# Patient Record
Sex: FEMALE | Race: BLACK OR AFRICAN AMERICAN | NOT HISPANIC OR LATINO | Employment: FULL TIME | RURAL
[De-identification: names, ages, dates, MRNs, and addresses within clinical notes are randomized per-mention and may not be internally consistent; named-entity substitution may affect disease eponyms.]

---

## 2017-12-06 ENCOUNTER — HISTORICAL (OUTPATIENT)
Dept: ADMINISTRATIVE | Facility: HOSPITAL | Age: 22
End: 2017-12-06

## 2017-12-07 LAB
LAB AP CLINICAL INFORMATION: NORMAL
LAB AP DIAGNOSIS - HISTORICAL: NORMAL
LAB AP GROSS PATHOLOGY - HISTORICAL: NORMAL
LAB AP SPECIMEN SUBMITTED - HISTORICAL: NORMAL

## 2018-09-24 ENCOUNTER — HISTORICAL (OUTPATIENT)
Dept: ADMINISTRATIVE | Facility: HOSPITAL | Age: 23
End: 2018-09-24

## 2019-11-13 ENCOUNTER — HISTORICAL (OUTPATIENT)
Dept: ADMINISTRATIVE | Facility: HOSPITAL | Age: 24
End: 2019-11-13

## 2019-11-15 LAB
LAB AP CLINICAL INFORMATION: NORMAL
LAB AP GENERAL CAT - HISTORICAL: NORMAL
LAB AP INTERPRETATION/RESULT - HISTORICAL: NEGATIVE
LAB AP SPECIMEN ADEQUACY - HISTORICAL: NORMAL
LAB AP SPECIMEN SUBMITTED - HISTORICAL: NORMAL

## 2020-04-01 ENCOUNTER — HISTORICAL (OUTPATIENT)
Dept: ADMINISTRATIVE | Facility: HOSPITAL | Age: 25
End: 2020-04-01

## 2020-04-01 LAB
BASOPHILS # BLD AUTO: 0.04 X10E3/UL (ref 0–0.2)
BASOPHILS NFR BLD AUTO: 0.6 % (ref 0–1)
CANDIDA SPECIES: NEGATIVE
EOSINOPHIL # BLD AUTO: 0.07 X10E3/UL (ref 0–0.5)
EOSINOPHIL NFR BLD AUTO: 1 % (ref 1–4)
ERYTHROCYTE [DISTWIDTH] IN BLOOD BY AUTOMATED COUNT: 12 % (ref 11.5–14.5)
GARDNERELLA: NEGATIVE
HCT VFR BLD AUTO: 38.2 % (ref 38–47)
HGB BLD-MCNC: 12.4 G/DL (ref 12–16)
IMM GRANULOCYTES # BLD AUTO: 0 X10E3/UL (ref 0–0.04)
IMM GRANULOCYTES NFR BLD: 0 % (ref 0–0.4)
LYMPHOCYTES # BLD AUTO: 1.8 X10E3/UL (ref 1–4.8)
LYMPHOCYTES NFR BLD AUTO: 26.6 % (ref 27–41)
MCH RBC QN AUTO: 27.8 PG (ref 27–31)
MCHC RBC AUTO-ENTMCNC: 32.5 G/DL (ref 32–36)
MCV RBC AUTO: 85.7 FL (ref 80–96)
MONOCYTES # BLD AUTO: 0.65 X10E3/UL (ref 0–0.8)
MONOCYTES NFR BLD AUTO: 9.6 % (ref 2–6)
MPC BLD CALC-MCNC: 10.4 FL (ref 9.4–12.4)
NEUTROPHILS # BLD AUTO: 4.2 X10E3/UL (ref 1.8–7.7)
NEUTROPHILS NFR BLD AUTO: 62.2 % (ref 53–65)
NRBC # BLD AUTO: 0 X10E3/UL (ref 0–0)
NRBC, AUTO (.00): 0 /100 (ref 0–0)
PLATELET # BLD AUTO: 405 X10E3/UL (ref 150–400)
RBC # BLD AUTO: 4.46 X10E6/UL (ref 4.2–5.4)
TRICHOMONAS: NEGATIVE
WBC # BLD AUTO: 6.76 X10E3/UL (ref 4.5–11)

## 2020-04-02 LAB
BACTERIA #/AREA URNS HPF: ABNORMAL /HPF
BILIRUB UR QL STRIP: NEGATIVE MG/DL
CLARITY UR: CLEAR
COLOR UR: YELLOW
GLUCOSE UR STRIP-MCNC: NEGATIVE MG/DL
KETONES UR STRIP-SCNC: NEGATIVE MG/DL
LEUKOCYTE ESTERASE UR QL STRIP: NEGATIVE LEU/UL
NITRITE UR QL STRIP: NEGATIVE
PH UR STRIP: 7.5 PH UNITS (ref 5–8)
PROT UR QL STRIP: NEGATIVE MG/DL
RBC # UR STRIP: NEGATIVE ERY/UL
RBC #/AREA URNS HPF: ABNORMAL /HPF (ref 0–3)
SP GR UR STRIP: 1.01 (ref 1–1.03)
SQUAMOUS #/AREA URNS LPF: ABNORMAL /LPF
UROBILINOGEN UR STRIP-ACNC: 0.2 EU/DL
WBC #/AREA URNS HPF: ABNORMAL /HPF (ref 0–5)

## 2020-04-03 LAB
REPORT: NO GROWTH
REPORT: NORMAL

## 2020-04-29 ENCOUNTER — HISTORICAL (OUTPATIENT)
Dept: ADMINISTRATIVE | Facility: HOSPITAL | Age: 25
End: 2020-04-29

## 2020-05-21 ENCOUNTER — HISTORICAL (OUTPATIENT)
Dept: ADMINISTRATIVE | Facility: HOSPITAL | Age: 25
End: 2020-05-21

## 2020-05-23 LAB
REPORT: NO GROWTH
REPORT: NORMAL

## 2020-06-02 ENCOUNTER — HISTORICAL (OUTPATIENT)
Dept: ADMINISTRATIVE | Facility: HOSPITAL | Age: 25
End: 2020-06-02

## 2020-06-15 ENCOUNTER — HISTORICAL (OUTPATIENT)
Dept: ADMINISTRATIVE | Facility: HOSPITAL | Age: 25
End: 2020-06-15

## 2020-07-22 ENCOUNTER — HISTORICAL (OUTPATIENT)
Dept: ADMINISTRATIVE | Facility: HOSPITAL | Age: 25
End: 2020-07-22

## 2020-07-22 LAB
BACTERIA #/AREA URNS HPF: ABNORMAL /HPF
BASOPHILS # BLD AUTO: 0.04 X10E3/UL (ref 0–0.2)
BASOPHILS NFR BLD AUTO: 0.4 % (ref 0–1)
BILIRUB UR QL STRIP: NEGATIVE MG/DL
CLARITY UR: CLEAR
COLOR UR: YELLOW
EOSINOPHIL # BLD AUTO: 0.07 X10E3/UL (ref 0–0.5)
EOSINOPHIL NFR BLD AUTO: 0.8 % (ref 1–4)
ERYTHROCYTE [DISTWIDTH] IN BLOOD BY AUTOMATED COUNT: 12.1 % (ref 11.5–14.5)
GLUCOSE UR STRIP-MCNC: NEGATIVE MG/DL
HCT VFR BLD AUTO: 38.9 % (ref 38–47)
HGB BLD-MCNC: 12.6 G/DL (ref 12–16)
IMM GRANULOCYTES # BLD AUTO: 0.02 X10E3/UL (ref 0–0.04)
IMM GRANULOCYTES NFR BLD: 0.2 % (ref 0–0.4)
KETONES UR STRIP-SCNC: NEGATIVE MG/DL
LEUKOCYTE ESTERASE UR QL STRIP: NEGATIVE LEU/UL
LYMPHOCYTES # BLD AUTO: 2.7 X10E3/UL (ref 1–4.8)
LYMPHOCYTES NFR BLD AUTO: 30.1 % (ref 27–41)
MCH RBC QN AUTO: 27.8 PG (ref 27–31)
MCHC RBC AUTO-ENTMCNC: 32.4 G/DL (ref 32–36)
MCV RBC AUTO: 85.7 FL (ref 80–96)
MONOCYTES # BLD AUTO: 0.57 X10E3/UL (ref 0–0.8)
MONOCYTES NFR BLD AUTO: 6.3 % (ref 2–6)
MPC BLD CALC-MCNC: 9.9 FL (ref 9.4–12.4)
NEUTROPHILS # BLD AUTO: 5.58 X10E3/UL (ref 1.8–7.7)
NEUTROPHILS NFR BLD AUTO: 62.2 % (ref 53–65)
NITRITE UR QL STRIP: NEGATIVE
NRBC # BLD AUTO: 0 X10E3/UL (ref 0–0)
NRBC, AUTO (.00): 0 /100 (ref 0–0)
PH UR STRIP: 7 PH UNITS (ref 5–8)
PLATELET # BLD AUTO: 460 X10E3/UL (ref 150–400)
PROT UR QL STRIP: NEGATIVE MG/DL
RBC # BLD AUTO: 4.54 X10E6/UL (ref 4.2–5.4)
RBC # UR STRIP: NEGATIVE ERY/UL
RBC #/AREA URNS HPF: ABNORMAL /HPF (ref 0–3)
SP GR UR STRIP: <=1.005 (ref 1–1.03)
SQUAMOUS #/AREA URNS LPF: ABNORMAL /LPF
UROBILINOGEN UR STRIP-ACNC: 0.2 EU/DL
WBC # BLD AUTO: 8.98 X10E3/UL (ref 4.5–11)
WBC #/AREA URNS HPF: ABNORMAL /HPF (ref 0–5)

## 2020-07-24 ENCOUNTER — HISTORICAL (OUTPATIENT)
Dept: ADMINISTRATIVE | Facility: HOSPITAL | Age: 25
End: 2020-07-24

## 2020-07-24 LAB
ALBUMIN SERPL BCP-MCNC: 3.5 G/DL (ref 3.5–5)
ALBUMIN/GLOB SERPL: 0.9 {RATIO}
ALP SERPL-CCNC: 81 U/L (ref 52–144)
ALT SERPL W P-5'-P-CCNC: 23 U/L (ref 13–56)
AMPHET UR QL SCN: NEGATIVE NG/ML
AMYLASE SERPL-CCNC: 51 U/L (ref 25–115)
ANION GAP SERPL CALCULATED.3IONS-SCNC: 8 MMOL/L
AST SERPL W P-5'-P-CCNC: 18 U/L (ref 15–37)
BARBITURATES UR QL SCN: POSITIVE NG/ML
BASOPHILS # BLD AUTO: 0.03 X10E3/UL (ref 0–0.2)
BASOPHILS NFR BLD AUTO: 0.5 % (ref 0–1)
BENZODIAZ METAB UR QL SCN: NEGATIVE NG/ML
BILIRUB SERPL-MCNC: 0.3 MG/DL (ref 0–1.2)
BILIRUB UR QL STRIP: NEGATIVE MG/DL
BUN SERPL-MCNC: 8 MG/DL (ref 7–18)
BUN/CREAT SERPL: 13.1
CALCIUM SERPL-MCNC: 8.5 MG/DL (ref 8.5–10.1)
CANNABINOIDS UR QL SCN: NEGATIVE NG/ML
CHLORIDE SERPL-SCNC: 106 MMOL/L (ref 98–107)
CLARITY UR: CLEAR
CO2 SERPL-SCNC: 27 MMOL/L (ref 21–32)
COCAINE UR QL SCN: NEGATIVE NG/ML
COLOR UR: ABNORMAL
CREAT SERPL-MCNC: 0.61 MG/DL (ref 0.55–1.02)
EOSINOPHIL # BLD AUTO: 0.07 X10E3/UL (ref 0–0.5)
EOSINOPHIL NFR BLD AUTO: 1.1 % (ref 1–4)
ERYTHROCYTE [DISTWIDTH] IN BLOOD BY AUTOMATED COUNT: 11.9 % (ref 11.5–14.5)
GLOBULIN SER-MCNC: 3.7 G/DL (ref 2–4)
GLUCOSE SERPL-MCNC: 97 MG/DL (ref 74–106)
GLUCOSE UR STRIP-MCNC: NEGATIVE MG/DL
HCT VFR BLD AUTO: 37.3 % (ref 38–47)
HGB BLD-MCNC: 12.2 G/DL (ref 12–16)
IMM GRANULOCYTES # BLD AUTO: 0.01 X10E3/UL (ref 0–0.04)
IMM GRANULOCYTES NFR BLD: 0.2 % (ref 0–0.4)
KETONES UR STRIP-SCNC: NEGATIVE MG/DL
LEUKOCYTE ESTERASE UR QL STRIP: NEGATIVE LEU/UL
LIPASE SERPL-CCNC: 59 U/L (ref 73–393)
LYMPHOCYTES # BLD AUTO: 1.85 X10E3/UL (ref 1–4.8)
LYMPHOCYTES NFR BLD AUTO: 29.5 % (ref 27–41)
MCH RBC QN AUTO: 28.7 PG (ref 27–31)
MCHC RBC AUTO-ENTMCNC: 32.7 G/DL (ref 32–36)
MCV RBC AUTO: 87.8 FL (ref 80–96)
MONOCYTES # BLD AUTO: 0.46 X10E3/UL (ref 0–0.8)
MONOCYTES NFR BLD AUTO: 7.3 % (ref 2–6)
MPC BLD CALC-MCNC: 9.3 FL (ref 9.4–12.4)
NEUTROPHILS # BLD AUTO: 3.86 X10E3/UL (ref 1.8–7.7)
NEUTROPHILS NFR BLD AUTO: 61.4 % (ref 53–65)
NITRITE UR QL STRIP: NEGATIVE
NRBC # BLD AUTO: 0 X10E3/UL (ref 0–0)
NRBC, AUTO (.00): 0 /100 (ref 0–0)
OPIATES UR QL SCN: NEGATIVE NG/ML
PCP UR QL SCN: NEGATIVE NG/ML
PH UR STRIP: 7.5 PH UNITS (ref 5–8)
PLATELET # BLD AUTO: 391 X10E3/UL (ref 150–400)
POTASSIUM SERPL-SCNC: 4.1 MMOL/L (ref 3.5–5.1)
PROT SERPL-MCNC: 7.2 G/DL (ref 6.4–8.2)
PROT UR QL STRIP: NEGATIVE MG/DL
RBC # BLD AUTO: 4.25 X10E6/UL (ref 4.2–5.4)
RBC # UR STRIP: NEGATIVE ERY/UL
SODIUM SERPL-SCNC: 137 MMOL/L (ref 136–145)
SP GR UR STRIP: 1.01 (ref 1–1.03)
UROBILINOGEN UR STRIP-ACNC: 0.2 EU/DL
WBC # BLD AUTO: 6.28 X10E3/UL (ref 4.5–11)

## 2020-08-10 ENCOUNTER — HISTORICAL (OUTPATIENT)
Dept: ADMINISTRATIVE | Facility: HOSPITAL | Age: 25
End: 2020-08-10

## 2020-08-19 ENCOUNTER — HISTORICAL (OUTPATIENT)
Dept: ADMINISTRATIVE | Facility: HOSPITAL | Age: 25
End: 2020-08-19

## 2020-08-19 LAB
BACTERIA #/AREA URNS HPF: ABNORMAL /HPF
BILIRUB UR QL STRIP: NEGATIVE MG/DL
CLARITY UR: CLEAR
COLOR UR: YELLOW
GLUCOSE UR STRIP-MCNC: NEGATIVE MG/DL
KETONES UR STRIP-SCNC: NEGATIVE MG/DL
LEUKOCYTE ESTERASE UR QL STRIP: NEGATIVE LEU/UL
NITRITE UR QL STRIP: NEGATIVE
PH UR STRIP: 8 PH UNITS (ref 5–8)
PROT UR QL STRIP: NEGATIVE MG/DL
RBC # UR STRIP: NEGATIVE ERY/UL
RBC #/AREA URNS HPF: ABNORMAL /HPF (ref 0–3)
SP GR UR STRIP: 1.01 (ref 1–1.03)
SQUAMOUS #/AREA URNS LPF: ABNORMAL /LPF
UROBILINOGEN UR STRIP-ACNC: 0.2 EU/DL
WBC #/AREA URNS HPF: ABNORMAL /HPF (ref 0–5)

## 2020-08-21 LAB
REPORT: NORMAL

## 2020-09-12 ENCOUNTER — HISTORICAL (OUTPATIENT)
Dept: ADMINISTRATIVE | Facility: HOSPITAL | Age: 25
End: 2020-09-12

## 2020-09-12 LAB
BILIRUB UR QL STRIP: NEGATIVE MG/DL
CLARITY UR: CLEAR
COLOR UR: ABNORMAL
GLUCOSE UR STRIP-MCNC: NEGATIVE MG/DL
KETONES UR STRIP-SCNC: NEGATIVE MG/DL
LEUKOCYTE ESTERASE UR QL STRIP: NEGATIVE LEU/UL
NITRITE UR QL STRIP: NEGATIVE
PH UR STRIP: 6 PH UNITS (ref 5–8)
PROT UR QL STRIP: NEGATIVE MG/DL
RBC # UR STRIP: ABNORMAL ERY/UL
SP GR UR STRIP: 1.01 (ref 1–1.03)
UROBILINOGEN UR STRIP-ACNC: 0.2 EU/DL

## 2020-09-30 ENCOUNTER — HISTORICAL (OUTPATIENT)
Dept: ADMINISTRATIVE | Facility: HOSPITAL | Age: 25
End: 2020-09-30

## 2020-09-30 LAB
ESTRADIOL SERPL-MCNC: 26.2 PG/ML
T4 FREE SERPL-MCNC: 1.04 NG/DL (ref 0.76–1.46)
TSH SERPL DL<=0.005 MIU/L-ACNC: 0.89 UIU/ML (ref 0.36–3.74)

## 2020-10-05 ENCOUNTER — HISTORICAL (OUTPATIENT)
Dept: ADMINISTRATIVE | Facility: HOSPITAL | Age: 25
End: 2020-10-05

## 2020-10-05 LAB
BASOPHILS # BLD AUTO: 0.04 X10E3/UL (ref 0–0.2)
BASOPHILS NFR BLD AUTO: 0.3 % (ref 0–1)
EOSINOPHIL # BLD AUTO: 0.02 X10E3/UL (ref 0–0.5)
EOSINOPHIL NFR BLD AUTO: 0.2 % (ref 1–4)
ERYTHROCYTE [DISTWIDTH] IN BLOOD BY AUTOMATED COUNT: 11.8 % (ref 11.5–14.5)
HCT VFR BLD AUTO: 40.7 % (ref 38–47)
HGB BLD-MCNC: 13.2 G/DL (ref 12–16)
IMM GRANULOCYTES # BLD AUTO: 0.05 X10E3/UL (ref 0–0.04)
IMM GRANULOCYTES NFR BLD: 0.4 % (ref 0–0.4)
LYMPHOCYTES # BLD AUTO: 2.99 X10E3/UL (ref 1–4.8)
LYMPHOCYTES NFR BLD AUTO: 23.2 % (ref 27–41)
MCH RBC QN AUTO: 28 PG (ref 27–31)
MCHC RBC AUTO-ENTMCNC: 32.4 G/DL (ref 32–36)
MCV RBC AUTO: 86.2 FL (ref 80–96)
MONOCYTES # BLD AUTO: 0.72 X10E3/UL (ref 0–0.8)
MONOCYTES NFR BLD AUTO: 5.6 % (ref 2–6)
MPC BLD CALC-MCNC: 9.9 FL (ref 9.4–12.4)
NEUTROPHILS # BLD AUTO: 9.09 X10E3/UL (ref 1.8–7.7)
NEUTROPHILS NFR BLD AUTO: 70.3 % (ref 53–65)
NRBC # BLD AUTO: 0 X10E3/UL (ref 0–0)
NRBC, AUTO (.00): 0 /100 (ref 0–0)
PLATELET # BLD AUTO: 471 X10E3/UL (ref 150–400)
RBC # BLD AUTO: 4.72 X10E6/UL (ref 4.2–5.4)
SARS-COV+SARS-COV-2 AG RESP QL IA.RAPID: NEGATIVE
WBC # BLD AUTO: 12.91 X10E3/UL (ref 4.5–11)

## 2020-11-17 ENCOUNTER — HISTORICAL (OUTPATIENT)
Dept: ADMINISTRATIVE | Facility: HOSPITAL | Age: 25
End: 2020-11-17

## 2020-11-17 LAB
25(OH)D3 SERPL-MCNC: 12.3 NG/ML
BASOPHILS # BLD AUTO: 0.04 X10E3/UL (ref 0–0.2)
BASOPHILS NFR BLD AUTO: 0.5 % (ref 0–1)
CHOLEST SERPL-MCNC: 142 MG/DL
EOSINOPHIL # BLD AUTO: 0.04 X10E3/UL (ref 0–0.5)
EOSINOPHIL NFR BLD AUTO: 0.5 % (ref 1–4)
ERYTHROCYTE [DISTWIDTH] IN BLOOD BY AUTOMATED COUNT: 12.4 % (ref 11.5–14.5)
HCT VFR BLD AUTO: 34.8 % (ref 38–47)
HGB BLD-MCNC: 11.1 G/DL (ref 12–16)
IMM GRANULOCYTES # BLD AUTO: 0.01 X10E3/UL (ref 0–0.04)
IMM GRANULOCYTES NFR BLD: 0.1 % (ref 0–0.4)
LYMPHOCYTES # BLD AUTO: 1.94 X10E3/UL (ref 1–4.8)
LYMPHOCYTES NFR BLD AUTO: 24.5 % (ref 27–41)
MCH RBC QN AUTO: 27.8 PG (ref 27–31)
MCHC RBC AUTO-ENTMCNC: 31.9 G/DL (ref 32–36)
MCV RBC AUTO: 87 FL (ref 80–96)
MONOCYTES # BLD AUTO: 0.56 X10E3/UL (ref 0–0.8)
MONOCYTES NFR BLD AUTO: 7.1 % (ref 2–6)
MPC BLD CALC-MCNC: 9.8 FL (ref 9.4–12.4)
NEUTROPHILS # BLD AUTO: 5.33 X10E3/UL (ref 1.8–7.7)
NEUTROPHILS NFR BLD AUTO: 67.3 % (ref 53–65)
NRBC # BLD AUTO: 0 X10E3/UL (ref 0–0)
NRBC, AUTO (.00): 0 /100 (ref 0–0)
PLATELET # BLD AUTO: 470 X10E3/UL (ref 150–400)
RBC # BLD AUTO: 4 X10E6/UL (ref 4.2–5.4)
WBC # BLD AUTO: 7.92 X10E3/UL (ref 4.5–11)

## 2021-01-05 ENCOUNTER — HISTORICAL (OUTPATIENT)
Dept: ADMINISTRATIVE | Facility: HOSPITAL | Age: 26
End: 2021-01-05

## 2021-03-09 ENCOUNTER — HISTORICAL (OUTPATIENT)
Dept: ADMINISTRATIVE | Facility: HOSPITAL | Age: 26
End: 2021-03-09

## 2021-03-09 LAB
BACTERIA #/AREA URNS HPF: ABNORMAL /HPF
BASOPHILS # BLD AUTO: 0.06 X10E3/UL (ref 0–0.2)
BASOPHILS NFR BLD AUTO: 0.6 % (ref 0–1)
BILIRUB UR QL STRIP: NEGATIVE MG/DL
CLARITY UR: CLEAR
COLOR UR: YELLOW
EOSINOPHIL # BLD AUTO: 0.09 X10E3/UL (ref 0–0.5)
EOSINOPHIL NFR BLD AUTO: 0.9 % (ref 1–4)
ERYTHROCYTE [DISTWIDTH] IN BLOOD BY AUTOMATED COUNT: 12.3 % (ref 11.5–14.5)
ERYTHROCYTE [SEDIMENTATION RATE] IN BLOOD BY WESTERGREN METHOD: 24 MM/HR (ref 0–20)
GLUCOSE UR STRIP-MCNC: NEGATIVE MG/DL
HCT VFR BLD AUTO: 38.1 % (ref 38–47)
HGB BLD-MCNC: 12.2 G/DL (ref 12–16)
IMM GRANULOCYTES # BLD AUTO: 0.02 X10E3/UL (ref 0–0.04)
IMM GRANULOCYTES NFR BLD: 0.2 % (ref 0–0.4)
KETONES UR STRIP-SCNC: NEGATIVE MG/DL
LEUKOCYTE ESTERASE UR QL STRIP: NEGATIVE LEU/UL
LYMPHOCYTES # BLD AUTO: 2.39 X10E3/UL (ref 1–4.8)
LYMPHOCYTES NFR BLD AUTO: 24.7 % (ref 27–41)
MCH RBC QN AUTO: 26.8 PG (ref 27–31)
MCHC RBC AUTO-ENTMCNC: 32 G/DL (ref 32–36)
MCV RBC AUTO: 83.6 FL (ref 80–96)
MONOCYTES # BLD AUTO: 0.64 X10E3/UL (ref 0–0.8)
MONOCYTES NFR BLD AUTO: 6.6 % (ref 2–6)
MPC BLD CALC-MCNC: 9.9 FL (ref 9.4–12.4)
NEUTROPHILS # BLD AUTO: 6.49 X10E3/UL (ref 1.8–7.7)
NEUTROPHILS NFR BLD AUTO: 67 % (ref 53–65)
NITRITE UR QL STRIP: NEGATIVE
NRBC # BLD AUTO: 0 X10E3/UL (ref 0–0)
NRBC, AUTO (.00): 0 /100 (ref 0–0)
PH UR STRIP: 7 PH UNITS (ref 5–8)
PLATELET # BLD AUTO: 463 X10E3/UL (ref 150–400)
PROT UR QL STRIP: NEGATIVE MG/DL
RBC # BLD AUTO: 4.56 X10E6/UL (ref 4.2–5.4)
RBC # UR STRIP: NEGATIVE ERY/UL
RBC #/AREA URNS HPF: ABNORMAL /HPF (ref 0–3)
SP GR UR STRIP: 1.01 (ref 1–1.03)
SQUAMOUS #/AREA URNS LPF: ABNORMAL /LPF
UROBILINOGEN UR STRIP-ACNC: 0.2 EU/DL
WBC # BLD AUTO: 9.69 X10E3/UL (ref 4.5–11)
WBC #/AREA URNS HPF: ABNORMAL /HPF (ref 0–5)

## 2021-03-10 LAB
REPORT: NORMAL

## 2021-04-21 RX ORDER — PHENAZOPYRIDINE HYDROCHLORIDE 200 MG/1
200 TABLET, FILM COATED ORAL
COMMUNITY
Start: 2018-09-20 | End: 2021-12-15

## 2021-04-21 RX ORDER — LANSOPRAZOLE 30 MG/1
30 CAPSULE, DELAYED RELEASE ORAL DAILY
COMMUNITY
Start: 2020-11-17 | End: 2021-12-15

## 2021-04-21 RX ORDER — TRAZODONE HYDROCHLORIDE 50 MG/1
50 TABLET ORAL NIGHTLY
COMMUNITY
End: 2021-12-15

## 2021-04-21 RX ORDER — FLUCONAZOLE 100 MG/1
100 TABLET ORAL DAILY
COMMUNITY
Start: 2020-11-10 | End: 2021-10-25

## 2021-04-21 RX ORDER — BUTALBITAL, ACETAMINOPHEN AND CAFFEINE 50; 325; 40 MG/1; MG/1; MG/1
CAPSULE ORAL EVERY 4 HOURS PRN
COMMUNITY
Start: 2021-03-02 | End: 2021-12-15

## 2021-04-21 RX ORDER — NITROFURANTOIN 25; 75 MG/1; MG/1
100 CAPSULE ORAL
COMMUNITY
Start: 2020-05-21 | End: 2021-10-25

## 2021-04-21 RX ORDER — FLAVOXATE HYDROCHLORIDE 100 MG/1
100 TABLET ORAL EVERY 6 HOURS
COMMUNITY
Start: 2020-05-21 | End: 2021-12-15

## 2021-04-21 RX ORDER — ONDANSETRON 4 MG/1
8 TABLET, FILM COATED ORAL EVERY 8 HOURS PRN
COMMUNITY
Start: 2020-03-12 | End: 2021-12-15

## 2021-04-21 RX ORDER — SYRING-NEEDL,DISP,INSUL,0.3 ML 29 G X1/2"
180 SYRINGE, EMPTY DISPOSABLE MISCELLANEOUS ONCE
COMMUNITY
Start: 2018-09-20 | End: 2021-10-25

## 2021-04-21 RX ORDER — CELECOXIB 200 MG/1
200 CAPSULE ORAL 2 TIMES DAILY
COMMUNITY
Start: 2020-08-19 | End: 2021-12-15

## 2021-04-21 RX ORDER — ERGOCALCIFEROL 1.25 MG/1
50000 CAPSULE ORAL
COMMUNITY
Start: 2021-03-09 | End: 2022-09-28 | Stop reason: SDUPTHER

## 2021-04-21 RX ORDER — ESTRADIOL 2 MG/1
2 TABLET ORAL DAILY
COMMUNITY
Start: 2021-03-09 | End: 2022-09-28 | Stop reason: SDUPTHER

## 2021-04-21 RX ORDER — DICLOFENAC POTASSIUM 50 MG/1
50 TABLET, FILM COATED ORAL 3 TIMES DAILY
COMMUNITY
Start: 2020-07-22 | End: 2021-12-15

## 2021-04-21 RX ORDER — METRONIDAZOLE 500 MG/1
500 TABLET ORAL 2 TIMES DAILY
COMMUNITY
Start: 2019-11-14 | End: 2021-10-25

## 2021-04-21 RX ORDER — TOLTERODINE 4 MG/1
4 CAPSULE, EXTENDED RELEASE ORAL DAILY
COMMUNITY
Start: 2021-03-09 | End: 2021-12-15

## 2021-04-21 RX ORDER — ESTRADIOL 0.1 MG/D
1 FILM, EXTENDED RELEASE TRANSDERMAL
COMMUNITY
Start: 2020-07-22 | End: 2021-12-15

## 2021-10-25 ENCOUNTER — OFFICE VISIT (OUTPATIENT)
Dept: FAMILY MEDICINE | Facility: CLINIC | Age: 26
End: 2021-10-25
Payer: COMMERCIAL

## 2021-10-25 VITALS — TEMPERATURE: 98 F | WEIGHT: 223.81 LBS | BODY MASS INDEX: 33.92 KG/M2 | HEIGHT: 68 IN

## 2021-10-25 DIAGNOSIS — Z00.01 ENCOUNTER FOR GENERAL ADULT MEDICAL EXAMINATION WITH ABNORMAL FINDINGS: Primary | ICD-10-CM

## 2021-10-25 DIAGNOSIS — J30.9 ALLERGIC RHINITIS, UNSPECIFIED SEASONALITY, UNSPECIFIED TRIGGER: ICD-10-CM

## 2021-10-25 PROCEDURE — 1159F MED LIST DOCD IN RCRD: CPT | Mod: CPTII,,, | Performed by: NURSE PRACTITIONER

## 2021-10-25 PROCEDURE — 3008F BODY MASS INDEX DOCD: CPT | Mod: CPTII,,, | Performed by: NURSE PRACTITIONER

## 2021-10-25 PROCEDURE — 3008F PR BODY MASS INDEX (BMI) DOCUMENTED: ICD-10-PCS | Mod: CPTII,,, | Performed by: NURSE PRACTITIONER

## 2021-10-25 PROCEDURE — 99395 PR PREVENTIVE VISIT,EST,18-39: ICD-10-PCS | Mod: ,,, | Performed by: NURSE PRACTITIONER

## 2021-10-25 PROCEDURE — 99395 PREV VISIT EST AGE 18-39: CPT | Mod: ,,, | Performed by: NURSE PRACTITIONER

## 2021-10-25 PROCEDURE — 1159F PR MEDICATION LIST DOCUMENTED IN MEDICAL RECORD: ICD-10-PCS | Mod: CPTII,,, | Performed by: NURSE PRACTITIONER

## 2021-10-25 PROCEDURE — 1160F RVW MEDS BY RX/DR IN RCRD: CPT | Mod: CPTII,,, | Performed by: NURSE PRACTITIONER

## 2021-10-25 PROCEDURE — 1160F PR REVIEW ALL MEDS BY PRESCRIBER/CLIN PHARMACIST DOCUMENTED: ICD-10-PCS | Mod: CPTII,,, | Performed by: NURSE PRACTITIONER

## 2021-11-03 ENCOUNTER — OFFICE VISIT (OUTPATIENT)
Dept: FAMILY MEDICINE | Facility: CLINIC | Age: 26
End: 2021-11-03
Payer: COMMERCIAL

## 2021-11-03 VITALS
SYSTOLIC BLOOD PRESSURE: 110 MMHG | RESPIRATION RATE: 18 BRPM | OXYGEN SATURATION: 99 % | DIASTOLIC BLOOD PRESSURE: 78 MMHG | TEMPERATURE: 97 F | HEIGHT: 68 IN | BODY MASS INDEX: 34.25 KG/M2 | HEART RATE: 85 BPM | WEIGHT: 226 LBS

## 2021-11-03 DIAGNOSIS — J06.9 UPPER RESPIRATORY TRACT INFECTION, UNSPECIFIED TYPE: Primary | ICD-10-CM
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
## 2021-11-03 PROCEDURE — 3008F PR BODY MASS INDEX (BMI) DOCUMENTED: ICD-10-PCS | Mod: CPTII,,, | Performed by: NURSE PRACTITIONER

## 2021-11-03 PROCEDURE — 86757 RICKETTSIA ANTIBODY: CPT | Mod: 90,,, | Performed by: CLINICAL MEDICAL LABORATORY

## 2021-11-03 PROCEDURE — 3074F PR MOST RECENT SYSTOLIC BLOOD PRESSURE < 130 MM HG: ICD-10-PCS | Mod: CPTII,,, | Performed by: NURSE PRACTITIONER

## 2021-11-03 PROCEDURE — 96372 THER/PROPH/DIAG INJ SC/IM: CPT | Mod: ,,, | Performed by: NURSE PRACTITIONER

## 2021-11-03 PROCEDURE — 86003 ALLG SPEC IGE CRUDE XTRC EA: CPT | Mod: 90,,, | Performed by: CLINICAL MEDICAL LABORATORY

## 2021-11-03 PROCEDURE — 1160F PR REVIEW ALL MEDS BY PRESCRIBER/CLIN PHARMACIST DOCUMENTED: ICD-10-PCS | Mod: CPTII,,, | Performed by: NURSE PRACTITIONER

## 2021-11-03 PROCEDURE — 1160F RVW MEDS BY RX/DR IN RCRD: CPT | Mod: CPTII,,, | Performed by: NURSE PRACTITIONER

## 2021-11-03 PROCEDURE — 3074F SYST BP LT 130 MM HG: CPT | Mod: CPTII,,, | Performed by: NURSE PRACTITIONER

## 2021-11-03 PROCEDURE — 86003 PR  ALLERGEN SPEC IGE QUANT,EACH: ICD-10-PCS | Mod: 90,,, | Performed by: CLINICAL MEDICAL LABORATORY

## 2021-11-03 PROCEDURE — 96372 PR INJECTION,THERAP/PROPH/DIAG2ST, IM OR SUBCUT: ICD-10-PCS | Mod: ,,, | Performed by: NURSE PRACTITIONER

## 2021-11-03 PROCEDURE — 3078F DIAST BP <80 MM HG: CPT | Mod: CPTII,,, | Performed by: NURSE PRACTITIONER

## 2021-11-03 PROCEDURE — 86757 MAYO GENERIC ORDERABLE: ICD-10-PCS | Mod: 90,,, | Performed by: CLINICAL MEDICAL LABORATORY

## 2021-11-03 PROCEDURE — 99213 OFFICE O/P EST LOW 20 MIN: CPT | Mod: 25,,, | Performed by: NURSE PRACTITIONER

## 2021-11-03 PROCEDURE — 1159F MED LIST DOCD IN RCRD: CPT | Mod: CPTII,,, | Performed by: NURSE PRACTITIONER

## 2021-11-03 PROCEDURE — 3078F PR MOST RECENT DIASTOLIC BLOOD PRESSURE < 80 MM HG: ICD-10-PCS | Mod: CPTII,,, | Performed by: NURSE PRACTITIONER

## 2021-11-03 PROCEDURE — 3008F BODY MASS INDEX DOCD: CPT | Mod: CPTII,,, | Performed by: NURSE PRACTITIONER

## 2021-11-03 PROCEDURE — 1159F PR MEDICATION LIST DOCUMENTED IN MEDICAL RECORD: ICD-10-PCS | Mod: CPTII,,, | Performed by: NURSE PRACTITIONER

## 2021-11-03 PROCEDURE — 99213 PR OFFICE/OUTPT VISIT, EST, LEVL III, 20-29 MIN: ICD-10-PCS | Mod: 25,,, | Performed by: NURSE PRACTITIONER

## 2021-11-03 RX ORDER — DEXAMETHASONE SODIUM PHOSPHATE 4 MG/ML
4 INJECTION, SOLUTION INTRA-ARTICULAR; INTRALESIONAL; INTRAMUSCULAR; INTRAVENOUS; SOFT TISSUE
Status: COMPLETED | OUTPATIENT
Start: 2021-11-03 | End: 2021-11-03

## 2021-11-03 RX ORDER — DEXAMETHASONE SODIUM PHOSPHATE 4 MG/ML
4 INJECTION, SOLUTION INTRA-ARTICULAR; INTRALESIONAL; INTRAMUSCULAR; INTRAVENOUS; SOFT TISSUE
Status: DISCONTINUED | OUTPATIENT
Start: 2021-11-03 | End: 2021-11-03

## 2021-11-03 RX ORDER — AZITHROMYCIN 250 MG/1
TABLET, FILM COATED ORAL
Qty: 6 TABLET | Refills: 0 | Status: SHIPPED | OUTPATIENT
Start: 2021-11-03 | End: 2021-12-15

## 2021-11-03 RX ORDER — PREDNISONE 10 MG/1
TABLET ORAL
Qty: 18 TABLET | Refills: 0 | Status: SHIPPED | OUTPATIENT
Start: 2021-11-03 | End: 2021-11-12

## 2021-11-03 RX ORDER — CEFTRIAXONE 1 G/1
1 INJECTION, POWDER, FOR SOLUTION INTRAMUSCULAR; INTRAVENOUS
Status: COMPLETED | OUTPATIENT
Start: 2021-11-03 | End: 2021-11-03

## 2021-11-03 RX ADMIN — CEFTRIAXONE 1 G: 1 INJECTION, POWDER, FOR SOLUTION INTRAMUSCULAR; INTRAVENOUS at 09:11

## 2021-11-03 RX ADMIN — DEXAMETHASONE SODIUM PHOSPHATE 4 MG: 4 INJECTION, SOLUTION INTRA-ARTICULAR; INTRALESIONAL; INTRAMUSCULAR; INTRAVENOUS; SOFT TISSUE at 09:11

## 2021-11-05 LAB
MAYO GENERIC ORDERABLE RESULT: NORMAL
RICK SF IGG TITR SER IF: NORMAL {TITER}
RICK SF IGM TITR SER IF: NORMAL {TITER}

## 2021-11-08 DIAGNOSIS — R11.10 ABDOMINAL PAIN WITH VOMITING: Primary | ICD-10-CM

## 2021-11-08 DIAGNOSIS — R10.9 ABDOMINAL PAIN WITH VOMITING: Primary | ICD-10-CM

## 2021-12-09 RX ORDER — ALBUTEROL SULFATE 90 UG/1
AEROSOL, METERED RESPIRATORY (INHALATION)
Qty: 18 G | Refills: 0 | Status: SHIPPED | OUTPATIENT
Start: 2021-12-09 | End: 2022-06-13

## 2021-12-15 RX ORDER — ALBUTEROL SULFATE 0.83 MG/ML
2.5 SOLUTION RESPIRATORY (INHALATION) EVERY 6 HOURS PRN
Qty: 75 EACH | Refills: 0 | Status: SHIPPED | OUTPATIENT
Start: 2021-12-15 | End: 2022-06-13

## 2022-01-31 ENCOUNTER — OFFICE VISIT (OUTPATIENT)
Dept: GASTROENTEROLOGY | Facility: CLINIC | Age: 27
End: 2022-01-31
Payer: COMMERCIAL

## 2022-01-31 VITALS
HEIGHT: 68 IN | WEIGHT: 225 LBS | DIASTOLIC BLOOD PRESSURE: 73 MMHG | OXYGEN SATURATION: 98 % | BODY MASS INDEX: 34.1 KG/M2 | HEART RATE: 77 BPM | RESPIRATION RATE: 14 BRPM | SYSTOLIC BLOOD PRESSURE: 117 MMHG

## 2022-01-31 DIAGNOSIS — R11.14 BILIOUS VOMITING WITH NAUSEA: ICD-10-CM

## 2022-01-31 DIAGNOSIS — R10.11 RUQ PAIN: Primary | ICD-10-CM

## 2022-01-31 DIAGNOSIS — R10.13 EPIGASTRIC PAIN: ICD-10-CM

## 2022-01-31 DIAGNOSIS — R10.10 UPPER ABDOMINAL PAIN: ICD-10-CM

## 2022-01-31 DIAGNOSIS — R19.7 DIARRHEA, UNSPECIFIED TYPE: ICD-10-CM

## 2022-01-31 PROCEDURE — 3078F PR MOST RECENT DIASTOLIC BLOOD PRESSURE < 80 MM HG: ICD-10-PCS | Mod: ,,, | Performed by: NURSE PRACTITIONER

## 2022-01-31 PROCEDURE — 3074F PR MOST RECENT SYSTOLIC BLOOD PRESSURE < 130 MM HG: ICD-10-PCS | Mod: ,,, | Performed by: NURSE PRACTITIONER

## 2022-01-31 PROCEDURE — 99204 PR OFFICE/OUTPT VISIT, NEW, LEVL IV, 45-59 MIN: ICD-10-PCS | Mod: ,,, | Performed by: NURSE PRACTITIONER

## 2022-01-31 PROCEDURE — 3008F BODY MASS INDEX DOCD: CPT | Mod: ,,, | Performed by: NURSE PRACTITIONER

## 2022-01-31 PROCEDURE — 99204 OFFICE O/P NEW MOD 45 MIN: CPT | Mod: ,,, | Performed by: NURSE PRACTITIONER

## 2022-01-31 PROCEDURE — 3074F SYST BP LT 130 MM HG: CPT | Mod: ,,, | Performed by: NURSE PRACTITIONER

## 2022-01-31 PROCEDURE — 3078F DIAST BP <80 MM HG: CPT | Mod: ,,, | Performed by: NURSE PRACTITIONER

## 2022-01-31 PROCEDURE — 3008F PR BODY MASS INDEX (BMI) DOCUMENTED: ICD-10-PCS | Mod: ,,, | Performed by: NURSE PRACTITIONER

## 2022-01-31 NOTE — PROGRESS NOTES
Pt is a 27 y/o BF here for abd pain, N/V since Oct. 2021. Notices red meat, dairy triggers symptoms. Also has mild intermittent diarrhea. Also notices burning in throat and stomach when she eats.     Past Medical History:   Diagnosis Date    Cardiac murmur     Endometriosis     Migraine headache 11/13/2019    PCOS (polycystic ovarian syndrome)     Periumbilical hernia 08/20/2020    repaired by Dr. Dwyer on 10/07/2020    Retroflexion of uterus 07/31/2018    3rd degree     Review of Systems   Constitutional: Negative for chills and fever.   Respiratory: Negative for shortness of breath.    Cardiovascular: Negative for chest pain.   Gastrointestinal: Positive for abdominal pain, diarrhea, nausea and vomiting. Negative for blood in stool, constipation and melena.   Skin: Negative for rash.   Neurological: Negative for weakness.     Physical Exam  Vitals reviewed. Exam conducted with a chaperone present.   Constitutional:       General: She is not in acute distress.     Appearance: Normal appearance.   HENT:      Head: Normocephalic.   Eyes:      General: No scleral icterus.     Pupils: Pupils are equal, round, and reactive to light.   Cardiovascular:      Rate and Rhythm: Normal rate.   Pulmonary:      Effort: Pulmonary effort is normal.   Abdominal:      General: There is no distension.      Palpations: Abdomen is soft.      Tenderness: There is no abdominal tenderness. There is no guarding or rebound.   Skin:     General: Skin is warm and dry.   Neurological:      General: No focal deficit present.      Mental Status: She is alert and oriented to person, place, and time. Mental status is at baseline.   Psychiatric:         Mood and Affect: Mood normal.         Behavior: Behavior normal.         Thought Content: Thought content normal.         Judgment: Judgment normal.       Plan  -GB u/s  -HIDA scan  -Pepcid OTC PRN as directed for heartburn for now  -RTC 3 months, sooner PRN

## 2022-02-08 ENCOUNTER — TELEPHONE (OUTPATIENT)
Dept: GASTROENTEROLOGY | Facility: CLINIC | Age: 27
End: 2022-02-08
Payer: COMMERCIAL

## 2022-02-08 NOTE — TELEPHONE ENCOUNTER
Pt requested us date be moved to sooner due to having abd pain. abd us was moved up to feb 17 at 0900 but hida had to stay same date due to being booked up. Pt voiced understanding.

## 2022-02-14 DIAGNOSIS — E53.9 VITAMIN B DEFICIENCY: Primary | ICD-10-CM

## 2022-02-14 RX ORDER — CYANOCOBALAMIN, ISOPROPYL ALCOHOL 1000MCG/ML
KIT INJECTION
Qty: 1 KIT | Refills: 2 | Status: SHIPPED | OUTPATIENT
Start: 2022-02-14 | End: 2022-06-13

## 2022-02-17 ENCOUNTER — TELEPHONE (OUTPATIENT)
Dept: GASTROENTEROLOGY | Facility: CLINIC | Age: 27
End: 2022-02-17
Payer: COMMERCIAL

## 2022-02-17 ENCOUNTER — HOSPITAL ENCOUNTER (OUTPATIENT)
Dept: RADIOLOGY | Facility: HOSPITAL | Age: 27
Discharge: HOME | End: 2022-02-17
Attending: NURSE PRACTITIONER
Payer: COMMERCIAL

## 2022-02-17 DIAGNOSIS — R10.11 RUQ PAIN: ICD-10-CM

## 2022-02-17 DIAGNOSIS — R11.14 BILIOUS VOMITING WITH NAUSEA: ICD-10-CM

## 2022-02-17 DIAGNOSIS — R19.7 DIARRHEA, UNSPECIFIED TYPE: ICD-10-CM

## 2022-02-17 PROCEDURE — 76705 ECHO EXAM OF ABDOMEN: CPT | Mod: TC

## 2022-02-17 PROCEDURE — 76705 ECHO EXAM OF ABDOMEN: CPT | Mod: 26,,, | Performed by: RADIOLOGY

## 2022-02-17 PROCEDURE — 76705 US ABDOMEN LIMITED_GALLBLADDER: ICD-10-PCS | Mod: 26,,, | Performed by: RADIOLOGY

## 2022-02-22 ENCOUNTER — HOSPITAL ENCOUNTER (OUTPATIENT)
Dept: RADIOLOGY | Facility: HOSPITAL | Age: 27
Discharge: HOME | End: 2022-02-22
Attending: NURSE PRACTITIONER
Payer: COMMERCIAL

## 2022-02-22 ENCOUNTER — TELEPHONE (OUTPATIENT)
Dept: GASTROENTEROLOGY | Facility: CLINIC | Age: 27
End: 2022-02-22
Payer: COMMERCIAL

## 2022-02-22 DIAGNOSIS — K82.8 DYSKINESIA OF GALLBLADDER: Primary | ICD-10-CM

## 2022-02-22 DIAGNOSIS — R19.7 DIARRHEA, UNSPECIFIED TYPE: ICD-10-CM

## 2022-02-22 DIAGNOSIS — R11.14 BILIOUS VOMITING WITH NAUSEA: ICD-10-CM

## 2022-02-22 DIAGNOSIS — R10.11 RUQ PAIN: ICD-10-CM

## 2022-02-22 PROCEDURE — 78227 HEPATOBIL SYST IMAGE W/DRUG: CPT | Mod: 26,,,

## 2022-02-22 PROCEDURE — 78227 NM HEPATOBILIARY(HIDA) WITH PHARM AND EF: ICD-10-PCS | Mod: 26,,,

## 2022-02-22 PROCEDURE — A9537 TC99M MEBROFENIN: HCPCS

## 2022-02-22 NOTE — TELEPHONE ENCOUNTER
----- Message from JOANA Smith sent at 2/22/2022  1:01 PM CST -----  GB dyskinesia w/ epigastric pain, N/V/D - refer to surg

## 2022-02-23 ENCOUNTER — TELEPHONE (OUTPATIENT)
Dept: GASTROENTEROLOGY | Facility: CLINIC | Age: 27
End: 2022-02-23
Payer: COMMERCIAL

## 2022-02-28 ENCOUNTER — OFFICE VISIT (OUTPATIENT)
Dept: SURGERY | Facility: CLINIC | Age: 27
End: 2022-02-28
Attending: SURGERY
Payer: COMMERCIAL

## 2022-02-28 DIAGNOSIS — Z20.822 ENCOUNTER FOR LABORATORY TESTING FOR COVID-19 VIRUS: ICD-10-CM

## 2022-02-28 DIAGNOSIS — K82.8 DYSKINESIA OF GALLBLADDER: Primary | ICD-10-CM

## 2022-02-28 PROCEDURE — 1159F PR MEDICATION LIST DOCUMENTED IN MEDICAL RECORD: ICD-10-PCS | Mod: ,,, | Performed by: SURGERY

## 2022-02-28 PROCEDURE — 1159F MED LIST DOCD IN RCRD: CPT | Mod: ,,, | Performed by: SURGERY

## 2022-02-28 PROCEDURE — 99215 OFFICE O/P EST HI 40 MIN: CPT | Mod: S$PBB,,, | Performed by: SURGERY

## 2022-02-28 PROCEDURE — 99213 OFFICE O/P EST LOW 20 MIN: CPT | Mod: PBBFAC | Performed by: SURGERY

## 2022-02-28 PROCEDURE — 99215 PR OFFICE/OUTPT VISIT, EST, LEVL V, 40-54 MIN: ICD-10-PCS | Mod: S$PBB,,, | Performed by: SURGERY

## 2022-02-28 NOTE — PATIENT INSTRUCTIONS
[facility]  [address] [place] [address]      YOUR SURGERY DATE IS 3/7/22    LAB , COVID TESTING IS SCHEDULED FOR 3/2/22 at 9:00 A.M. PLEASE SIGN IN ON 1ST FLOOR OF THE  [facility] FOR LAB.       DO NOT EAT OR DRINK ANYTHING AFTER MIDNIGHT BEFORE YOU SURGERY    YOU MUST QUARANTINE FROM TIME OF COVID TESTING UNTIL SURGERY.    BRING ALL MEDICATIONS YOU ARE CURRENTLY TAKING WITH YOU.  IF YOU ARE TAKING  A BLOOD PRESSURE MEDICATION, TAKE YOUR BLOOD PRESSURE MEDICATION WITH A   SIP OF WATER WHEN YOU GET UP THE MORNING OF SURGERY.     YOU MUST PRE-ADMIT AT THE FOLLOWING WEBSITE:  WEBSITE: [email or web address]  PASSWORD: [email or web address]    A STAFF MEMBER FROM THE [facility] WILL CALL YOU THE DAY BEFORE  SURGERY TO LET YOU KNOW WHAT TIME TO ARRIVE THE MORNING OF SURGERY.  IF YOU HAVE NOT HEARD FROM THEM BY 4 P.M. THE DAY BEFORE YOUR SURGERY,   PLEASE CALL THEM -[phone].      IF YOU HAVE ANY QUESTIONS YOU MAY CONTACT OUR OFFICE -[phone].

## 2022-03-01 NOTE — PROGRESS NOTES
Subjective:       Patient ID: Yeny Acosta is a 26 y.o. female.    Chief Complaint: Abdominal Pain (Nausea and vomiting)    27 y/o female patient with prior hysterctomy and repair of incisional hernia.  She has recently experienced pain, nausea and emesis after eating.  W/u negative for gallstones, but HIDA performed and EF was 32%.  She is referred for possible surgical management.     Abdominal Pain  Pertinent negatives include no fever.       family history includes Breast cancer in her other; Diabetes in her other.  Past Medical History:   Diagnosis Date    Cardiac murmur     Endometriosis     Migraine headache 11/13/2019    PCOS (polycystic ovarian syndrome)     Periumbilical hernia 08/20/2020    repaired by Dr. Dwyer on 10/07/2020    Retroflexion of uterus 07/31/2018    3rd degree      Past Surgical History:   Procedure Laterality Date    Cyst removed from cervix      Cyst removed from ovary      DILATION AND CURETTAGE OF UTERUS      HERNIA REPAIR      HYSTERECTOMY      LOOP ELECTROSURGICAL EXCISION PROCEDURE (LEEP)      Operative Laparoscopy with lysis of adhesions      RH/BSO with limited cystoscopy  09/24/2018       reports that she has never smoked. She has never used smokeless tobacco. She reports that she does not drink alcohol and does not use drugs.     Review of Systems   Constitutional: Negative for appetite change and fever.   HENT: Negative for sore throat. Hearing loss: cough.    Eyes: Negative for visual disturbance.   Respiratory: Negative for cough and shortness of breath.    Cardiovascular: Negative for chest pain.   Gastrointestinal: Negative for abdominal pain.   Endocrine: Negative.    Genitourinary: Negative.    Musculoskeletal: Negative.    Skin: Negative for rash and wound.   Neurological: Negative for numbness.   Hematological: Negative.    Psychiatric/Behavioral: Negative.           Objective:      There were no vitals taken for this visit.   Physical  Exam  Constitutional:       Appearance: She is normal weight.   Eyes:      General: No scleral icterus.  Pulmonary:      Effort: Pulmonary effort is normal.      Breath sounds: Normal breath sounds.   Abdominal:      Palpations: Abdomen is soft. There is no mass.      Tenderness: There is no abdominal tenderness.      Hernia: No hernia is present.   Musculoskeletal:         General: No swelling.      Right lower leg: No edema.   Skin:     General: Skin is warm.   Neurological:      General: No focal deficit present.      Motor: No weakness.           Assessment/Plan:         Dyskinesia of gallbladder  -     Ambulatory referral/consult to General Surgery  -     CBC Auto Differential; Future; Expected date: 03/02/2022  -     Comprehensive Metabolic Panel; Future; Expected date: 03/02/2022  -     Ambulatory Referral to External Surgery    Encounter for laboratory testing for COVID-19 virus    Other orders  -     COVID-19 Routine Screening; Future; Expected date: 02/28/2022         Problem List Items Addressed This Visit        GI    Dyskinesia of gallbladder - Primary    Current Assessment & Plan     Risks and benefits of surgery discussed to include infection, bleeding, hernia, possible drain, duct injury, open conversion and unforeseen.  Patient expresses understanding and wishes to proceed.           Relevant Orders    CBC Auto Differential    Comprehensive Metabolic Panel    Ambulatory Referral to External Surgery      Other Visit Diagnoses     Encounter for laboratory testing for COVID-19 virus

## 2022-03-01 NOTE — H&P (VIEW-ONLY)
Subjective:       Patient ID: Yeny Acosta is a 26 y.o. female.    Chief Complaint: Abdominal Pain (Nausea and vomiting)    25 y/o female patient with prior hysterctomy and repair of incisional hernia.  She has recently experienced pain, nausea and emesis after eating.  W/u negative for gallstones, but HIDA performed and EF was 32%.  She is referred for possible surgical management.     Abdominal Pain  Pertinent negatives include no fever.       family history includes Breast cancer in her other; Diabetes in her other.  Past Medical History:   Diagnosis Date    Cardiac murmur     Endometriosis     Migraine headache 11/13/2019    PCOS (polycystic ovarian syndrome)     Periumbilical hernia 08/20/2020    repaired by Dr. Dwyer on 10/07/2020    Retroflexion of uterus 07/31/2018    3rd degree      Past Surgical History:   Procedure Laterality Date    Cyst removed from cervix      Cyst removed from ovary      DILATION AND CURETTAGE OF UTERUS      HERNIA REPAIR      HYSTERECTOMY      LOOP ELECTROSURGICAL EXCISION PROCEDURE (LEEP)      Operative Laparoscopy with lysis of adhesions      RH/BSO with limited cystoscopy  09/24/2018       reports that she has never smoked. She has never used smokeless tobacco. She reports that she does not drink alcohol and does not use drugs.     Review of Systems   Constitutional: Negative for appetite change and fever.   HENT: Negative for sore throat. Hearing loss: cough.    Eyes: Negative for visual disturbance.   Respiratory: Negative for cough and shortness of breath.    Cardiovascular: Negative for chest pain.   Gastrointestinal: Negative for abdominal pain.   Endocrine: Negative.    Genitourinary: Negative.    Musculoskeletal: Negative.    Skin: Negative for rash and wound.   Neurological: Negative for numbness.   Hematological: Negative.    Psychiatric/Behavioral: Negative.           Objective:      There were no vitals taken for this visit.   Physical  Exam  Constitutional:       Appearance: She is normal weight.   Eyes:      General: No scleral icterus.  Pulmonary:      Effort: Pulmonary effort is normal.      Breath sounds: Normal breath sounds.   Abdominal:      Palpations: Abdomen is soft. There is no mass.      Tenderness: There is no abdominal tenderness.      Hernia: No hernia is present.   Musculoskeletal:         General: No swelling.      Right lower leg: No edema.   Skin:     General: Skin is warm.   Neurological:      General: No focal deficit present.      Motor: No weakness.           Assessment/Plan:         Dyskinesia of gallbladder  -     Ambulatory referral/consult to General Surgery  -     CBC Auto Differential; Future; Expected date: 03/02/2022  -     Comprehensive Metabolic Panel; Future; Expected date: 03/02/2022  -     Ambulatory Referral to External Surgery    Encounter for laboratory testing for COVID-19 virus    Other orders  -     COVID-19 Routine Screening; Future; Expected date: 02/28/2022         Problem List Items Addressed This Visit        GI    Dyskinesia of gallbladder - Primary    Current Assessment & Plan     Risks and benefits of surgery discussed to include infection, bleeding, hernia, possible drain, duct injury, open conversion and unforeseen.  Patient expresses understanding and wishes to proceed.           Relevant Orders    CBC Auto Differential    Comprehensive Metabolic Panel    Ambulatory Referral to External Surgery      Other Visit Diagnoses     Encounter for laboratory testing for COVID-19 virus

## 2022-03-01 NOTE — ASSESSMENT & PLAN NOTE
Risks and benefits of surgery discussed to include infection, bleeding, hernia, possible drain, duct injury, open conversion and unforeseen.  Patient expresses understanding and wishes to proceed.

## 2022-03-02 DIAGNOSIS — K82.8 DYSKINESIA OF GALLBLADDER: Primary | ICD-10-CM

## 2022-03-08 RX ORDER — DULOXETIN HYDROCHLORIDE 30 MG/1
30 CAPSULE, DELAYED RELEASE ORAL
COMMUNITY
End: 2022-03-12 | Stop reason: CLARIF

## 2022-03-08 RX ORDER — ALBUTEROL SULFATE 90 UG/1
AEROSOL, METERED RESPIRATORY (INHALATION)
COMMUNITY
End: 2022-03-08 | Stop reason: SDUPTHER

## 2022-03-08 RX ORDER — PROCHLORPERAZINE MALEATE 10 MG
TABLET ORAL
COMMUNITY
End: 2022-03-12 | Stop reason: CLARIF

## 2022-03-08 RX ORDER — PROMETHAZINE HYDROCHLORIDE 25 MG/1
TABLET ORAL
COMMUNITY
End: 2022-06-13

## 2022-03-08 RX ORDER — BUTALBITAL, ACETAMINOPHEN AND CAFFEINE 50; 325; 40 MG/1; MG/1; MG/1
TABLET ORAL
COMMUNITY
Start: 2021-12-08 | End: 2022-03-12 | Stop reason: CLARIF

## 2022-03-08 RX ORDER — BUSPIRONE HYDROCHLORIDE 30 MG/1
30 TABLET ORAL
COMMUNITY
End: 2022-03-12 | Stop reason: CLARIF

## 2022-03-08 RX ORDER — SUMATRIPTAN SUCCINATE 3 MG/1
INJECTION, SOLUTION SUBCUTANEOUS
Status: ON HOLD | COMMUNITY
Start: 2022-02-07 | End: 2022-03-09 | Stop reason: HOSPADM

## 2022-03-08 RX ORDER — RIZATRIPTAN BENZOATE 10 MG/1
TABLET, ORALLY DISINTEGRATING ORAL
COMMUNITY
End: 2022-03-12 | Stop reason: CLARIF

## 2022-03-08 RX ORDER — IBUPROFEN 800 MG/1
TABLET ORAL
COMMUNITY
End: 2023-07-18

## 2022-03-08 RX ORDER — CYANOCOBALAMIN 1000 UG/ML
INJECTION, SOLUTION INTRAMUSCULAR; SUBCUTANEOUS
COMMUNITY
End: 2022-06-13 | Stop reason: SDUPTHER

## 2022-03-08 RX ORDER — GALCANEZUMAB 120 MG/ML
INJECTION, SOLUTION SUBCUTANEOUS
COMMUNITY
Start: 2022-02-04 | End: 2022-03-12 | Stop reason: CLARIF

## 2022-03-08 RX ORDER — RIMEGEPANT SULFATE 75 MG/75MG
TABLET, ORALLY DISINTEGRATING ORAL
COMMUNITY
Start: 2022-02-16 | End: 2022-03-12 | Stop reason: CLARIF

## 2022-03-08 RX ORDER — FLUTICASONE PROPIONATE AND SALMETEROL 250; 50 UG/1; UG/1
POWDER RESPIRATORY (INHALATION)
COMMUNITY
Start: 2021-12-26 | End: 2022-03-08 | Stop reason: SDUPTHER

## 2022-03-08 RX ORDER — PROMETHAZINE HYDROCHLORIDE 25 MG/1
TABLET ORAL
COMMUNITY
Start: 2022-02-28 | End: 2022-03-08 | Stop reason: SDUPTHER

## 2022-03-08 RX ORDER — TIZANIDINE 4 MG/1
TABLET ORAL
COMMUNITY
Start: 2022-02-07 | End: 2022-03-08 | Stop reason: SDUPTHER

## 2022-03-08 RX ORDER — ONDANSETRON 4 MG/1
TABLET, ORALLY DISINTEGRATING ORAL
COMMUNITY
End: 2022-09-28

## 2022-03-08 RX ORDER — NORETHINDRONE 5 MG/1
5 TABLET ORAL
COMMUNITY
End: 2022-03-12 | Stop reason: CLARIF

## 2022-03-08 RX ORDER — ONDANSETRON 4 MG/1
4 TABLET, ORALLY DISINTEGRATING ORAL 2 TIMES DAILY PRN
COMMUNITY
Start: 2022-03-04 | End: 2022-03-08 | Stop reason: SDUPTHER

## 2022-03-08 RX ORDER — IBUPROFEN 800 MG/1
TABLET ORAL
COMMUNITY
Start: 2022-02-28 | End: 2022-03-12 | Stop reason: CLARIF

## 2022-03-08 RX ORDER — MONTELUKAST SODIUM 10 MG/1
10 TABLET ORAL NIGHTLY
COMMUNITY
Start: 2022-03-02 | End: 2022-03-08 | Stop reason: SDUPTHER

## 2022-03-08 RX ORDER — MONTELUKAST SODIUM 10 MG/1
TABLET ORAL
COMMUNITY
End: 2022-12-01 | Stop reason: SDUPTHER

## 2022-03-08 RX ORDER — CYANOCOBALAMIN 1000 UG/ML
1000 INJECTION, SOLUTION INTRAMUSCULAR; SUBCUTANEOUS
COMMUNITY
Start: 2022-02-14 | End: 2022-03-08 | Stop reason: SDUPTHER

## 2022-03-08 RX ORDER — EPINEPHRINE 0.3 MG/.3ML
INJECTION SUBCUTANEOUS
COMMUNITY
End: 2022-09-29 | Stop reason: SDUPTHER

## 2022-03-08 RX ORDER — PROCHLORPERAZINE MALEATE 10 MG
TABLET ORAL
COMMUNITY
Start: 2021-10-11 | End: 2022-03-08 | Stop reason: SDUPTHER

## 2022-03-08 RX ORDER — AMOXICILLIN 500 MG/1
CAPSULE ORAL
COMMUNITY
End: 2022-03-12 | Stop reason: CLARIF

## 2022-03-08 RX ORDER — RIMEGEPANT SULFATE 75 MG/75MG
TABLET, ORALLY DISINTEGRATING ORAL
COMMUNITY
End: 2022-03-12 | Stop reason: CLARIF

## 2022-03-08 RX ORDER — PREDNISONE 20 MG/1
TABLET ORAL
COMMUNITY
End: 2022-03-12 | Stop reason: CLARIF

## 2022-03-08 RX ORDER — PROMETHAZINE HYDROCHLORIDE 12.5 MG/1
12.5 TABLET ORAL EVERY 6 HOURS PRN
COMMUNITY
End: 2022-03-12 | Stop reason: CLARIF

## 2022-03-08 RX ORDER — FLUTICASONE PROPIONATE AND SALMETEROL 250; 50 UG/1; UG/1
POWDER RESPIRATORY (INHALATION)
COMMUNITY
End: 2022-12-01 | Stop reason: SDUPTHER

## 2022-03-08 RX ORDER — SUMATRIPTAN SUCCINATE 3 MG/1
INJECTION, SOLUTION SUBCUTANEOUS
Status: ON HOLD | COMMUNITY
End: 2022-03-09 | Stop reason: HOSPADM

## 2022-03-08 RX ORDER — EPINEPHRINE 0.3 MG/.3ML
INJECTION SUBCUTANEOUS
COMMUNITY
Start: 2022-01-24 | End: 2022-03-08 | Stop reason: SDUPTHER

## 2022-03-08 RX ORDER — TIZANIDINE 4 MG/1
TABLET ORAL
Status: ON HOLD | COMMUNITY
End: 2022-03-09 | Stop reason: HOSPADM

## 2022-03-08 RX ORDER — TRAZODONE HYDROCHLORIDE 100 MG/1
100 TABLET ORAL
Status: ON HOLD | COMMUNITY
End: 2022-03-09 | Stop reason: HOSPADM

## 2022-03-08 RX ORDER — PREDNISONE 20 MG/1
TABLET ORAL
COMMUNITY
Start: 2021-10-18 | End: 2022-03-08 | Stop reason: SDUPTHER

## 2022-03-08 RX ORDER — AMOXICILLIN 500 MG/1
CAPSULE ORAL
COMMUNITY
Start: 2022-01-25 | End: 2022-03-08 | Stop reason: SDUPTHER

## 2022-03-08 RX ORDER — PREDNISONE 10 MG/1
TABLET ORAL
COMMUNITY
End: 2022-03-12 | Stop reason: CLARIF

## 2022-03-08 RX ORDER — GALCANEZUMAB 120 MG/ML
INJECTION, SOLUTION SUBCUTANEOUS
COMMUNITY
End: 2022-09-28

## 2022-03-08 RX ORDER — TRIAMCINOLONE ACETONIDE 1 MG/G
OINTMENT TOPICAL
COMMUNITY
End: 2022-03-09

## 2022-03-08 RX ORDER — CETIRIZINE HYDROCHLORIDE 10 MG/1
TABLET ORAL
COMMUNITY
End: 2022-09-28

## 2022-03-08 RX ORDER — RIZATRIPTAN BENZOATE 10 MG/1
TABLET, ORALLY DISINTEGRATING ORAL
COMMUNITY
Start: 2021-11-04 | End: 2022-03-08 | Stop reason: SDUPTHER

## 2022-03-09 ENCOUNTER — ANESTHESIA (OUTPATIENT)
Dept: SURGERY | Facility: HOSPITAL | Age: 27
End: 2022-03-09
Payer: COMMERCIAL

## 2022-03-09 ENCOUNTER — HOSPITAL ENCOUNTER (OUTPATIENT)
Facility: HOSPITAL | Age: 27
Discharge: HOME | End: 2022-03-09
Attending: SURGERY | Admitting: SURGERY
Payer: COMMERCIAL

## 2022-03-09 ENCOUNTER — ANESTHESIA EVENT (OUTPATIENT)
Dept: SURGERY | Facility: HOSPITAL | Age: 27
End: 2022-03-09
Payer: COMMERCIAL

## 2022-03-09 VITALS
HEIGHT: 68 IN | RESPIRATION RATE: 16 BRPM | OXYGEN SATURATION: 96 % | TEMPERATURE: 98 F | BODY MASS INDEX: 34.1 KG/M2 | DIASTOLIC BLOOD PRESSURE: 69 MMHG | HEART RATE: 69 BPM | WEIGHT: 225 LBS | SYSTOLIC BLOOD PRESSURE: 113 MMHG

## 2022-03-09 DIAGNOSIS — K82.8 DYSKINESIA OF GALLBLADDER: Primary | ICD-10-CM

## 2022-03-09 PROCEDURE — 36000708 HC OR TIME LEV III 1ST 15 MIN: Performed by: SURGERY

## 2022-03-09 PROCEDURE — 47562 PR LAP,CHOLECYSTECTOMY: ICD-10-PCS | Mod: ,,, | Performed by: SURGERY

## 2022-03-09 PROCEDURE — 63600175 PHARM REV CODE 636 W HCPCS: Performed by: ANESTHESIOLOGY

## 2022-03-09 PROCEDURE — 27000689 HC BLADE LARYNGOSCOPE ANY SIZE: Performed by: ANESTHESIOLOGY

## 2022-03-09 PROCEDURE — 88304 SURGICAL PATHOLOGY: ICD-10-PCS | Mod: 26,,, | Performed by: PATHOLOGY

## 2022-03-09 PROCEDURE — 88304 TISSUE EXAM BY PATHOLOGIST: CPT | Mod: SUR | Performed by: SURGERY

## 2022-03-09 PROCEDURE — 27201423 OPTIME MED/SURG SUP & DEVICES STERILE SUPPLY: Performed by: SURGERY

## 2022-03-09 PROCEDURE — 71000016 HC POSTOP RECOV ADDL HR: Performed by: SURGERY

## 2022-03-09 PROCEDURE — 27000510 HC BLANKET BAIR HUGGER ANY SIZE: Performed by: ANESTHESIOLOGY

## 2022-03-09 PROCEDURE — 25000003 PHARM REV CODE 250: Performed by: NURSE ANESTHETIST, CERTIFIED REGISTERED

## 2022-03-09 PROCEDURE — 27000716 HC OXISENSOR PROBE, ANY SIZE: Performed by: ANESTHESIOLOGY

## 2022-03-09 PROCEDURE — 47562 LAPAROSCOPIC CHOLECYSTECTOMY: CPT | Mod: ,,, | Performed by: SURGERY

## 2022-03-09 PROCEDURE — 63600175 PHARM REV CODE 636 W HCPCS: Performed by: NURSE ANESTHETIST, CERTIFIED REGISTERED

## 2022-03-09 PROCEDURE — 63600175 PHARM REV CODE 636 W HCPCS: Performed by: SURGERY

## 2022-03-09 PROCEDURE — 88304 TISSUE EXAM BY PATHOLOGIST: CPT | Mod: 26,,, | Performed by: PATHOLOGY

## 2022-03-09 PROCEDURE — 71000015 HC POSTOP RECOV 1ST HR: Performed by: SURGERY

## 2022-03-09 PROCEDURE — 37000008 HC ANESTHESIA 1ST 15 MINUTES: Performed by: SURGERY

## 2022-03-09 PROCEDURE — D9220A PRA ANESTHESIA: Mod: ANES,,, | Performed by: ANESTHESIOLOGY

## 2022-03-09 PROCEDURE — 27000655: Performed by: ANESTHESIOLOGY

## 2022-03-09 PROCEDURE — D9220A PRA ANESTHESIA: ICD-10-PCS | Mod: ANES,,, | Performed by: ANESTHESIOLOGY

## 2022-03-09 PROCEDURE — 36000709 HC OR TIME LEV III EA ADD 15 MIN: Performed by: SURGERY

## 2022-03-09 PROCEDURE — 27000260 *HC AIRWAY ORAL: Performed by: ANESTHESIOLOGY

## 2022-03-09 PROCEDURE — 37000009 HC ANESTHESIA EA ADD 15 MINS: Performed by: SURGERY

## 2022-03-09 PROCEDURE — 27000165 HC TUBE, ETT CUFFED: Performed by: ANESTHESIOLOGY

## 2022-03-09 PROCEDURE — 71000033 HC RECOVERY, INTIAL HOUR: Performed by: SURGERY

## 2022-03-09 PROCEDURE — 27000509 HC TUBE SALEM SUMP ANY SIZE: Performed by: ANESTHESIOLOGY

## 2022-03-09 PROCEDURE — D9220A PRA ANESTHESIA: ICD-10-PCS | Mod: CRNA,,, | Performed by: NURSE ANESTHETIST, CERTIFIED REGISTERED

## 2022-03-09 PROCEDURE — D9220A PRA ANESTHESIA: Mod: CRNA,,, | Performed by: NURSE ANESTHETIST, CERTIFIED REGISTERED

## 2022-03-09 RX ORDER — FENTANYL CITRATE 50 UG/ML
INJECTION, SOLUTION INTRAMUSCULAR; INTRAVENOUS
Status: DISCONTINUED | OUTPATIENT
Start: 2022-03-09 | End: 2022-03-09

## 2022-03-09 RX ORDER — ONDANSETRON 2 MG/ML
4 INJECTION INTRAMUSCULAR; INTRAVENOUS DAILY PRN
Status: DISCONTINUED | OUTPATIENT
Start: 2022-03-09 | End: 2022-03-09 | Stop reason: HOSPADM

## 2022-03-09 RX ORDER — MORPHINE SULFATE 10 MG/ML
4 INJECTION INTRAMUSCULAR; INTRAVENOUS; SUBCUTANEOUS ONCE
Status: DISCONTINUED | OUTPATIENT
Start: 2022-03-09 | End: 2022-03-09 | Stop reason: HOSPADM

## 2022-03-09 RX ORDER — DIPHENHYDRAMINE HYDROCHLORIDE 50 MG/ML
25 INJECTION INTRAMUSCULAR; INTRAVENOUS EVERY 6 HOURS PRN
Status: DISCONTINUED | OUTPATIENT
Start: 2022-03-09 | End: 2022-03-09 | Stop reason: HOSPADM

## 2022-03-09 RX ORDER — MIDAZOLAM HYDROCHLORIDE 1 MG/ML
INJECTION INTRAMUSCULAR; INTRAVENOUS
Status: DISCONTINUED | OUTPATIENT
Start: 2022-03-09 | End: 2022-03-09

## 2022-03-09 RX ORDER — DEXAMETHASONE SODIUM PHOSPHATE 4 MG/ML
INJECTION, SOLUTION INTRA-ARTICULAR; INTRALESIONAL; INTRAMUSCULAR; INTRAVENOUS; SOFT TISSUE
Status: DISCONTINUED | OUTPATIENT
Start: 2022-03-09 | End: 2022-03-09

## 2022-03-09 RX ORDER — ROCURONIUM BROMIDE 10 MG/ML
INJECTION, SOLUTION INTRAVENOUS
Status: DISCONTINUED | OUTPATIENT
Start: 2022-03-09 | End: 2022-03-09

## 2022-03-09 RX ORDER — MEPERIDINE HYDROCHLORIDE 25 MG/ML
25 INJECTION INTRAMUSCULAR; INTRAVENOUS; SUBCUTANEOUS EVERY 10 MIN PRN
Status: DISCONTINUED | OUTPATIENT
Start: 2022-03-09 | End: 2022-03-09 | Stop reason: HOSPADM

## 2022-03-09 RX ORDER — MORPHINE SULFATE 10 MG/ML
3 INJECTION INTRAMUSCULAR; INTRAVENOUS; SUBCUTANEOUS
Status: DISCONTINUED | OUTPATIENT
Start: 2022-03-09 | End: 2022-03-09 | Stop reason: HOSPADM

## 2022-03-09 RX ORDER — SODIUM CHLORIDE, SODIUM LACTATE, POTASSIUM CHLORIDE, CALCIUM CHLORIDE 600; 310; 30; 20 MG/100ML; MG/100ML; MG/100ML; MG/100ML
INJECTION, SOLUTION INTRAVENOUS CONTINUOUS
Status: DISCONTINUED | OUTPATIENT
Start: 2022-03-09 | End: 2022-03-09 | Stop reason: HOSPADM

## 2022-03-09 RX ORDER — IBUPROFEN 600 MG/1
600 TABLET ORAL EVERY 6 HOURS PRN
Status: DISCONTINUED | OUTPATIENT
Start: 2022-03-09 | End: 2022-03-09 | Stop reason: HOSPADM

## 2022-03-09 RX ORDER — ONDANSETRON 2 MG/ML
INJECTION INTRAMUSCULAR; INTRAVENOUS
Status: DISCONTINUED | OUTPATIENT
Start: 2022-03-09 | End: 2022-03-09

## 2022-03-09 RX ORDER — PROPOFOL 10 MG/ML
VIAL (ML) INTRAVENOUS
Status: DISCONTINUED | OUTPATIENT
Start: 2022-03-09 | End: 2022-03-09

## 2022-03-09 RX ORDER — HYDROMORPHONE HYDROCHLORIDE 2 MG/ML
0.5 INJECTION, SOLUTION INTRAMUSCULAR; INTRAVENOUS; SUBCUTANEOUS EVERY 5 MIN PRN
Status: COMPLETED | OUTPATIENT
Start: 2022-03-09 | End: 2022-03-09

## 2022-03-09 RX ORDER — OXYCODONE HYDROCHLORIDE 5 MG/1
5 TABLET ORAL
Status: DISCONTINUED | OUTPATIENT
Start: 2022-03-09 | End: 2022-03-09 | Stop reason: HOSPADM

## 2022-03-09 RX ORDER — MORPHINE SULFATE 10 MG/ML
4 INJECTION INTRAMUSCULAR; INTRAVENOUS; SUBCUTANEOUS EVERY 5 MIN PRN
Status: DISCONTINUED | OUTPATIENT
Start: 2022-03-09 | End: 2022-03-09 | Stop reason: HOSPADM

## 2022-03-09 RX ORDER — HYDROCODONE BITARTRATE AND ACETAMINOPHEN 7.5; 325 MG/1; MG/1
1 TABLET ORAL EVERY 6 HOURS PRN
Qty: 24 TABLET | Refills: 0 | Status: SHIPPED | OUTPATIENT
Start: 2022-03-09 | End: 2022-06-13

## 2022-03-09 RX ORDER — SODIUM CHLORIDE, SODIUM LACTATE, POTASSIUM CHLORIDE, CALCIUM CHLORIDE 600; 310; 30; 20 MG/100ML; MG/100ML; MG/100ML; MG/100ML
INJECTION, SOLUTION INTRAVENOUS CONTINUOUS PRN
Status: DISCONTINUED | OUTPATIENT
Start: 2022-03-09 | End: 2022-03-09

## 2022-03-09 RX ORDER — LIDOCAINE HYDROCHLORIDE 20 MG/ML
INJECTION, SOLUTION EPIDURAL; INFILTRATION; INTRACAUDAL; PERINEURAL
Status: DISCONTINUED | OUTPATIENT
Start: 2022-03-09 | End: 2022-03-09

## 2022-03-09 RX ADMIN — HYDROMORPHONE HYDROCHLORIDE 0.5 MG: 2 INJECTION INTRAMUSCULAR; INTRAVENOUS; SUBCUTANEOUS at 08:03

## 2022-03-09 RX ADMIN — SODIUM CHLORIDE, POTASSIUM CHLORIDE, SODIUM LACTATE AND CALCIUM CHLORIDE: 600; 310; 30; 20 INJECTION, SOLUTION INTRAVENOUS at 07:03

## 2022-03-09 RX ADMIN — LIDOCAINE HYDROCHLORIDE 50 MG: 20 INJECTION, SOLUTION EPIDURAL; INFILTRATION; INTRACAUDAL; PERINEURAL at 07:03

## 2022-03-09 RX ADMIN — CEFAZOLIN 2 G: 1 INJECTION, POWDER, FOR SOLUTION INTRAMUSCULAR; INTRAVENOUS; PARENTERAL at 07:03

## 2022-03-09 RX ADMIN — ONDANSETRON 4 MG: 2 INJECTION INTRAMUSCULAR; INTRAVENOUS at 07:03

## 2022-03-09 RX ADMIN — DEXAMETHASONE SODIUM PHOSPHATE 4 MG: 4 INJECTION, SOLUTION INTRA-ARTICULAR; INTRALESIONAL; INTRAMUSCULAR; INTRAVENOUS; SOFT TISSUE at 07:03

## 2022-03-09 RX ADMIN — ROCURONIUM BROMIDE 20 MG: 10 INJECTION, SOLUTION INTRAVENOUS at 07:03

## 2022-03-09 RX ADMIN — PROPOFOL 200 MG: 10 INJECTION, EMULSION INTRAVENOUS at 07:03

## 2022-03-09 RX ADMIN — SUGAMMADEX 200 MG: 100 INJECTION, SOLUTION INTRAVENOUS at 08:03

## 2022-03-09 RX ADMIN — HYDROMORPHONE HYDROCHLORIDE 0.5 MG: 2 INJECTION INTRAMUSCULAR; INTRAVENOUS; SUBCUTANEOUS at 09:03

## 2022-03-09 RX ADMIN — FENTANYL CITRATE 100 MCG: 50 INJECTION INTRAMUSCULAR; INTRAVENOUS at 07:03

## 2022-03-09 RX ADMIN — MIDAZOLAM 2 MG: 1 INJECTION INTRAMUSCULAR; INTRAVENOUS at 07:03

## 2022-03-09 RX ADMIN — ROCURONIUM BROMIDE 30 MG: 10 INJECTION, SOLUTION INTRAVENOUS at 07:03

## 2022-03-09 NOTE — TRANSFER OF CARE
"Anesthesia Transfer of Care Note    Patient: Yeny Acosta    Procedure(s) Performed: Procedure(s) (LRB):  CHOLECYSTECTOMY, LAPAROSCOPIC VS OPEN (N/A)    Patient location: PACU    Anesthesia Type: general    Transport from OR: Transported from OR on room air with adequate spontaneous ventilation    Post pain: adequate analgesia    Post assessment: no apparent anesthetic complications    Post vital signs: stable    Level of consciousness: responds to stimulation, awake and sedated    Nausea/Vomiting: no nausea/vomiting    Complications: none    Transfer of care protocol was followed      Last vitals:   Visit Vitals  BP (!) 146/87 (BP Location: Right arm, Patient Position: Lying)   Pulse 107   Temp 36.4 °C (97.6 °F) (Oral)   Resp (!) 23   Ht 5' 8" (1.727 m)   Wt 102.1 kg (225 lb)   SpO2 95%   Breastfeeding No   BMI 34.21 kg/m²     "

## 2022-03-09 NOTE — INTERVAL H&P NOTE
The patient has been examined and the H&P has been reviewed:    I concur with the findings and no changes have occurred since H&P was written.    Surgery risks, benefits and alternative options discussed and understood by patient/family.

## 2022-03-09 NOTE — ANESTHESIA PREPROCEDURE EVALUATION
03/09/2022  Yeny Acosta is a 26 y.o., female.      Pre-op Assessment    I have reviewed the Patient Summary Reports.    I have reviewed the NPO Status.   I have reviewed the Medications.     Review of Systems  Social:  Non-Smoker, No Alcohol Use    Hematology/Oncology:  Hematology Normal   Oncology Normal     EENT/Dental:EENT/Dental Normal   Cardiovascular:  Cardiovascular Normal     Pulmonary:   Asthma    Renal/:  Renal/ Normal     Hepatic/GI:  Hepatic/GI Normal    Musculoskeletal:  Musculoskeletal Normal    Neurological:   Headaches    Endocrine:  Endocrine Normal    Dermatological:  Skin Normal    Psych:  Psychiatric Normal           Chemistry        Component Value Date/Time     03/07/2022 1136    K 4.5 03/07/2022 1136     03/07/2022 1136    CO2 28 03/07/2022 1136    BUN 6 (L) 03/07/2022 1136    CREATININE 0.53 (L) 03/07/2022 1136    GLU 90 03/07/2022 1136        Component Value Date/Time    CALCIUM 8.7 03/07/2022 1136    ALKPHOS 65 03/07/2022 1136    AST 15 03/07/2022 1136    ALT 19 03/07/2022 1136    BILITOT 0.3 03/07/2022 1136    ESTGFRAFRICA 158 07/24/2020 1042    EGFRNONAA 151 03/07/2022 1136        Lab Results   Component Value Date    WBC 7.40 03/07/2022    RBC 4.59 03/07/2022    HGB 12.6 03/07/2022    MCV 85.2 03/07/2022    MCH 27.5 03/07/2022    MCHC 32.2 03/07/2022    RDW 11.7 03/07/2022     (H) 03/07/2022    MPV 10.1 03/07/2022    LYMPH 26.9 (L) 03/07/2022    LYMPH 1.99 03/07/2022    MONO 7.3 (H) 03/07/2022    EOS 0.07 03/07/2022    BASO 0.04 03/07/2022     No results found for this or any previous visit.      Physical Exam  General: Well nourished, Cooperative, Alert and Oriented    Airway:  Mallampati: II / II  Mouth Opening: Normal  TM Distance: Normal  Neck ROM: Normal ROM    Dental:  Intact    Chest/Lungs:  Clear to auscultation    Heart:  Rate:  Normal  Rhythm: Regular Rhythm  Sounds: Normal        Anesthesia Plan  Type of Anesthesia, risks & benefits discussed:    Anesthesia Type: Gen ETT  Intra-op Monitoring Plan: Standard ASA Monitors  Post Op Pain Control Plan: multimodal analgesia  Induction:  IV  Airway Plan: Direct  Informed Consent: Informed consent signed with the Patient and all parties understand the risks and agree with anesthesia plan.  All questions answered.   ASA Score: 2  Day of Surgery Review of History & Physical: H&P Update referred to the surgeon/provider.    Ready For Surgery From Anesthesia Perspective.     .

## 2022-03-09 NOTE — OP NOTE
[facility] - Periop Services     Operative Note    SUMMARY     Date of Procedure: 3/9/2022     Procedure: Procedure(s) (LRB):  CHOLECYSTECTOMY, LAPAROSCOPIC VS OPEN (N/A)     Surgeon(s) and Role:     * Denny Dwyer MD - Primary    Assisting Surgeon: None    Pre-Operative Diagnosis: Dyskinesia of gallbladder [K82.8]    Post-Operative Diagnosis: Post-Op Diagnosis Codes:     * Dyskinesia of gallbladder [K82.8]    Anesthesia: General    Technical Procedures Used:The patient was brought to the operating room and placed in supine position. General anesthesia was administered. The abdomen was prepped and draped in standard fashion. A supraumbilical incision was performed and dissection was carried down through subcutaneous tissue bluntly. The fascia was sharply transected and the peritoneum was carefully entered. The abdominal wall was thick, and this portion of the procedure was difficult. CO2 pneumoperitoneum was established after inserting Ra cannula. 5 mm trochars were then placed in the epigastrium, midepigastrium, and right upper quadrant. The fundus was retracted upward and over the liver, and the infundibulum was retracted laterally. The cystic duct and cystic artery were dissected out, clipped twice proximally, once distally and transected. Cautery was then used to dissect the gallbladder free from the surface of the liver.  There was good hemostasis. Port sites were then infiltrated with local at the level of the fascia and peritoneum. The gallbladder was removed using the push technique.   The Endoclose device was used to pass PDS suture in figure-of-eight fashion across the abdominal wall, closing the fascial defect at the supraumbilical site.  Ports were then removed and CO2 pneumoperitoneum was allowed to escape. The fascia of the supraumbilical incision was closed using interrupted PDS. All port sites closed skin level with Vicryl. Patient was awakened from anesthesia in stable condition.       Description of the Findings of the Procedure: Minimal inflammation. Duct and artery categorically identified prior to Clipping and transecting.  Closure with Endoclose device using PDS suture.     Assistant(s): PATRICIA Arias    Complications: No    Estimated Blood Loss (EBL): 5 mL           Implants: * No implants in log *    Specimens:   Specimen (24h ago, onward)             Start     Ordered    03/09/22 0803  Surgical Pathology  RELEASE UPON ORDERING         03/09/22 0803                 Condition: Good      Complications:  None

## 2022-03-09 NOTE — ANESTHESIA PROCEDURE NOTES
Intubation    Date/Time: 3/9/2022 7:28 AM  Performed by: Arturo Eaton CRNA  Authorized by: Enrique Mendoza MD     Intubation:     Induction:  Intravenous    Intubated:  Postinduction    Mask Ventilation:  Easy mask    Attempts:  1    Attempted By:  CRNA    Method of Intubation:  Direct    Blade:  Jeana 3    Laryngeal View Grade: Grade I - full view of cords      Difficult Airway Encountered?: No      Complications:  None    Airway Device:  Oral endotracheal tube    Airway Device Size:  7.0    Style/Cuff Inflation:  Cuffed (inflated to minimal occlusive pressure)    Tube secured:  21    Secured at:  The lips    Placement Verified By:  Capnometry and Revisualization with laryngoscopy    Complicating Factors:  None    Findings Post-Intubation:  BS equal bilateral and atraumatic/condition of teeth unchanged

## 2022-03-10 LAB
ESTROGEN SERPL-MCNC: NORMAL PG/ML
INSULIN SERPL-ACNC: NORMAL U[IU]/ML
LAB AP GROSS DESCRIPTION: NORMAL
LAB AP LABORATORY NOTES: NORMAL
T3RU NFR SERPL: NORMAL %

## 2022-03-10 NOTE — ANESTHESIA POSTPROCEDURE EVALUATION
Anesthesia Post Evaluation    Patient: Yeny Acosta    Procedure(s) Performed: Procedure(s) (LRB):  CHOLECYSTECTOMY, LAPAROSCOPIC (N/A)    Final Anesthesia Type: general      Patient location during evaluation: PACU  Patient participation: Yes- Able to Participate  Level of consciousness: awake and alert  Post-procedure vital signs: reviewed and stable  Pain management: adequate  Airway patency: patent  ROSIBEL mitigation strategies: Multimodal analgesia  PONV status at discharge: No PONV  Anesthetic complications: no      Cardiovascular status: blood pressure returned to baseline  Respiratory status: unassisted  Hydration status: euvolemic  Follow-up not needed.          Vitals Value Taken Time   /69 03/09/22 1100   Temp 36.4 °C (97.6 °F) 03/09/22 0846   Pulse 69 03/09/22 1100   Resp 16 03/09/22 0930   SpO2 96 % 03/09/22 1024         Event Time   Out of Recovery 09:24:43         Pain/Marko Score: Pain Rating Prior to Med Admin: 6 (3/9/2022  9:09 AM)  Marko Score: 9 (3/9/2022  9:24 AM)

## 2022-03-12 ENCOUNTER — HOSPITAL ENCOUNTER (EMERGENCY)
Facility: HOSPITAL | Age: 27
Discharge: HOME | End: 2022-03-12
Payer: COMMERCIAL

## 2022-03-12 VITALS
SYSTOLIC BLOOD PRESSURE: 130 MMHG | HEART RATE: 73 BPM | RESPIRATION RATE: 18 BRPM | WEIGHT: 226 LBS | BODY MASS INDEX: 34.25 KG/M2 | DIASTOLIC BLOOD PRESSURE: 76 MMHG | OXYGEN SATURATION: 99 % | TEMPERATURE: 99 F | HEIGHT: 68 IN

## 2022-03-12 DIAGNOSIS — R10.9 ABDOMINAL PAIN: ICD-10-CM

## 2022-03-12 DIAGNOSIS — K59.00 CONSTIPATION, UNSPECIFIED CONSTIPATION TYPE: Primary | ICD-10-CM

## 2022-03-12 LAB
ANION GAP SERPL CALCULATED.3IONS-SCNC: 13 MMOL/L (ref 7–16)
BASOPHILS # BLD AUTO: 0.05 K/UL (ref 0–0.2)
BASOPHILS NFR BLD AUTO: 0.5 % (ref 0–1)
BILIRUB UR QL STRIP: NEGATIVE
BUN SERPL-MCNC: 9 MG/DL (ref 7–18)
BUN/CREAT SERPL: 16 (ref 6–20)
CALCIUM SERPL-MCNC: 8.6 MG/DL (ref 8.5–10.1)
CHLORIDE SERPL-SCNC: 101 MMOL/L (ref 98–107)
CLARITY UR: CLEAR
CO2 SERPL-SCNC: 28 MMOL/L (ref 21–32)
COLOR UR: YELLOW
CREAT SERPL-MCNC: 0.56 MG/DL (ref 0.55–1.02)
DIFFERENTIAL METHOD BLD: ABNORMAL
EOSINOPHIL # BLD AUTO: 0.08 K/UL (ref 0–0.5)
EOSINOPHIL NFR BLD AUTO: 0.7 % (ref 1–4)
ERYTHROCYTE [DISTWIDTH] IN BLOOD BY AUTOMATED COUNT: 11.4 % (ref 11.5–14.5)
GLUCOSE SERPL-MCNC: 97 MG/DL (ref 74–106)
GLUCOSE UR STRIP-MCNC: NEGATIVE MG/DL
HCT VFR BLD AUTO: 36.2 % (ref 38–47)
HGB BLD-MCNC: 11.8 G/DL (ref 12–16)
IMM GRANULOCYTES # BLD AUTO: 0.02 K/UL (ref 0–0.04)
IMM GRANULOCYTES NFR BLD: 0.2 % (ref 0–0.4)
KETONES UR STRIP-SCNC: NEGATIVE MG/DL
LEUKOCYTE ESTERASE UR QL STRIP: NEGATIVE
LYMPHOCYTES # BLD AUTO: 3.21 K/UL (ref 1–4.8)
LYMPHOCYTES NFR BLD AUTO: 29.2 % (ref 27–41)
MCH RBC QN AUTO: 27.8 PG (ref 27–31)
MCHC RBC AUTO-ENTMCNC: 32.6 G/DL (ref 32–36)
MCV RBC AUTO: 85.2 FL (ref 80–96)
MONOCYTES # BLD AUTO: 0.74 K/UL (ref 0–0.8)
MONOCYTES NFR BLD AUTO: 6.7 % (ref 2–6)
MPC BLD CALC-MCNC: 9.3 FL (ref 9.4–12.4)
NEUTROPHILS # BLD AUTO: 6.89 K/UL (ref 1.8–7.7)
NEUTROPHILS NFR BLD AUTO: 62.7 % (ref 53–65)
NITRITE UR QL STRIP: NEGATIVE
NRBC # BLD AUTO: 0 X10E3/UL
NRBC, AUTO (.00): 0 %
PH UR STRIP: 6.5 PH UNITS
PLATELET # BLD AUTO: 395 K/UL (ref 150–400)
POTASSIUM SERPL-SCNC: 4 MMOL/L (ref 3.5–5.1)
PROT UR QL STRIP: NEGATIVE
RBC # BLD AUTO: 4.25 M/UL (ref 4.2–5.4)
RBC # UR STRIP: NEGATIVE /UL
SODIUM SERPL-SCNC: 138 MMOL/L (ref 136–145)
SP GR UR STRIP: 1.02
UROBILINOGEN UR STRIP-ACNC: 0.2 MG/DL
WBC # BLD AUTO: 10.99 K/UL (ref 4.5–11)

## 2022-03-12 PROCEDURE — 36415 COLL VENOUS BLD VENIPUNCTURE: CPT | Performed by: NURSE PRACTITIONER

## 2022-03-12 PROCEDURE — 99282 EMERGENCY DEPT VISIT SF MDM: CPT | Mod: ,,, | Performed by: NURSE PRACTITIONER

## 2022-03-12 PROCEDURE — 85025 COMPLETE CBC W/AUTO DIFF WBC: CPT | Performed by: NURSE PRACTITIONER

## 2022-03-12 PROCEDURE — 99284 EMERGENCY DEPT VISIT MOD MDM: CPT

## 2022-03-12 PROCEDURE — 80048 BASIC METABOLIC PNL TOTAL CA: CPT | Performed by: NURSE PRACTITIONER

## 2022-03-12 PROCEDURE — 99282 PR EMERGENCY DEPT VISIT,LEVEL II: ICD-10-PCS | Mod: ,,, | Performed by: NURSE PRACTITIONER

## 2022-03-12 PROCEDURE — 81003 URINALYSIS AUTO W/O SCOPE: CPT | Performed by: NURSE PRACTITIONER

## 2022-03-12 NOTE — ED PROVIDER NOTES
Encounter Date: 3/12/2022       History     Chief Complaint   Patient presents with    Abdominal Pain     26-year-old female presents ambulatory to the emergency department complaint of pain just to the left of her umbilicus.  She underwent laparoscopic cholecystectomy on 03/09/2022.  States she has had the discomfort since the surgery.  States she is not deep in feels like it is on the surface.  There is a pulling sensation when she moves.  Tender to touch.  Reports normal bowel patterns.  Denies dysuria.  Denies fever, chills or any sign or symptom of infection.  Denies wound drainage.        Review of patient's allergies indicates:   Allergen Reactions    Adhesive      Adhesive on hormone patch    Adhesive tape-silicones     Insect venom      Bee sting    Venom-honey bee      Past Medical History:   Diagnosis Date    Cardiac murmur     Endometriosis     Migraine headache 11/13/2019    PCOS (polycystic ovarian syndrome)     Periumbilical hernia 08/20/2020    repaired by Dr. Dwyer on 10/07/2020    Retroflexion of uterus 07/31/2018    3rd degree     Past Surgical History:   Procedure Laterality Date    Cyst removed from cervix      Cyst removed from ovary      DILATION AND CURETTAGE OF UTERUS      HERNIA REPAIR      HYSTERECTOMY      LAPAROSCOPIC CHOLECYSTECTOMY N/A 3/9/2022    Procedure: CHOLECYSTECTOMY, LAPAROSCOPIC;  Surgeon: Denny Dwyer MD;  Location: [facility];  Service: General;  Laterality: N/A;    LOOP ELECTROSURGICAL EXCISION PROCEDURE (LEEP)      Operative Laparoscopy with lysis of adhesions      RH/BSO with limited cystoscopy  09/24/2018     Family History   Problem Relation Age of Onset    Breast cancer Other     Diabetes Other      Social History     Tobacco Use    Smoking status: Never Smoker    Smokeless tobacco: Never Used   Substance Use Topics    Alcohol use: Never    Drug use: Never     Review of Systems   Constitutional: Negative for activity change, appetite change  and fever.   Respiratory: Negative for cough, chest tightness and shortness of breath.    Cardiovascular: Negative for chest pain and leg swelling.   Gastrointestinal: Positive for abdominal pain. Negative for abdominal distention, constipation, diarrhea, nausea and vomiting.        The just left of the umbilicus   Genitourinary: Negative for decreased urine volume, difficulty urinating, dysuria, flank pain, frequency and urgency.       Physical Exam     Initial Vitals [03/12/22 1448]   BP Pulse Resp Temp SpO2   130/76 73 18 98.7 °F (37.1 °C) 99 %      MAP       --         Physical Exam    Constitutional: She appears well-developed and well-nourished.   HENT:   Head: Normocephalic and atraumatic.   Neck: No JVD present.   Cardiovascular: Normal rate, regular rhythm, normal heart sounds and intact distal pulses.   Pulmonary/Chest: Breath sounds normal.   Abdominal: Abdomen is soft. Bowel sounds are normal. She exhibits no distension and no mass. There is abdominal tenderness.   Tenderness to light touch just left of the umbilicus There is guarding. There is no rebound.     Neurological: She is alert and oriented to person, place, and time. GCS score is 15. GCS eye subscore is 4. GCS verbal subscore is 5. GCS motor subscore is 6.   Skin: Skin is warm and dry. Capillary refill takes less than 2 seconds.   Psychiatric: She has a normal mood and affect. Her behavior is normal. Thought content normal.         Medical Screening Exam   See Full Note    ED Course   Procedures  Labs Reviewed   CBC WITH DIFFERENTIAL - Abnormal; Notable for the following components:       Result Value    Hemoglobin 11.8 (*)     Hematocrit 36.2 (*)     RDW 11.4 (*)     MPV 9.3 (*)     Monocytes % 6.7 (*)     Eosinophils % 0.7 (*)     All other components within normal limits   BASIC METABOLIC PANEL - Normal   URINALYSIS, REFLEX TO URINE CULTURE - Normal   CBC W/ AUTO DIFFERENTIAL    Narrative:     The following orders were created for panel  order CBC auto differential.  Procedure                               Abnormality         Status                     ---------                               -----------         ------                     CBC with Differential[[id number]]        Abnormal            Final result                 Please view results for these tests on the individual orders.          Imaging Results          X-Ray Abdomen Flat And Erect (Final result)  Result time 03/12/22 15:43:24    Final result by Yair Snyder MD (03/12/22 15:43:24)                 Impression:      Moderate to large amount of stool in the colon may reflect constipation    Otherwise no acute process      Electronically signed by: Yair Snyder  Date:    03/12/2022  Time:    15:43             Narrative:    EXAMINATION:  XR ABDOMEN FLAT AND ERECT    CLINICAL HISTORY:  .  Unspecified abdominal pain    COMPARISON:  No previous similar    TECHNIQUE:  AP supine abdomen    FINDINGS:  Bowel gas pattern is nonobstructive.  There is moderate to large retained stool in the colon.  There is no gross mass lesion.  There is no radiopaque calculus.  Surgical clips overlie the right upper abdomen.    Osseous structures are similar                                 Medications - No data to display                    Clinical Impression:   Final diagnoses:  [R10.9] Abdominal pain  [K59.00] Constipation, unspecified constipation type (Primary)          ED Disposition Condition    Discharge Stable        ED Prescriptions     None        Follow-up Information     Follow up With Specialties Details Why Contact Info    Dr Dwyer  Call in 2 days As needed            JOANA Nicolas  03/12/22 1050

## 2022-03-12 NOTE — Clinical Note
tolu aparicio accompanied their caregiver to the emergency department on 3/12/2022. They may return to work on 03/13/2022.      If you have any questions or concerns, please don't hesitate to call.      nazanincellor TERESA

## 2022-03-12 NOTE — ED TRIAGE NOTES
Patient presents with abdominal pain that is located on the left side. Patient had gallbladder removed on Wednesday by Dr. Dwyer.

## 2022-03-12 NOTE — Clinical Note
"Yeny Moreno" Dave was seen and treated in our emergency department on 3/12/2022.  She may return to work on 03/13/2022.       If you have any questions or concerns, please don't hesitate to call.      morris MOORE    "

## 2022-03-12 NOTE — DISCHARGE INSTRUCTIONS
Laxative of choice.  Increase oral fluids preferably water to at least 8 8 oz glasses daily over 24 hour period.  Increased dietary fiber and bulk with whole grains, fresh fruits in fresh vegetables.  If symptoms persist call your surgeons office on 03/14/2022 otherwise keep her follow-up appointment as scheduled with him.  May return emergency department as needed.

## 2022-03-25 ENCOUNTER — OFFICE VISIT (OUTPATIENT)
Dept: SURGERY | Facility: CLINIC | Age: 27
End: 2022-03-25
Attending: SURGERY
Payer: COMMERCIAL

## 2022-03-25 VITALS — WEIGHT: 225 LBS | HEIGHT: 68 IN | BODY MASS INDEX: 34.1 KG/M2

## 2022-03-25 DIAGNOSIS — Z09 POSTOP CHECK: Primary | ICD-10-CM

## 2022-03-25 PROCEDURE — 1159F PR MEDICATION LIST DOCUMENTED IN MEDICAL RECORD: ICD-10-PCS | Mod: ,,, | Performed by: SURGERY

## 2022-03-25 PROCEDURE — 99024 POSTOP FOLLOW-UP VISIT: CPT | Mod: ,,, | Performed by: SURGERY

## 2022-03-25 PROCEDURE — 99024 PR POST-OP FOLLOW-UP VISIT: ICD-10-PCS | Mod: ,,, | Performed by: SURGERY

## 2022-03-25 PROCEDURE — 3008F BODY MASS INDEX DOCD: CPT | Mod: ,,, | Performed by: SURGERY

## 2022-03-25 PROCEDURE — 99214 OFFICE O/P EST MOD 30 MIN: CPT | Mod: PBBFAC | Performed by: SURGERY

## 2022-03-25 PROCEDURE — 1159F MED LIST DOCD IN RCRD: CPT | Mod: ,,, | Performed by: SURGERY

## 2022-03-25 PROCEDURE — 3008F PR BODY MASS INDEX (BMI) DOCUMENTED: ICD-10-PCS | Mod: ,,, | Performed by: SURGERY

## 2022-03-25 NOTE — PROGRESS NOTES
Patient returns for postop visit after Post-op Evaluation (Lap gretchen and has JUAN drain)      Patient reports doing well, but has pain lateral to umbilical incision (left).  She also reports reflux symptoms.   Patient denies problems with bowel movements.    On exam, incisions have healed nicely.  There is no abdominal tenderness.      Assessment:  Doing well postop     Plan:   Begin taking prilosec.     follow up in two weeks. if still having problems.

## 2022-06-03 ENCOUNTER — TELEPHONE (OUTPATIENT)
Dept: SURGERY | Facility: CLINIC | Age: 27
End: 2022-06-03
Payer: COMMERCIAL

## 2022-06-03 NOTE — TELEPHONE ENCOUNTER
Patient called  requesting a refill on omeprazole. Dr Dwyer advised patient to follow up with GI or PCP for refill on rx.

## 2022-06-13 ENCOUNTER — OFFICE VISIT (OUTPATIENT)
Dept: FAMILY MEDICINE | Facility: CLINIC | Age: 27
End: 2022-06-13
Payer: COMMERCIAL

## 2022-06-13 VITALS
HEART RATE: 70 BPM | SYSTOLIC BLOOD PRESSURE: 110 MMHG | HEIGHT: 68 IN | TEMPERATURE: 98 F | DIASTOLIC BLOOD PRESSURE: 76 MMHG | BODY MASS INDEX: 34.28 KG/M2 | OXYGEN SATURATION: 99 % | WEIGHT: 226.19 LBS | RESPIRATION RATE: 18 BRPM

## 2022-06-13 DIAGNOSIS — Z13.220 SCREENING FOR LIPOID DISORDERS: ICD-10-CM

## 2022-06-13 DIAGNOSIS — Z13.1 SCREENING FOR DIABETES MELLITUS: ICD-10-CM

## 2022-06-13 DIAGNOSIS — K21.9 GASTROESOPHAGEAL REFLUX DISEASE WITHOUT ESOPHAGITIS: ICD-10-CM

## 2022-06-13 DIAGNOSIS — E53.8 VITAMIN B12 DEFICIENCY: ICD-10-CM

## 2022-06-13 DIAGNOSIS — F41.9 ANXIETY: ICD-10-CM

## 2022-06-13 DIAGNOSIS — Z00.00 ROUTINE MEDICAL EXAM: Primary | ICD-10-CM

## 2022-06-13 DIAGNOSIS — Z11.59 ENCOUNTER FOR HEPATITIS C SCREENING TEST FOR LOW RISK PATIENT: ICD-10-CM

## 2022-06-13 LAB
CHOLEST SERPL-MCNC: 176 MG/DL (ref 0–200)
CHOLEST/HDLC SERPL: 3.8 {RATIO}
GLUCOSE SERPL-MCNC: 63 MG/DL (ref 74–106)
HDLC SERPL-MCNC: 46 MG/DL (ref 40–60)
LDLC SERPL CALC-MCNC: 109 MG/DL
LDLC/HDLC SERPL: 2.4 {RATIO}
NONHDLC SERPL-MCNC: 130 MG/DL
TRIGL SERPL-MCNC: 104 MG/DL (ref 35–150)
VLDLC SERPL-MCNC: 21 MG/DL

## 2022-06-13 PROCEDURE — 3078F DIAST BP <80 MM HG: CPT | Mod: ,,, | Performed by: NURSE PRACTITIONER

## 2022-06-13 PROCEDURE — 1159F MED LIST DOCD IN RCRD: CPT | Mod: ,,, | Performed by: NURSE PRACTITIONER

## 2022-06-13 PROCEDURE — 80061 LIPID PANEL: CPT | Mod: ,,, | Performed by: CLINICAL MEDICAL LABORATORY

## 2022-06-13 PROCEDURE — 1160F PR REVIEW ALL MEDS BY PRESCRIBER/CLIN PHARMACIST DOCUMENTED: ICD-10-PCS | Mod: ,,, | Performed by: NURSE PRACTITIONER

## 2022-06-13 PROCEDURE — 1159F PR MEDICATION LIST DOCUMENTED IN MEDICAL RECORD: ICD-10-PCS | Mod: ,,, | Performed by: NURSE PRACTITIONER

## 2022-06-13 PROCEDURE — 82947 ASSAY GLUCOSE BLOOD QUANT: CPT | Mod: ,,, | Performed by: CLINICAL MEDICAL LABORATORY

## 2022-06-13 PROCEDURE — 1160F RVW MEDS BY RX/DR IN RCRD: CPT | Mod: ,,, | Performed by: NURSE PRACTITIONER

## 2022-06-13 PROCEDURE — 3074F SYST BP LT 130 MM HG: CPT | Mod: ,,, | Performed by: NURSE PRACTITIONER

## 2022-06-13 PROCEDURE — 82947 GLUCOSE, FASTING: ICD-10-PCS | Mod: ,,, | Performed by: CLINICAL MEDICAL LABORATORY

## 2022-06-13 PROCEDURE — 86803 HEPATITIS C ANTIBODY: ICD-10-PCS | Mod: ,,, | Performed by: CLINICAL MEDICAL LABORATORY

## 2022-06-13 PROCEDURE — 3008F BODY MASS INDEX DOCD: CPT | Mod: ,,, | Performed by: NURSE PRACTITIONER

## 2022-06-13 PROCEDURE — 86803 HEPATITIS C AB TEST: CPT | Mod: ,,, | Performed by: CLINICAL MEDICAL LABORATORY

## 2022-06-13 PROCEDURE — 99395 PR PREVENTIVE VISIT,EST,18-39: ICD-10-PCS | Mod: ,,, | Performed by: NURSE PRACTITIONER

## 2022-06-13 PROCEDURE — 3008F PR BODY MASS INDEX (BMI) DOCUMENTED: ICD-10-PCS | Mod: ,,, | Performed by: NURSE PRACTITIONER

## 2022-06-13 PROCEDURE — 3074F PR MOST RECENT SYSTOLIC BLOOD PRESSURE < 130 MM HG: ICD-10-PCS | Mod: ,,, | Performed by: NURSE PRACTITIONER

## 2022-06-13 PROCEDURE — 99395 PREV VISIT EST AGE 18-39: CPT | Mod: ,,, | Performed by: NURSE PRACTITIONER

## 2022-06-13 PROCEDURE — 3078F PR MOST RECENT DIASTOLIC BLOOD PRESSURE < 80 MM HG: ICD-10-PCS | Mod: ,,, | Performed by: NURSE PRACTITIONER

## 2022-06-13 PROCEDURE — 80061 LIPID PANEL: ICD-10-PCS | Mod: ,,, | Performed by: CLINICAL MEDICAL LABORATORY

## 2022-06-13 RX ORDER — PROCHLORPERAZINE MALEATE 10 MG
10 TABLET ORAL 2 TIMES DAILY PRN
COMMUNITY
Start: 2022-05-26 | End: 2023-07-18

## 2022-06-13 RX ORDER — RIMEGEPANT SULFATE 75 MG/75MG
75 TABLET, ORALLY DISINTEGRATING ORAL ONCE AS NEEDED
COMMUNITY
Start: 2022-05-24 | End: 2023-07-18

## 2022-06-13 RX ORDER — OMEPRAZOLE 20 MG/1
20 CAPSULE, DELAYED RELEASE ORAL DAILY
Qty: 30 CAPSULE | Refills: 11 | Status: SHIPPED | OUTPATIENT
Start: 2022-06-13 | End: 2022-09-28

## 2022-06-13 RX ORDER — CYANOCOBALAMIN 1000 UG/ML
INJECTION, SOLUTION INTRAMUSCULAR; SUBCUTANEOUS
Qty: 1 ML | Refills: 5 | Status: SHIPPED | OUTPATIENT
Start: 2022-06-13 | End: 2022-09-28 | Stop reason: SDUPTHER

## 2022-06-13 RX ORDER — HYDROXYZINE PAMOATE 25 MG/1
25 CAPSULE ORAL EVERY 8 HOURS PRN
Qty: 90 CAPSULE | Refills: 0 | Status: SHIPPED | OUTPATIENT
Start: 2022-06-13 | End: 2022-09-28

## 2022-06-13 RX ORDER — TIZANIDINE 4 MG/1
TABLET ORAL
COMMUNITY
Start: 2022-05-19

## 2022-06-13 NOTE — PROGRESS NOTES
JOANA Domínguez   [facility]  [address]  [place] [address]     PATIENT NAME: Yeny Acosta  : 1995  DATE: 22  MRN: [id number]      Billing Provider: JOANA Domínguez  Level of Service: [place] PREVENTIVE VISIT,EST,18-39  Patient PCP Information     Provider PCP Type    Guille Parisi MD General          Reason for Visit / Chief Complaint: Annual Exam ([facility] visit.)       Update PCP  Update Chief Complaint         History of Present Illness / Problem Focused Workflow     Yeny Acosta presents to the [facility] for healthy [facility]    Review of Systems     Review of Systems   Constitutional: Negative.  Negative for activity change, appetite change, fatigue and unexpected weight change.   Eyes: Negative for visual disturbance.   Respiratory: Negative for cough, chest tightness and shortness of breath.    Cardiovascular: Negative for chest pain and palpitations.   Gastrointestinal: Negative for abdominal pain, nausea and vomiting.   Endocrine: Negative for cold intolerance, heat intolerance, polydipsia, polyphagia and polyuria.   Neurological: Negative for dizziness, weakness and headaches.        Medical / Social / Family History     Past Medical History:   Diagnosis Date    Cardiac murmur     Endometriosis     Migraine headache 2019    PCOS (polycystic ovarian syndrome)     Periumbilical hernia 2020    repaired by Dr. Dwyer on 10/07/2020    Retroflexion of uterus 2018    3rd degree       Past Surgical History:   Procedure Laterality Date    Cyst removed from cervix      Cyst removed from ovary      DILATION AND CURETTAGE OF UTERUS      HERNIA REPAIR      HYSTERECTOMY      LAPAROSCOPIC CHOLECYSTECTOMY N/A 3/9/2022    Procedure: CHOLECYSTECTOMY, LAPAROSCOPIC;  Surgeon: Denny Dwyer MD;  Location: [facility];  Service: General;  Laterality: N/A;    LOOP ELECTROSURGICAL EXCISION PROCEDURE (LEEP)      Operative Laparoscopy with  "lysis of adhesions      RH/BSO with limited cystoscopy  09/24/2018       Social History  Ms.  reports that she has never smoked. She has never used smokeless tobacco. She reports that she does not drink alcohol and does not use drugs.    Family History  Ms.'s family history includes Breast cancer in her other; Diabetes in her other.    Medications and Allergies     Medications  Outpatient Medications Marked as Taking for the 6/13/22 encounter (Office Visit) with JOANA Schroeder   Medication Sig Dispense Refill    cetirizine (ZYRTEC) 10 MG tablet cetirizine 10 mg tablet   TAKE 1 TABLET BY MOUTH ONCE DAILY      ergocalciferol (ERGOCALCIFEROL) 50,000 unit Cap Take 50,000 Units by mouth twice a week.      estradioL (ESTRACE) 2 MG tablet Take 2 mg by mouth once daily.      fluticasone-salmeterol diskus inhaler 250-50 mcg fluticasone 250 mcg-salmeterol 50 mcg/dose blistr powdr for inhalation   INHALE 1 DOSE BY MOUTH TWICE DAILY      galcanezumab-gnlm (EMGALITY PEN) 120 mg/mL PnIj Emgality Pen 120 mg/mL subcutaneous pen injector   INJECT 1 ONCE EVERY MONTH      montelukast (SINGULAIR) 10 mg tablet montelukast 10 mg tablet   TAKE 1 TABLET BY MOUTH DAILY AT BEDTIME      [DISCONTINUED] cyanocobalamin 1,000 mcg/mL injection cyanocobalamin (vit B-12) 1,000 mcg/mL injection solution   INJECT 1 ML INTRAMUSCULARLY MONTHLY         Allergies  Review of patient's allergies indicates:   Allergen Reactions    Adhesive      Adhesive on hormone patch    Adhesive tape-silicones     Insect venom      Bee sting    Venom-honey bee        Physical Examination     Vitals:    06/13/22 1002   BP: 110/76   BP Location: Right arm   Patient Position: Sitting   BP Method: Large (Manual)   Pulse: 70   Resp: 18   Temp: 98.1 °F (36.7 °C)   TempSrc: Oral   SpO2: 99%   Weight: 102.6 kg (226 lb 3.2 oz)   Height: 5' 8" (1.727 m)      Physical Exam  Constitutional:       General: She is not in acute distress.     Appearance: Normal " appearance.   Neck:      Thyroid: No thyromegaly.      Vascular: No carotid bruit.   Cardiovascular:      Rate and Rhythm: Normal rate and regular rhythm.      Pulses:           Carotid pulses are 3+ on the right side and 3+ on the left side.     Heart sounds: Normal heart sounds. No murmur heard.  Pulmonary:      Effort: Pulmonary effort is normal. No accessory muscle usage or respiratory distress.      Breath sounds: Normal breath sounds. No wheezing, rhonchi or rales.   Abdominal:      General: Bowel sounds are normal.      Palpations: Abdomen is soft.      Tenderness: There is no abdominal tenderness.   Musculoskeletal:         General: Normal range of motion.      Cervical back: Neck supple.      Right lower leg: No edema.      Left lower leg: No edema.   Skin:     General: Skin is warm and dry.      Coloration: Skin is not jaundiced or pale.   Neurological:      Mental Status: She is alert and oriented to person, place, and time.      Gait: Gait is intact.          Assessment and Plan (including Health Maintenance)      Problem List  Smart Sets  Document Outside HM   :        Health Maintenance Due   Topic Date Due    Hepatitis C Screening  Never done    COVID-19 Vaccine (1) Never done    HPV Vaccines (1 - 2-dose series) Never done    TETANUS VACCINE  Never done       Problem List Items Addressed This Visit    None     Visit Diagnoses     Routine medical exam    -  Primary    Will check routine labs and continue current medications.    Screening for lipoid disorders        LDL goal < 70    Relevant Orders    Lipid Panel    Screening for diabetes mellitus        Relevant Orders    Glucose, Fasting    Encounter for hepatitis C screening test for low risk patient        Relevant Orders    Hepatitis C Antibody    Gastroesophageal reflux disease without esophagitis        Relevant Medications    omeprazole (PRILOSEC) 20 MG capsule    Anxiety        Relevant Medications    hydrOXYzine pamoate (VISTARIL) 25 MG  Cap    Vitamin B12 deficiency        Relevant Medications    cyanocobalamin 1,000 mcg/mL injection        Routine medical exam  Comments:  Will check routine labs and continue current medications.    Screening for lipoid disorders  Comments:  LDL goal < 70  Orders:  -     Lipid Panel; Future; Expected date: 06/13/2022    Screening for diabetes mellitus  -     Glucose, Fasting; Future; Expected date: 06/13/2022    Encounter for hepatitis C screening test for low risk patient  -     Hepatitis C Antibody; Future; Expected date: 06/13/2022    Gastroesophageal reflux disease without esophagitis  -     omeprazole (PRILOSEC) 20 MG capsule; Take 1 capsule (20 mg total) by mouth once daily.  Dispense: 30 capsule; Refill: 11    Anxiety  -     hydrOXYzine pamoate (VISTARIL) 25 MG Cap; Take 1 capsule (25 mg total) by mouth every 8 (eight) hours as needed (anxiety).  Dispense: 90 capsule; Refill: 0    Vitamin B12 deficiency  -     cyanocobalamin 1,000 mcg/mL injection; cyanocobalamin (vit B-12) 1,000 mcg/mL injection solution  INJECT 1 ML INTRAMUSCULARLY MONTHLY  Dispense: 1 mL; Refill: 5       The patient has no Health Maintenance topics of status Not Due    Procedures     Future Appointments   Date Time Provider Department Center   6/14/2023  9:30 AM JOANA Schroeder [facility]        Follow up in about 6 months (around 12/13/2022), or if symptoms worsen or fail to improve.     Signature:  JOANA Domínguez    Date of encounter: 6/13/22

## 2022-06-14 ENCOUNTER — PATIENT MESSAGE (OUTPATIENT)
Dept: FAMILY MEDICINE | Facility: CLINIC | Age: 27
End: 2022-06-14
Payer: COMMERCIAL

## 2022-06-14 LAB — HCV AB SER QL: NORMAL

## 2022-06-20 RX ORDER — TRIAMCINOLONE ACETONIDE 1 MG/G
OINTMENT TOPICAL
Qty: 454 G | Refills: 0 | Status: SHIPPED | OUTPATIENT
Start: 2022-06-20

## 2022-08-11 ENCOUNTER — HOSPITAL ENCOUNTER (EMERGENCY)
Facility: HOSPITAL | Age: 27
Discharge: HOME | End: 2022-08-11
Attending: EMERGENCY MEDICINE
Payer: COMMERCIAL

## 2022-08-11 VITALS
WEIGHT: 230.5 LBS | SYSTOLIC BLOOD PRESSURE: 130 MMHG | OXYGEN SATURATION: 98 % | TEMPERATURE: 98 F | RESPIRATION RATE: 18 BRPM | HEART RATE: 61 BPM | HEIGHT: 68 IN | DIASTOLIC BLOOD PRESSURE: 77 MMHG | BODY MASS INDEX: 34.93 KG/M2

## 2022-08-11 DIAGNOSIS — G43.911 INTRACTABLE MIGRAINE WITH STATUS MIGRAINOSUS, UNSPECIFIED MIGRAINE TYPE: Primary | ICD-10-CM

## 2022-08-11 DIAGNOSIS — G43.909 MIGRAINE WITHOUT STATUS MIGRAINOSUS, NOT INTRACTABLE, UNSPECIFIED MIGRAINE TYPE: ICD-10-CM

## 2022-08-11 PROCEDURE — 99285 EMERGENCY DEPT VISIT HI MDM: CPT | Mod: 25

## 2022-08-11 PROCEDURE — 99284 PR EMERGENCY DEPT VISIT,LEVEL IV: ICD-10-PCS | Mod: ,,, | Performed by: EMERGENCY MEDICINE

## 2022-08-11 PROCEDURE — 96372 THER/PROPH/DIAG INJ SC/IM: CPT

## 2022-08-11 PROCEDURE — 63600175 PHARM REV CODE 636 W HCPCS: Performed by: EMERGENCY MEDICINE

## 2022-08-11 PROCEDURE — 99284 EMERGENCY DEPT VISIT MOD MDM: CPT | Mod: ,,, | Performed by: EMERGENCY MEDICINE

## 2022-08-11 RX ORDER — PROMETHAZINE HYDROCHLORIDE 25 MG/ML
25 INJECTION, SOLUTION INTRAMUSCULAR; INTRAVENOUS
Status: COMPLETED | OUTPATIENT
Start: 2022-08-11 | End: 2022-08-11

## 2022-08-11 RX ORDER — MEPERIDINE HYDROCHLORIDE 25 MG/ML
50 INJECTION INTRAMUSCULAR; INTRAVENOUS; SUBCUTANEOUS
Status: COMPLETED | OUTPATIENT
Start: 2022-08-11 | End: 2022-08-11

## 2022-08-11 RX ADMIN — PROMETHAZINE HYDROCHLORIDE 25 MG: 25 INJECTION INTRAMUSCULAR; INTRAVENOUS at 07:08

## 2022-08-11 RX ADMIN — MEPERIDINE HYDROCHLORIDE 50 MG: 25 INJECTION INTRAMUSCULAR; INTRAVENOUS; SUBCUTANEOUS at 07:08

## 2022-08-11 NOTE — Clinical Note
"Yeny "Panchito Acosta was seen and treated in our emergency department on 8/11/2022.  She may return to work on 08/12/2022.       If you have any questions or concerns, please don't hesitate to call.      Ariadna Packer, DO"

## 2022-08-11 NOTE — DISCHARGE INSTRUCTIONS
Return to ED if symptoms worsen. Continue Qulipta for prophylaxis and home migraine medications as needed. Followup with headache center appointment.

## 2022-08-11 NOTE — ED PROVIDER NOTES
Encounter Date: 8/11/2022       History     Chief Complaint   Patient presents with    Migraine     Yeny Acosta is a 27yo AA female with a PMH of migraine headache and Chiari malformation who presents to the ED with migraine. Patient sees Christine Schulte NP at a headache center in [place] for her migraines and has been seeing him since 2-3 years ago. They did a CT and MRI on patient in around 2019 and found that she has Chiari malformation. Patient last saw headache center NP about 2 weeks ago, and was told that if her migraine gets severe with neck pain and pressure points she should present to the ED and get a CT or MRI done. Patient started having migraine episodes 7 days ago and symptoms were worse yesterday. She reports that she almost felt like passing out from the headache at work but denies actual falling or hitting head. Patient was told to try Diclofenac, Nurtec, and triptans for her migraines. Patient took Nurtec and triptans at work yesterday and Diclofenac and Compazine at night without relief. Patient also has been taking Qulipta for migraine prophylaxis daily. Patient reports that she gets stomach upset and burning sensation if she takes Phenergan PO for nausea. She takes Omeprazole and estradiol for GERD and hysterectomy.      Review of patient's allergies indicates:   Allergen Reactions    Adhesive      Adhesive on hormone patch    Adhesive tape-silicones     Insect venom      Bee sting    Venom-honey bee      Past Medical History:   Diagnosis Date    Cardiac murmur     Endometriosis     Migraine headache 11/13/2019    PCOS (polycystic ovarian syndrome)     Periumbilical hernia 08/20/2020    repaired by Dr. Dwyer on 10/07/2020    Retroflexion of uterus 07/31/2018    3rd degree     Past Surgical History:   Procedure Laterality Date    Cyst removed from cervix      Cyst removed from ovary      DILATION AND CURETTAGE OF UTERUS      HERNIA REPAIR      HYSTERECTOMY      LAPAROSCOPIC CHOLECYSTECTOMY  N/A 3/9/2022    Procedure: CHOLECYSTECTOMY, LAPAROSCOPIC;  Surgeon: Denny Dwyer MD;  [facility]: [facility];  Service: General;  Laterality: N/A;    LOOP ELECTROSURGICAL EXCISION PROCEDURE (LEEP)      Operative Laparoscopy with lysis of adhesions      RH/BSO with limited cystoscopy  09/24/2018     Family History   Problem Relation Age of Onset    Breast cancer Other     Diabetes Other      Social History     Tobacco Use    Smoking status: Never    Smokeless tobacco: Never   Substance Use Topics    Alcohol use: Never    Drug use: Never     Review of Systems   Constitutional:  Negative for chills, diaphoresis and fever.   HENT:  Positive for tinnitus. Negative for drooling, facial swelling and sneezing.         Sensitive to noise   Eyes:  Positive for photophobia. Negative for itching.   Respiratory:  Negative for cough and shortness of breath.    Cardiovascular:  Negative for chest pain.   Gastrointestinal:  Positive for nausea. Negative for vomiting.   Genitourinary:  Negative for difficulty urinating.   Musculoskeletal:  Positive for neck pain.   Neurological:  Positive for dizziness and headaches. Negative for facial asymmetry and speech difficulty.   Psychiatric/Behavioral:  Negative for agitation and behavioral problems.      Physical Exam     Initial Vitals   BP Pulse Resp Temp SpO2   08/11/22 0712 08/11/22 0712 08/11/22 0712 08/11/22 0714 08/11/22 0712   130/77 61 18 98 °F (36.7 °C) 98 %      MAP       --                Physical Exam    Nursing note and vitals reviewed.  Constitutional: She appears well-developed and well-nourished. She is not diaphoretic. She appears distressed.   HENT:   Head: Normocephalic and atraumatic.   Eyes: Conjunctivae are normal. Pupils are equal, round, and reactive to light. Right eye exhibits no discharge. Left eye exhibits no discharge.   Neck: Neck supple.   Cardiovascular:  Normal rate and regular rhythm.           Pulmonary/Chest: Breath sounds normal. No respiratory  distress. She has no wheezes.   Abdominal: Abdomen is soft.   Musculoskeletal:      Cervical back: Neck supple.     Neurological: She is alert and oriented to person, place, and time.   Skin: Skin is warm.   Psychiatric: She has a normal mood and affect. Her behavior is normal.       Medical Screening Exam   See Full Note    ED Course   Procedures  Labs Reviewed - No data to display       Imaging Results              CT Head Without Contrast (Final result)  Result time 08/11/22 08:11:37      Final result by Mj Shay DO (08/11/22 08:11:37)                   Impression:      No convincing imaging evidence of acute intracranial abnormality.    The CT exam was performed using one or more of the following dose    reduction techniques- Automated exposure control, adjustment of the mA    and/or kV according to patient size, and/or use of iterative    reconstructed technique.    Point of Service: [facility]      Electronically signed by: Mj Shay  Date:    08/11/2022  Time:    08:11               Narrative:    EXAMINATION:  CT HEAD WITHOUT CONTRAST    CLINICAL HISTORY:  Migraine, unspecified, not intractable, without status migrainosusHeadache, classic migraine;    COMPARISON:  CT brain June 2, 2020    TECHNIQUE:  Multiple axial tomographic images of the brain were obtained without the use of intravenous contrast.    FINDINGS:  Midline structures are nondisplaced.  No convincing evidence of acute intracranial hemorrhage.  No convincing evidence of hydrocephalus.  Visualized paranasal sinuses and mastoid air cells are predominantly clear.                                       Medications   meperidine (PF) injection 50 mg (50 mg Intramuscular Given 8/11/22 0752)   promethazine injection 25 mg (25 mg Intramuscular Given 8/11/22 0752)                Attending Attestation:   Physician Attestation Statement for Resident:  As the supervising MD   Physician Attestation Statement: I have personally seen and  examined this patient.   I agree with the above history.  -:   As the supervising MD I agree with the above PE.     As the supervising MD I agree with the above treatment, course, plan, and disposition.                        Clinical Impression:   Final diagnoses:  [G43.909] Migraine without status migrainosus, not intractable, unspecified migraine type  [G43.911] Intractable migraine with status migrainosus, unspecified migraine type (Primary)        ED Disposition Condition    Discharge Stable          ED Prescriptions    None       Follow-up Information    None          Ariadna Packer DO  Resident  08/11/22 0875       Giorgi Zhang MD  08/28/22 1602       Giorgi Zhang MD  08/28/22 0346

## 2022-08-11 NOTE — ED TRIAGE NOTES
PRESENTS TO ER WITH COMPLAINT OF MIGRAINE. HAS A HX OF MIGRAINES TAKING MONTHLY INJECTION, DAILY PRESCRIPTION AND THEN BREAK THRU PAIN MED

## 2022-09-28 ENCOUNTER — OFFICE VISIT (OUTPATIENT)
Dept: FAMILY MEDICINE | Facility: CLINIC | Age: 27
End: 2022-09-28
Payer: COMMERCIAL

## 2022-09-28 VITALS
RESPIRATION RATE: 20 BRPM | SYSTOLIC BLOOD PRESSURE: 124 MMHG | BODY MASS INDEX: 35.46 KG/M2 | HEART RATE: 57 BPM | TEMPERATURE: 99 F | OXYGEN SATURATION: 99 % | HEIGHT: 68 IN | WEIGHT: 234 LBS | DIASTOLIC BLOOD PRESSURE: 84 MMHG

## 2022-09-28 DIAGNOSIS — K21.9 GASTROESOPHAGEAL REFLUX DISEASE WITHOUT ESOPHAGITIS: ICD-10-CM

## 2022-09-28 DIAGNOSIS — Z79.890 HORMONE REPLACEMENT THERAPY: ICD-10-CM

## 2022-09-28 DIAGNOSIS — G43.909 MIGRAINE WITHOUT STATUS MIGRAINOSUS, NOT INTRACTABLE, UNSPECIFIED MIGRAINE TYPE: ICD-10-CM

## 2022-09-28 DIAGNOSIS — E55.9 VITAMIN D DEFICIENCY: ICD-10-CM

## 2022-09-28 DIAGNOSIS — F41.9 ANXIETY: ICD-10-CM

## 2022-09-28 DIAGNOSIS — E53.8 VITAMIN B12 DEFICIENCY: ICD-10-CM

## 2022-09-28 DIAGNOSIS — J01.40 ACUTE NON-RECURRENT PANSINUSITIS: Primary | ICD-10-CM

## 2022-09-28 PROCEDURE — 1159F MED LIST DOCD IN RCRD: CPT | Mod: ,,, | Performed by: NURSE PRACTITIONER

## 2022-09-28 PROCEDURE — 96372 THER/PROPH/DIAG INJ SC/IM: CPT | Mod: ,,, | Performed by: NURSE PRACTITIONER

## 2022-09-28 PROCEDURE — 3079F DIAST BP 80-89 MM HG: CPT | Mod: ,,, | Performed by: NURSE PRACTITIONER

## 2022-09-28 PROCEDURE — 99213 OFFICE O/P EST LOW 20 MIN: CPT | Mod: 25,,, | Performed by: NURSE PRACTITIONER

## 2022-09-28 PROCEDURE — 99213 PR OFFICE/OUTPT VISIT, EST, LEVL III, 20-29 MIN: ICD-10-PCS | Mod: 25,,, | Performed by: NURSE PRACTITIONER

## 2022-09-28 PROCEDURE — 1159F PR MEDICATION LIST DOCUMENTED IN MEDICAL RECORD: ICD-10-PCS | Mod: ,,, | Performed by: NURSE PRACTITIONER

## 2022-09-28 PROCEDURE — 3008F PR BODY MASS INDEX (BMI) DOCUMENTED: ICD-10-PCS | Mod: ,,, | Performed by: NURSE PRACTITIONER

## 2022-09-28 PROCEDURE — 96372 PR INJECTION,THERAP/PROPH/DIAG2ST, IM OR SUBCUT: ICD-10-PCS | Mod: ,,, | Performed by: NURSE PRACTITIONER

## 2022-09-28 PROCEDURE — 3074F PR MOST RECENT SYSTOLIC BLOOD PRESSURE < 130 MM HG: ICD-10-PCS | Mod: ,,, | Performed by: NURSE PRACTITIONER

## 2022-09-28 PROCEDURE — 1160F PR REVIEW ALL MEDS BY PRESCRIBER/CLIN PHARMACIST DOCUMENTED: ICD-10-PCS | Mod: ,,, | Performed by: NURSE PRACTITIONER

## 2022-09-28 PROCEDURE — 3079F PR MOST RECENT DIASTOLIC BLOOD PRESSURE 80-89 MM HG: ICD-10-PCS | Mod: ,,, | Performed by: NURSE PRACTITIONER

## 2022-09-28 PROCEDURE — 3074F SYST BP LT 130 MM HG: CPT | Mod: ,,, | Performed by: NURSE PRACTITIONER

## 2022-09-28 PROCEDURE — 1160F RVW MEDS BY RX/DR IN RCRD: CPT | Mod: ,,, | Performed by: NURSE PRACTITIONER

## 2022-09-28 PROCEDURE — 3008F BODY MASS INDEX DOCD: CPT | Mod: ,,, | Performed by: NURSE PRACTITIONER

## 2022-09-28 RX ORDER — DICLOFENAC POTASSIUM 25 MG/1
1 CAPSULE, LIQUID FILLED ORAL 4 TIMES DAILY
COMMUNITY
Start: 2022-07-08 | End: 2023-07-18

## 2022-09-28 RX ORDER — ESTRADIOL 2 MG/1
2 TABLET ORAL 2 TIMES DAILY
Qty: 60 TABLET | Refills: 2 | Status: SHIPPED | OUTPATIENT
Start: 2022-09-28 | End: 2022-12-01 | Stop reason: SDUPTHER

## 2022-09-28 RX ORDER — ATOGEPANT 60 MG/1
TABLET ORAL
COMMUNITY
Start: 2022-08-31 | End: 2022-12-01

## 2022-09-28 RX ORDER — CYANOCOBALAMIN 1000 UG/ML
INJECTION, SOLUTION INTRAMUSCULAR; SUBCUTANEOUS
Qty: 1 ML | Refills: 5 | Status: SHIPPED | OUTPATIENT
Start: 2022-09-28 | End: 2022-12-01 | Stop reason: SDUPTHER

## 2022-09-28 RX ORDER — ERGOCALCIFEROL 1.25 MG/1
50000 CAPSULE ORAL
Qty: 4 CAPSULE | Refills: 2 | Status: SHIPPED | OUTPATIENT
Start: 2022-09-28 | End: 2022-12-01 | Stop reason: SDUPTHER

## 2022-09-28 RX ORDER — DEXAMETHASONE SODIUM PHOSPHATE 4 MG/ML
4 INJECTION, SOLUTION INTRA-ARTICULAR; INTRALESIONAL; INTRAMUSCULAR; INTRAVENOUS; SOFT TISSUE
Status: COMPLETED | OUTPATIENT
Start: 2022-09-28 | End: 2022-09-28

## 2022-09-28 RX ORDER — ONDANSETRON 8 MG/1
8 TABLET, ORALLY DISINTEGRATING ORAL 2 TIMES DAILY PRN
COMMUNITY
Start: 2022-08-23 | End: 2022-11-04 | Stop reason: SDUPTHER

## 2022-09-28 RX ORDER — OMEPRAZOLE 40 MG/1
40 CAPSULE, DELAYED RELEASE ORAL DAILY
Qty: 30 CAPSULE | Refills: 11 | Status: SHIPPED | OUTPATIENT
Start: 2022-09-28 | End: 2023-06-13 | Stop reason: SDUPTHER

## 2022-09-28 RX ORDER — AZELASTINE 1 MG/ML
2 SPRAY, METERED NASAL 2 TIMES DAILY PRN
COMMUNITY
Start: 2022-07-01 | End: 2022-09-28

## 2022-09-28 RX ORDER — IPRATROPIUM BROMIDE 42 UG/1
1-2 SPRAY, METERED NASAL 3 TIMES DAILY
COMMUNITY
Start: 2022-09-20 | End: 2022-09-28

## 2022-09-28 RX ORDER — TRAZODONE HYDROCHLORIDE 100 MG/1
100 TABLET ORAL NIGHTLY
Qty: 30 TABLET | Refills: 3 | Status: SHIPPED | OUTPATIENT
Start: 2022-09-28 | End: 2022-12-01 | Stop reason: SDUPTHER

## 2022-09-28 RX ORDER — CEFTRIAXONE 1 G/1
1 INJECTION, POWDER, FOR SOLUTION INTRAMUSCULAR; INTRAVENOUS
Status: COMPLETED | OUTPATIENT
Start: 2022-09-28 | End: 2022-09-28

## 2022-09-28 RX ORDER — SUMATRIPTAN 10 MG/1
1 SPRAY NASAL
COMMUNITY
Start: 2022-08-26 | End: 2023-01-14 | Stop reason: SDUPTHER

## 2022-09-28 RX ADMIN — DEXAMETHASONE SODIUM PHOSPHATE 4 MG: 4 INJECTION, SOLUTION INTRA-ARTICULAR; INTRALESIONAL; INTRAMUSCULAR; INTRAVENOUS; SOFT TISSUE at 03:09

## 2022-09-28 RX ADMIN — CEFTRIAXONE 1 G: 1 INJECTION, POWDER, FOR SOLUTION INTRAMUSCULAR; INTRAVENOUS at 03:09

## 2022-09-28 NOTE — PROGRESS NOTES
Valeria Melendrez NP   [facility]  [address]  [place]  [address]      PATIENT NAME: Yeny Acosta  : 1995  DATE: 22  MRN: [id number]      Billing Provider: Valeria Melendrez NP  Level of Service: NE OFFICE/OUTPT VISIT, EST, LEVL III, 20-29 MIN  Patient PCP Information       Provider PCP Type    Guille Parisi MD General            Reason for Visit / Chief Complaint: Anxiety (Pt has chronic anxiety and wants to discuss treatment options. ) and Otalgia (Right ear pain x 3 weeks. )       Update PCP  Update Chief Complaint         History of Present Illness / Problem Focused Workflow     Yeny Acosta presents to the clinic with Anxiety (Pt has chronic anxiety and wants to discuss treatment options. ) and Otalgia (Right ear pain x 3 weeks. )     26 year old female presents to clinic with complaints of right ear pain, nasal congestion, and cough. She denies fever or known sick contacts. Patient also reports chronic anxiety and insomnia and would like to start something for this. She is requesting refill on regular medications as well.       Review of Systems     @Review of Systems   Constitutional:  Negative for activity change and unexpected weight change.   HENT:  Positive for nasal congestion and ear pain. Negative for hearing loss, rhinorrhea and trouble swallowing.    Eyes:  Negative for discharge and visual disturbance.   Respiratory:  Positive for cough. Negative for chest tightness and wheezing.    Cardiovascular:  Negative for chest pain and palpitations.   Gastrointestinal:  Negative for blood in stool, constipation, diarrhea and vomiting.   Endocrine: Negative for polydipsia and polyuria.   Genitourinary:  Negative for difficulty urinating, dysuria, hematuria and menstrual problem.   Musculoskeletal:  Negative for arthralgias, joint swelling and neck pain.   Neurological:  Positive for headaches. Negative for weakness.   Psychiatric/Behavioral:  Positive for  dysphoric mood and sleep disturbance. Negative for confusion. The patient is nervous/anxious.      Medical / Social / Family History     Past Medical History:   Diagnosis Date    Cardiac murmur     Endometriosis     Migraine headache 11/13/2019    PCOS (polycystic ovarian syndrome)     Periumbilical hernia 08/20/2020    repaired by Dr. Dwyer on 10/07/2020    Retroflexion of uterus 07/31/2018    3rd degree       Past Surgical History:   Procedure Laterality Date    CHOLECYSTECTOMY  3-2022    Cyst removed from cervix      Cyst removed from ovary      DILATION AND CURETTAGE OF UTERUS      HERNIA REPAIR      HYSTERECTOMY      LAPAROSCOPIC CHOLECYSTECTOMY N/A 03/09/2022    Procedure: CHOLECYSTECTOMY, LAPAROSCOPIC;  Surgeon: Denny Dwyer MD;  Location: [facility] [place];  Service: General;  Laterality: N/A;    LOOP ELECTROSURGICAL EXCISION PROCEDURE (LEEP)      Operative Laparoscopy with lysis of adhesions      RH/BSO with limited cystoscopy  09/24/2018    TONSILLECTOMY  2018       Social History  Ms.  reports that she has never smoked. She has never used smokeless tobacco. She reports that she does not currently use alcohol. She reports that she does not use drugs.    Family History  Ms.'s family history includes Breast cancer in an other family member; Diabetes in an other family member.    Medications and Allergies     Medications  Outpatient Medications Marked as Taking for the 9/28/22 encounter (Office Visit) with Valeria Melendrez NP   Medication Sig Dispense Refill    diclofenac potassium (ZIPSOR) 25 mg Cap Take 1 capsule by mouth 4 (four) times daily.      fluticasone-salmeterol diskus inhaler 250-50 mcg fluticasone 250 mcg-salmeterol 50 mcg/dose blistr powdr for inhalation   INHALE 1 DOSE BY MOUTH TWICE DAILY      ibuprofen (ADVIL,MOTRIN) 800 MG tablet ibuprofen 800 mg tablet   TAKE 1 TABLET BY MOUTH EVERY 6 TO 8 HOURS AS NEEDED FOR PAIN      montelukast (SINGULAIR) 10 mg tablet montelukast 10 mg tablet   TAKE 1  TABLET BY MOUTH DAILY AT BEDTIME      NURTEC 75 mg odt Take 75 mg by mouth once as needed for Migraine.      ondansetron (ZOFRAN-ODT) 8 MG TbDL Take 8 mg by mouth 2 (two) times daily as needed.      prochlorperazine (COMPAZINE) 10 MG tablet Take 10 mg by mouth 2 (two) times daily as needed.      QULIPTA 60 mg Tab Take by mouth.      tiZANidine (ZANAFLEX) 4 MG tablet TAKE 1 OR 2 TABLETS BY MOUTH AT BEDTIME AS NEEDED FOR NECK PAIN / SPASM      TOSYMRA 10 mg/actuation Spry 1 spray by Each Nostril route as needed for Migraine.      triamcinolone acetonide 0.1% (KENALOG) 0.1 % ointment Apply to affected area 2 TIMES A DAY, NO LONGER THEN 1 WEEK 454 g 0    [DISCONTINUED] azelastine (ASTELIN) 137 mcg (0.1 %) nasal spray 2 sprays by Nasal route 2 (two) times daily as needed.      [DISCONTINUED] cetirizine (ZYRTEC) 10 MG tablet cetirizine 10 mg tablet   TAKE 1 TABLET BY MOUTH ONCE DAILY      [DISCONTINUED] cyanocobalamin 1,000 mcg/mL injection cyanocobalamin (vit B-12) 1,000 mcg/mL injection solution  INJECT 1 ML INTRAMUSCULARLY MONTHLY 1 mL 5    [DISCONTINUED] EPINEPHrine (EPIPEN) 0.3 mg/0.3 mL AtIn epinephrine 0.3 mg/0.3 mL injection, auto-injector   USE AS DIRECTED FOR ACUTE ALLERGIC REACTION      [DISCONTINUED] ergocalciferol (ERGOCALCIFEROL) 50,000 unit Cap Take 50,000 Units by mouth twice a week.      [DISCONTINUED] estradioL (ESTRACE) 2 MG tablet Take 2 mg by mouth once daily.      [DISCONTINUED] ipratropium (ATROVENT) 42 mcg (0.06 %) nasal spray 1-2 sprays by Nasal route 3 (three) times daily.      [DISCONTINUED] omeprazole (PRILOSEC) 20 MG capsule Take 1 capsule (20 mg total) by mouth once daily. 30 capsule 11       Allergies  Review of patient's allergies indicates:   Allergen Reactions    Adhesive      Adhesive on hormone patch    Adhesive tape-silicones     Insect venom      Bee sting    Venom-honey bee        Physical Examination     Vitals:    09/28/22 1449   BP: 124/84   Pulse: (!) 57   Resp: 20   Temp: 99 °F  (37.2 °C)     Physical Exam  Vitals and nursing note reviewed.   HENT:      Head: Normocephalic.      Right Ear: Tympanic membrane is bulging.      Left Ear: Tympanic membrane is bulging.      Nose: Congestion and rhinorrhea present. Rhinorrhea is purulent.      Right Sinus: Maxillary sinus tenderness and frontal sinus tenderness present.      Left Sinus: Maxillary sinus tenderness and frontal sinus tenderness present.      Mouth/Throat:      Mouth: Mucous membranes are moist.      Pharynx: Oropharyngeal exudate (clear post nasal drainage) and posterior oropharyngeal erythema present.   Eyes:      Conjunctiva/sclera: Conjunctivae normal.   Cardiovascular:      Rate and Rhythm: Normal rate and regular rhythm.      Heart sounds: Normal heart sounds.   Pulmonary:      Effort: Pulmonary effort is normal.      Breath sounds: Normal breath sounds.   Abdominal:      General: Abdomen is flat. Bowel sounds are normal. There is no distension.      Palpations: Abdomen is soft.   Musculoskeletal:         General: No swelling or tenderness. Normal range of motion.      Cervical back: Normal range of motion.      Right lower leg: No edema.      Left lower leg: No edema.   Skin:     General: Skin is warm and dry.   Neurological:      Mental Status: She is alert. Mental status is at baseline.   Psychiatric:         Mood and Affect: Mood is anxious.         Behavior: Behavior normal.             Lab Results   Component Value Date    WBC 10.99 03/12/2022    HGB 11.8 (L) 03/12/2022    HCT 36.2 (L) 03/12/2022    MCV 85.2 03/12/2022     03/12/2022          Sodium   Date Value Ref Range Status   03/12/2022 138 136 - 145 mmol/L Final     Potassium   Date Value Ref Range Status   03/12/2022 4.0 3.5 - 5.1 mmol/L Final     Chloride   Date Value Ref Range Status   03/12/2022 101 98 - 107 mmol/L Final     CO2   Date Value Ref Range Status   03/12/2022 28 21 - 32 mmol/L Final     Glucose   Date Value Ref Range Status   03/12/2022 97 74 -  106 mg/dL Final     BUN   Date Value Ref Range Status   03/12/2022 9 7 - 18 mg/dL Final     Creatinine   Date Value Ref Range Status   03/12/2022 0.56 0.55 - 1.02 mg/dL Final     Calcium   Date Value Ref Range Status   03/12/2022 8.6 8.5 - 10.1 mg/dL Final     Total Protein   Date Value Ref Range Status   03/07/2022 7.6 6.4 - 8.2 g/dL Final     Albumin   Date Value Ref Range Status   03/07/2022 3.2 (L) 3.5 - 5.0 g/dL Final     Bilirubin, Total   Date Value Ref Range Status   03/07/2022 0.3 0.0 - 1.2 mg/dL Final     Alk Phos   Date Value Ref Range Status   03/07/2022 65 37 - 98 U/L Final     AST   Date Value Ref Range Status   03/07/2022 15 15 - 37 U/L Final     ALT   Date Value Ref Range Status   03/07/2022 19 13 - 56 U/L Final     Anion Gap   Date Value Ref Range Status   03/12/2022 13 7 - 16 mmol/L Final     eGFR    Date Value Ref Range Status   07/24/2020 158       eGFR   Date Value Ref Range Status   03/12/2022 141 >=60 mL/min/1.73m² Final      CT Head Without Contrast  Narrative: EXAMINATION:  CT HEAD WITHOUT CONTRAST    CLINICAL HISTORY:  Migraine, unspecified, not intractable, without status migrainosusHeadache, classic migraine;    COMPARISON:  CT brain June 2, 2020    TECHNIQUE:  Multiple axial tomographic images of the brain were obtained without the use of intravenous contrast.    FINDINGS:  Midline structures are nondisplaced.  No convincing evidence of acute intracranial hemorrhage.  No convincing evidence of hydrocephalus.  Visualized paranasal sinuses and mastoid air cells are predominantly clear.  Impression: No convincing imaging evidence of acute intracranial abnormality.    The CT exam was performed using one or more of the following dose    reduction techniques- Automated exposure control, adjustment of the mA    and/or kV according to patient size, and/or use of iterative    reconstructed technique.    Point of Service: [facility]    Electronically signed by: Mj  Jaspal  Date:    08/11/2022  Time:    08:11     Procedures   Assessment and Plan (including Health Maintenance)      Problem List  Smart Sets  Document Outside HM   :    Plan:           Problem List Items Addressed This Visit          Neuro    Migraine headache    Current Assessment & Plan     Continue current medications as needed for Migraines. Follow up as needed.          Relevant Medications    QULIPTA 60 mg Tab    diclofenac potassium (ZIPSOR) 25 mg Cap    TOSYMRA 10 mg/actuation Spry    ondansetron (ZOFRAN-ODT) 8 MG TbDL       Psychiatric    Anxiety    Current Assessment & Plan     Start Trazodone 100mg q HS.  Reviewed treatment plan and medication side effects/risk/benefits/directions on taking medications. Denies SI/HI. Should these symptoms develop, encouraged to proceed to the closest ER for additional evaluation and treatment. Instructed patient to notify family member of recent medication initiation and to watch for worsening symptoms, instructed to go to ER if discussed worsening symptoms occur and clinic is closed. Verbal contract for safety initiated with patient for prescriptions provided today. Instructed patient to return to clinic in 3-4 weeks to discuss effectiveness of drug and any side effects. Patient verbalized understanding of treatment plan and denies any questions.           Relevant Medications    traZODone (DESYREL) 100 MG tablet       ENT    Acute non-recurrent pansinusitis - Primary    Current Assessment & Plan     Rocephin and Decadron given IM in clinic.   Augmentin and Prednisone as ordered.    Reviewed pathology of current symptoms, medication side effects/risk/benefits/directions on taking medications, and worsening or persistent symptoms that require follow up in next 2-3 days. Saline/steroid nasal sprays, antihistamine use, increase fluid intake, and multivitamin/elderberry/Zinc use were recommended. May take Tylenol or Motrin as needed for pain and/or fever. Patient was  instructed to take antibiotic as directed, complete entire course to avoid antibiotic resistance, and take OTC probiotic with antibiotic to prevent GI upset. Patient verbalized understanding of treatment plan and denies any questions.         Relevant Medications    amoxicillin-clavulanate 500-125mg (AUGMENTIN) 500-125 mg Tab    predniSONE (DELTASONE) 10 MG tablet       Endocrine    Vitamin B12 deficiency    Current Assessment & Plan     Continue monthly B12 injections Follow up in 3 months.          Relevant Medications    cyanocobalamin 1,000 mcg/mL injection    Hormone replacement therapy    Current Assessment & Plan     Patient reports she was placed on Estradiol after Hysterectomy by Dr Parisi and has run out and needs refill. I did discuss with patient that this is high dose and she states they tried lower dosage but this is what works for her. Instructed patient we would refill this time but she will need to follow up with Dr Parisi. She verbalized understanding.          Relevant Medications    estradioL (ESTRACE) 2 MG tablet       GI    Gastroesophageal reflux disease without esophagitis    Current Assessment & Plan     Patient reports well controlled on Prilosec. Continue current dosage. Follow up in 3 months or as needed.          Relevant Medications    omeprazole (PRILOSEC) 40 MG capsule     Other Visit Diagnoses       Vitamin D deficiency        Relevant Medications    ergocalciferol (ERGOCALCIFEROL) 50,000 unit Cap            The patient has no Health Maintenance topics of status Not Due    Future Appointments   Date Time Provider Department Center   6/14/2023  9:30 AM JOANA Schroeder [facility] STEVEN Galo        Health Maintenance Due   Topic Date Due    COVID-19 Vaccine (1) Never done    HPV Vaccines (1 - 2-dose series) Never done    TETANUS VACCINE  Never done    Influenza Vaccine (1) 09/01/2022        No follow-ups on file.     Signature:  [facility]  [address] [place] [address]  [place]  [address]    Date of encounter: 9/28/22

## 2022-09-29 RX ORDER — EPINEPHRINE 0.3 MG/.3ML
INJECTION SUBCUTANEOUS
Qty: 1 EACH | Refills: 0 | Status: SHIPPED | OUTPATIENT
Start: 2022-09-29 | End: 2022-09-29 | Stop reason: SDUPTHER

## 2022-09-29 RX ORDER — EPINEPHRINE 0.3 MG/.3ML
INJECTION SUBCUTANEOUS
Qty: 1 EACH | Refills: 1 | Status: SHIPPED | OUTPATIENT
Start: 2022-09-29 | End: 2023-06-13 | Stop reason: SDUPTHER

## 2022-10-04 PROBLEM — Z79.890 HORMONE REPLACEMENT THERAPY: Status: ACTIVE | Noted: 2022-10-04

## 2022-10-04 PROBLEM — K21.9 GASTROESOPHAGEAL REFLUX DISEASE WITHOUT ESOPHAGITIS: Status: ACTIVE | Noted: 2022-10-04

## 2022-10-04 PROBLEM — F41.9 ANXIETY: Status: ACTIVE | Noted: 2022-10-04

## 2022-10-04 PROBLEM — E53.8 VITAMIN B12 DEFICIENCY: Status: ACTIVE | Noted: 2022-10-04

## 2022-10-04 PROBLEM — J01.40 ACUTE NON-RECURRENT PANSINUSITIS: Status: ACTIVE | Noted: 2022-10-04

## 2022-10-04 PROBLEM — G43.909 MIGRAINE HEADACHE: Status: ACTIVE | Noted: 2019-11-13

## 2022-10-04 RX ORDER — AMOXICILLIN AND CLAVULANATE POTASSIUM 500; 125 MG/1; MG/1
1 TABLET, FILM COATED ORAL 2 TIMES DAILY
Qty: 20 TABLET | Refills: 0 | Status: SHIPPED | OUTPATIENT
Start: 2022-10-04 | End: 2022-12-01

## 2022-10-04 RX ORDER — PREDNISONE 10 MG/1
TABLET ORAL
Qty: 13 TABLET | Refills: 0 | Status: SHIPPED | OUTPATIENT
Start: 2022-10-04 | End: 2022-12-01

## 2022-10-04 NOTE — ASSESSMENT & PLAN NOTE
Start Trazodone 100mg q HS.  Reviewed treatment plan and medication side effects/risk/benefits/directions on taking medications. Denies SI/HI. Should these symptoms develop, encouraged to proceed to the closest ER for additional evaluation and treatment. Instructed patient to notify family member of recent medication initiation and to watch for worsening symptoms, instructed to go to ER if discussed worsening symptoms occur and clinic is closed. Verbal contract for safety initiated with patient for prescriptions provided today. Instructed patient to return to clinic in 3-4 weeks to discuss effectiveness of drug and any side effects. Patient verbalized understanding of treatment plan and denies any questions.

## 2022-10-04 NOTE — ASSESSMENT & PLAN NOTE
Patient reports well controlled on Prilosec. Continue current dosage. Follow up in 3 months or as needed.

## 2022-10-04 NOTE — ASSESSMENT & PLAN NOTE
Patient reports she was placed on Estradiol after Hysterectomy by Dr Parisi and has run out and needs refill. I did discuss with patient that this is high dose and she states they tried lower dosage but this is what works for her. Instructed patient we would refill this time but she will need to follow up with Dr Parisi. She verbalized understanding.

## 2022-10-04 NOTE — ASSESSMENT & PLAN NOTE
Rocephin and Decadron given IM in clinic.   Augmentin and Prednisone as ordered.    Reviewed pathology of current symptoms, medication side effects/risk/benefits/directions on taking medications, and worsening or persistent symptoms that require follow up in next 2-3 days. Saline/steroid nasal sprays, antihistamine use, increase fluid intake, and multivitamin/elderberry/Zinc use were recommended. May take Tylenol or Motrin as needed for pain and/or fever. Patient was instructed to take antibiotic as directed, complete entire course to avoid antibiotic resistance, and take OTC probiotic with antibiotic to prevent GI upset. Patient verbalized understanding of treatment plan and denies any questions.

## 2022-11-01 RX ORDER — ALBUTEROL SULFATE 90 UG/1
1-2 AEROSOL, METERED RESPIRATORY (INHALATION) EVERY 6 HOURS PRN
Qty: 18 G | Refills: 1 | Status: SHIPPED | OUTPATIENT
Start: 2022-11-01 | End: 2023-09-01 | Stop reason: SDUPTHER

## 2022-11-01 RX ORDER — ALBUTEROL SULFATE 90 UG/1
1-2 AEROSOL, METERED RESPIRATORY (INHALATION) EVERY 6 HOURS PRN
COMMUNITY
End: 2022-11-01 | Stop reason: SDUPTHER

## 2022-11-04 ENCOUNTER — HOSPITAL ENCOUNTER (EMERGENCY)
Facility: HOSPITAL | Age: 27
Discharge: HOME | End: 2022-11-04
Attending: FAMILY MEDICINE
Payer: COMMERCIAL

## 2022-11-04 VITALS
SYSTOLIC BLOOD PRESSURE: 132 MMHG | TEMPERATURE: 99 F | DIASTOLIC BLOOD PRESSURE: 72 MMHG | WEIGHT: 233 LBS | HEART RATE: 98 BPM | RESPIRATION RATE: 20 BRPM | BODY MASS INDEX: 35.43 KG/M2 | OXYGEN SATURATION: 99 %

## 2022-11-04 DIAGNOSIS — G43.909 MIGRAINE WITHOUT STATUS MIGRAINOSUS, NOT INTRACTABLE, UNSPECIFIED MIGRAINE TYPE: ICD-10-CM

## 2022-11-04 DIAGNOSIS — R11.2 NAUSEA AND VOMITING, UNSPECIFIED VOMITING TYPE: Primary | ICD-10-CM

## 2022-11-04 LAB
ANION GAP SERPL CALCULATED.3IONS-SCNC: 14 MMOL/L (ref 7–16)
BASOPHILS # BLD AUTO: 0.01 K/UL (ref 0–0.2)
BASOPHILS NFR BLD AUTO: 0.1 % (ref 0–1)
BUN SERPL-MCNC: 8 MG/DL (ref 7–18)
BUN/CREAT SERPL: 14 (ref 6–20)
CALCIUM SERPL-MCNC: 8.5 MG/DL (ref 8.5–10.1)
CHLORIDE SERPL-SCNC: 103 MMOL/L (ref 98–107)
CO2 SERPL-SCNC: 22 MMOL/L (ref 21–32)
CREAT SERPL-MCNC: 0.59 MG/DL (ref 0.55–1.02)
DIFFERENTIAL METHOD BLD: ABNORMAL
EGFR (NO RACE VARIABLE) (RUSH/TITUS): 129 ML/MIN/1.73M²
EOSINOPHIL # BLD AUTO: 0.02 K/UL (ref 0–0.5)
EOSINOPHIL NFR BLD AUTO: 0.2 % (ref 1–4)
ERYTHROCYTE [DISTWIDTH] IN BLOOD BY AUTOMATED COUNT: 11.5 % (ref 11.5–14.5)
GLUCOSE SERPL-MCNC: 103 MG/DL (ref 74–106)
HCT VFR BLD AUTO: 36.5 % (ref 38–47)
HGB BLD-MCNC: 12.3 G/DL (ref 12–16)
IMM GRANULOCYTES # BLD AUTO: 0.02 K/UL (ref 0–0.04)
IMM GRANULOCYTES NFR BLD: 0.2 % (ref 0–0.4)
LYMPHOCYTES # BLD AUTO: 1.06 K/UL (ref 1–4.8)
LYMPHOCYTES NFR BLD AUTO: 10.8 % (ref 27–41)
MCH RBC QN AUTO: 28.1 PG (ref 27–31)
MCHC RBC AUTO-ENTMCNC: 33.7 G/DL (ref 32–36)
MCV RBC AUTO: 83.5 FL (ref 80–96)
MONOCYTES # BLD AUTO: 0.56 K/UL (ref 0–0.8)
MONOCYTES NFR BLD AUTO: 5.7 % (ref 2–6)
MPC BLD CALC-MCNC: 9.1 FL (ref 9.4–12.4)
NEUTROPHILS # BLD AUTO: 8.19 K/UL (ref 1.8–7.7)
NEUTROPHILS NFR BLD AUTO: 83 % (ref 53–65)
NRBC # BLD AUTO: 0 X10E3/UL
NRBC, AUTO (.00): 0 %
PLATELET # BLD AUTO: 393 K/UL (ref 150–400)
POTASSIUM SERPL-SCNC: 4 MMOL/L (ref 3.5–5.1)
RBC # BLD AUTO: 4.37 M/UL (ref 4.2–5.4)
SODIUM SERPL-SCNC: 135 MMOL/L (ref 136–145)
WBC # BLD AUTO: 9.86 K/UL (ref 4.5–11)

## 2022-11-04 PROCEDURE — 63600175 PHARM REV CODE 636 W HCPCS: Performed by: FAMILY MEDICINE

## 2022-11-04 PROCEDURE — 99284 EMERGENCY DEPT VISIT MOD MDM: CPT | Mod: ,,, | Performed by: FAMILY MEDICINE

## 2022-11-04 PROCEDURE — 99284 EMERGENCY DEPT VISIT MOD MDM: CPT | Mod: 25

## 2022-11-04 PROCEDURE — 96374 THER/PROPH/DIAG INJ IV PUSH: CPT

## 2022-11-04 PROCEDURE — 85025 COMPLETE CBC W/AUTO DIFF WBC: CPT | Performed by: FAMILY MEDICINE

## 2022-11-04 PROCEDURE — 36415 COLL VENOUS BLD VENIPUNCTURE: CPT | Performed by: FAMILY MEDICINE

## 2022-11-04 PROCEDURE — 96361 HYDRATE IV INFUSION ADD-ON: CPT

## 2022-11-04 PROCEDURE — 25000003 PHARM REV CODE 250: Performed by: FAMILY MEDICINE

## 2022-11-04 PROCEDURE — 80048 BASIC METABOLIC PNL TOTAL CA: CPT | Performed by: FAMILY MEDICINE

## 2022-11-04 PROCEDURE — 99284 PR EMERGENCY DEPT VISIT,LEVEL IV: ICD-10-PCS | Mod: ,,, | Performed by: FAMILY MEDICINE

## 2022-11-04 RX ORDER — ONDANSETRON 2 MG/ML
4 INJECTION INTRAMUSCULAR; INTRAVENOUS
Status: COMPLETED | OUTPATIENT
Start: 2022-11-04 | End: 2022-11-04

## 2022-11-04 RX ORDER — ONDANSETRON 4 MG/1
4 TABLET, ORALLY DISINTEGRATING ORAL EVERY 6 HOURS PRN
Qty: 20 TABLET | Refills: 0 | Status: SHIPPED | OUTPATIENT
Start: 2022-11-04 | End: 2022-12-01

## 2022-11-04 RX ADMIN — ONDANSETRON 4 MG: 2 INJECTION INTRAMUSCULAR; INTRAVENOUS at 02:11

## 2022-11-04 RX ADMIN — SODIUM CHLORIDE 1000 ML: 9 INJECTION, SOLUTION INTRAVENOUS at 02:11

## 2022-11-04 NOTE — Clinical Note
Miya Beaver accompanied their spouse to the emergency department on 11/4/2022. They may return to work on 11/05/2022.      If you have any questions or concerns, please don't hesitate to call.       RN

## 2022-11-04 NOTE — Clinical Note
"Raygene "Panchito Acosta was seen and treated in our emergency department on 11/4/2022.  She may return to work on 11/07/2022.       If you have any questions or concerns, please don't hesitate to call.      Juliane Lauren MD"

## 2022-11-04 NOTE — ED PROVIDER NOTES
Encounter Date: 11/4/2022    SCRIBE #1 NOTE: I, Dianelys Lee, am scribing for, and in the presence of,  Juliane Zapata MD. I have scribed the entire note.     History     Chief Complaint   Patient presents with    Vomiting     Pt states she had flu last week - yesterday starting having n/v - states she cant keep food or meds down    Nausea    Fever     This is a 27 y/o female,who presents to the ED with complaints of vomiting for the past 2 days. She states she was Dx with the flu 10/27/2022. She reports for the past 2 days she has been running a fever on and off but she states there is none while here in the ED. She notes nausea and vomiting on and off as well. She denies any congestion, cough, sore throat, diarrhea, or rhinorrhea. She has no other complaints/pain in the ED at this time.     The history is provided by the patient.   Review of patient's allergies indicates:   Allergen Reactions    Adhesive      Adhesive on hormone patch    Adhesive tape-silicones     Insect venom      Bee sting    Venom-honey bee      Past Medical History:   Diagnosis Date    Cardiac murmur     Endometriosis     Migraine headache 11/13/2019    PCOS (polycystic ovarian syndrome)     Periumbilical hernia 08/20/2020    repaired by Dr. Dwyer on 10/07/2020    Retroflexion of uterus 07/31/2018    3rd degree     Past Surgical History:   Procedure Laterality Date    CHOLECYSTECTOMY  3-2022    Cyst removed from cervix      Cyst removed from ovary      DILATION AND CURETTAGE OF UTERUS      HERNIA REPAIR      HYSTERECTOMY      LAPAROSCOPIC CHOLECYSTECTOMY N/A 03/09/2022    Procedure: CHOLECYSTECTOMY, LAPAROSCOPIC;  Surgeon: Denny Dwyer MD;  Location: [facility];  Service: General;  Laterality: N/A;    LOOP ELECTROSURGICAL EXCISION PROCEDURE (LEEP)      Operative Laparoscopy with lysis of adhesions      RH/BSO with limited cystoscopy  09/24/2018    TONSILLECTOMY  2018     Family History   Problem Relation Age of Onset    Breast cancer  Other     Diabetes Other      Social History     Tobacco Use    Smoking status: Never    Smokeless tobacco: Never   Substance Use Topics    Alcohol use: Not Currently    Drug use: Never     Review of Systems   Constitutional:  Positive for fever.   HENT:  Negative for congestion, rhinorrhea and sore throat.    Respiratory:  Negative for cough.    Gastrointestinal:  Positive for nausea and vomiting. Negative for diarrhea.   All other systems reviewed and are negative.    Physical Exam     Initial Vitals [11/04/22 0133]   BP Pulse Resp Temp SpO2   128/78 101 18 99.8 °F (37.7 °C) 99 %      MAP       --         Physical Exam    Nursing note and vitals reviewed.  Constitutional: She appears well-developed and well-nourished. No distress.   HENT:   Head: Normocephalic and atraumatic.   Eyes: Conjunctivae and EOM are normal. Pupils are equal, round, and reactive to light.   Neck: Neck supple.   Normal range of motion.  Cardiovascular:  Normal rate, regular rhythm, normal heart sounds and intact distal pulses.           Pulmonary/Chest: Breath sounds normal. No respiratory distress.   Abdominal: Abdomen is soft. Bowel sounds are normal. There is no abdominal tenderness.   Musculoskeletal:         General: No tenderness. Normal range of motion.      Cervical back: Normal range of motion and neck supple.     Neurological: She is alert and oriented to person, place, and time.   Skin: Skin is warm and dry.   Psychiatric: She has a normal mood and affect.       ED Course   Procedures  Labs Reviewed   BASIC METABOLIC PANEL - Abnormal; Notable for the following components:       Result Value    Sodium 135 (*)     All other components within normal limits   CBC WITH DIFFERENTIAL - Abnormal; Notable for the following components:    Hematocrit 36.5 (*)     MPV 9.1 (*)     Neutrophils % 83.0 (*)     Lymphocytes % 10.8 (*)     Eosinophils % 0.2 (*)     Neutrophils, Abs 8.19 (*)     All other components within normal limits   CBC W/  AUTO DIFFERENTIAL    Narrative:     The following orders were created for panel order CBC auto differential.  Procedure                               Abnormality         Status                     ---------                               -----------         ------                     CBC with Differential[[id number]]        Abnormal            Final result                 Please view results for these tests on the individual orders.          Imaging Results    None          Medications   sodium chloride 0.9% bolus 1,000 mL (0 mLs Intravenous Stopped 11/4/22 0304)   ondansetron injection 4 mg (4 mg Intravenous Given 11/4/22 0203)                Attending Attestation:           Physician Attestation for Scribe:  Physician Attestation Statement for Scribe #1: I, Juliane Lauren MD, reviewed documentation, as scribed by Dianelys Lee in my presence, and it is both accurate and complete.                        Clinical Impression:   Final diagnoses:  [R11.2] Nausea and vomiting, unspecified vomiting type (Primary)      ED Disposition Condition    Discharge Stable          ED Prescriptions       Medication Sig Dispense Start Date End Date Auth. Provider    ondansetron (ZOFRAN-ODT) 4 MG TbDL Take 1 tablet (4 mg total) by mouth every 6 (six) hours as needed (nausea and vomiting). 20 tablet 11/4/2022 -- Juliaen Lauren MD          Follow-up Information    None          Juliane Lauren MD  11/04/22 2916

## 2022-11-11 RX ORDER — ALBUTEROL SULFATE 0.83 MG/ML
2.5 SOLUTION RESPIRATORY (INHALATION)
COMMUNITY
End: 2022-11-11 | Stop reason: SDUPTHER

## 2022-11-11 RX ORDER — ALBUTEROL SULFATE 0.83 MG/ML
2.5 SOLUTION RESPIRATORY (INHALATION)
Qty: 1 EACH | Refills: 1 | Status: SHIPPED | OUTPATIENT
Start: 2022-11-11

## 2022-12-01 ENCOUNTER — OFFICE VISIT (OUTPATIENT)
Dept: FAMILY MEDICINE | Facility: CLINIC | Age: 27
End: 2022-12-01
Payer: COMMERCIAL

## 2022-12-01 VITALS
DIASTOLIC BLOOD PRESSURE: 70 MMHG | SYSTOLIC BLOOD PRESSURE: 116 MMHG | RESPIRATION RATE: 18 BRPM | OXYGEN SATURATION: 99 % | WEIGHT: 225.63 LBS | HEIGHT: 68 IN | TEMPERATURE: 98 F | BODY MASS INDEX: 34.19 KG/M2 | HEART RATE: 75 BPM

## 2022-12-01 DIAGNOSIS — F41.9 ANXIETY: ICD-10-CM

## 2022-12-01 DIAGNOSIS — J45.20 MILD INTERMITTENT ASTHMA, UNSPECIFIED WHETHER COMPLICATED: ICD-10-CM

## 2022-12-01 DIAGNOSIS — H60.393 OTHER INFECTIVE ACUTE OTITIS EXTERNA OF BOTH EARS: Primary | ICD-10-CM

## 2022-12-01 DIAGNOSIS — E53.8 VITAMIN B12 DEFICIENCY: ICD-10-CM

## 2022-12-01 DIAGNOSIS — E55.9 VITAMIN D DEFICIENCY: ICD-10-CM

## 2022-12-01 DIAGNOSIS — G43.909 MIGRAINE WITHOUT STATUS MIGRAINOSUS, NOT INTRACTABLE, UNSPECIFIED MIGRAINE TYPE: ICD-10-CM

## 2022-12-01 DIAGNOSIS — Z79.890 HORMONE REPLACEMENT THERAPY: ICD-10-CM

## 2022-12-01 PROCEDURE — 3074F SYST BP LT 130 MM HG: CPT | Mod: ,,, | Performed by: NURSE PRACTITIONER

## 2022-12-01 PROCEDURE — 3008F PR BODY MASS INDEX (BMI) DOCUMENTED: ICD-10-PCS | Mod: ,,, | Performed by: NURSE PRACTITIONER

## 2022-12-01 PROCEDURE — 99213 PR OFFICE/OUTPT VISIT, EST, LEVL III, 20-29 MIN: ICD-10-PCS | Mod: ,,, | Performed by: NURSE PRACTITIONER

## 2022-12-01 PROCEDURE — 1159F MED LIST DOCD IN RCRD: CPT | Mod: ,,, | Performed by: NURSE PRACTITIONER

## 2022-12-01 PROCEDURE — 3078F DIAST BP <80 MM HG: CPT | Mod: ,,, | Performed by: NURSE PRACTITIONER

## 2022-12-01 PROCEDURE — 1159F PR MEDICATION LIST DOCUMENTED IN MEDICAL RECORD: ICD-10-PCS | Mod: ,,, | Performed by: NURSE PRACTITIONER

## 2022-12-01 PROCEDURE — 1160F PR REVIEW ALL MEDS BY PRESCRIBER/CLIN PHARMACIST DOCUMENTED: ICD-10-PCS | Mod: ,,, | Performed by: NURSE PRACTITIONER

## 2022-12-01 PROCEDURE — 3078F PR MOST RECENT DIASTOLIC BLOOD PRESSURE < 80 MM HG: ICD-10-PCS | Mod: ,,, | Performed by: NURSE PRACTITIONER

## 2022-12-01 PROCEDURE — 99213 OFFICE O/P EST LOW 20 MIN: CPT | Mod: ,,, | Performed by: NURSE PRACTITIONER

## 2022-12-01 PROCEDURE — 3008F BODY MASS INDEX DOCD: CPT | Mod: ,,, | Performed by: NURSE PRACTITIONER

## 2022-12-01 PROCEDURE — 1160F RVW MEDS BY RX/DR IN RCRD: CPT | Mod: ,,, | Performed by: NURSE PRACTITIONER

## 2022-12-01 PROCEDURE — 3074F PR MOST RECENT SYSTOLIC BLOOD PRESSURE < 130 MM HG: ICD-10-PCS | Mod: ,,, | Performed by: NURSE PRACTITIONER

## 2022-12-01 RX ORDER — RIZATRIPTAN BENZOATE 10 MG/1
TABLET, ORALLY DISINTEGRATING ORAL
COMMUNITY
Start: 2022-11-30 | End: 2023-07-18

## 2022-12-01 RX ORDER — FLUTICASONE PROPIONATE AND SALMETEROL 250; 50 UG/1; UG/1
1 POWDER RESPIRATORY (INHALATION) DAILY PRN
Qty: 1 EACH | Refills: 3 | Status: SHIPPED | OUTPATIENT
Start: 2022-12-01 | End: 2023-09-01 | Stop reason: SDUPTHER

## 2022-12-01 RX ORDER — MONTELUKAST SODIUM 10 MG/1
TABLET ORAL
Qty: 30 TABLET | Refills: 3 | Status: SHIPPED | OUTPATIENT
Start: 2022-12-01 | End: 2023-06-13 | Stop reason: SDUPTHER

## 2022-12-01 RX ORDER — ESTRADIOL 2 MG/1
2 TABLET ORAL 2 TIMES DAILY
Qty: 60 TABLET | Refills: 2 | Status: SHIPPED | OUTPATIENT
Start: 2022-12-01 | End: 2023-03-02 | Stop reason: SDUPTHER

## 2022-12-01 RX ORDER — CYANOCOBALAMIN 1000 UG/ML
INJECTION, SOLUTION INTRAMUSCULAR; SUBCUTANEOUS
Qty: 1 ML | Refills: 5 | Status: SHIPPED | OUTPATIENT
Start: 2022-12-01

## 2022-12-01 RX ORDER — TRAZODONE HYDROCHLORIDE 100 MG/1
100 TABLET ORAL NIGHTLY
Qty: 30 TABLET | Refills: 3 | Status: SHIPPED | OUTPATIENT
Start: 2022-12-01 | End: 2023-06-13

## 2022-12-01 RX ORDER — CIPROFLOXACIN AND DEXAMETHASONE 3; 1 MG/ML; MG/ML
4 SUSPENSION/ DROPS AURICULAR (OTIC) 2 TIMES DAILY
Qty: 7.5 ML | Refills: 0 | Status: SHIPPED | OUTPATIENT
Start: 2022-12-01 | End: 2022-12-06 | Stop reason: SDUPTHER

## 2022-12-01 RX ORDER — ERGOCALCIFEROL 1.25 MG/1
50000 CAPSULE ORAL
Qty: 4 CAPSULE | Refills: 2 | Status: SHIPPED | OUTPATIENT
Start: 2022-12-01 | End: 2023-06-13 | Stop reason: SDUPTHER

## 2022-12-06 RX ORDER — CIPROFLOXACIN AND DEXAMETHASONE 3; 1 MG/ML; MG/ML
4 SUSPENSION/ DROPS AURICULAR (OTIC) 2 TIMES DAILY
Qty: 7.5 ML | Refills: 0 | Status: SHIPPED | OUTPATIENT
Start: 2022-12-06 | End: 2023-06-13

## 2022-12-14 ENCOUNTER — OFFICE VISIT (OUTPATIENT)
Dept: FAMILY MEDICINE | Facility: CLINIC | Age: 27
End: 2022-12-14
Payer: COMMERCIAL

## 2022-12-14 VITALS
HEIGHT: 68 IN | OXYGEN SATURATION: 98 % | DIASTOLIC BLOOD PRESSURE: 62 MMHG | BODY MASS INDEX: 34.4 KG/M2 | SYSTOLIC BLOOD PRESSURE: 118 MMHG | TEMPERATURE: 99 F | WEIGHT: 227 LBS | RESPIRATION RATE: 18 BRPM | HEART RATE: 79 BPM

## 2022-12-14 DIAGNOSIS — E53.8 VITAMIN B12 DEFICIENCY: Primary | ICD-10-CM

## 2022-12-14 DIAGNOSIS — G43.111 INTRACTABLE MIGRAINE WITH AURA WITH STATUS MIGRAINOSUS: ICD-10-CM

## 2022-12-14 DIAGNOSIS — E55.9 VITAMIN D DEFICIENCY: ICD-10-CM

## 2022-12-14 PROBLEM — H60.503 ACUTE OTITIS EXTERNA OF BOTH EARS: Status: ACTIVE | Noted: 2022-12-14

## 2022-12-14 PROBLEM — J01.40 ACUTE NON-RECURRENT PANSINUSITIS: Status: RESOLVED | Noted: 2022-10-04 | Resolved: 2022-12-14

## 2022-12-14 PROBLEM — J45.20 MILD INTERMITTENT ASTHMA: Status: ACTIVE | Noted: 2022-12-14

## 2022-12-14 PROCEDURE — 1160F RVW MEDS BY RX/DR IN RCRD: CPT | Mod: ,,, | Performed by: NURSE PRACTITIONER

## 2022-12-14 PROCEDURE — 3078F DIAST BP <80 MM HG: CPT | Mod: ,,, | Performed by: NURSE PRACTITIONER

## 2022-12-14 PROCEDURE — 1160F PR REVIEW ALL MEDS BY PRESCRIBER/CLIN PHARMACIST DOCUMENTED: ICD-10-PCS | Mod: ,,, | Performed by: NURSE PRACTITIONER

## 2022-12-14 PROCEDURE — 3074F PR MOST RECENT SYSTOLIC BLOOD PRESSURE < 130 MM HG: ICD-10-PCS | Mod: ,,, | Performed by: NURSE PRACTITIONER

## 2022-12-14 PROCEDURE — 1159F PR MEDICATION LIST DOCUMENTED IN MEDICAL RECORD: ICD-10-PCS | Mod: ,,, | Performed by: NURSE PRACTITIONER

## 2022-12-14 PROCEDURE — 3008F BODY MASS INDEX DOCD: CPT | Mod: ,,, | Performed by: NURSE PRACTITIONER

## 2022-12-14 PROCEDURE — 1159F MED LIST DOCD IN RCRD: CPT | Mod: ,,, | Performed by: NURSE PRACTITIONER

## 2022-12-14 PROCEDURE — 3008F PR BODY MASS INDEX (BMI) DOCUMENTED: ICD-10-PCS | Mod: ,,, | Performed by: NURSE PRACTITIONER

## 2022-12-14 PROCEDURE — 96372 PR INJECTION,THERAP/PROPH/DIAG2ST, IM OR SUBCUT: ICD-10-PCS | Mod: ,,, | Performed by: NURSE PRACTITIONER

## 2022-12-14 PROCEDURE — 99213 OFFICE O/P EST LOW 20 MIN: CPT | Mod: 25,,, | Performed by: NURSE PRACTITIONER

## 2022-12-14 PROCEDURE — 3078F PR MOST RECENT DIASTOLIC BLOOD PRESSURE < 80 MM HG: ICD-10-PCS | Mod: ,,, | Performed by: NURSE PRACTITIONER

## 2022-12-14 PROCEDURE — 99213 PR OFFICE/OUTPT VISIT, EST, LEVL III, 20-29 MIN: ICD-10-PCS | Mod: 25,,, | Performed by: NURSE PRACTITIONER

## 2022-12-14 PROCEDURE — 3074F SYST BP LT 130 MM HG: CPT | Mod: ,,, | Performed by: NURSE PRACTITIONER

## 2022-12-14 PROCEDURE — 96372 THER/PROPH/DIAG INJ SC/IM: CPT | Mod: ,,, | Performed by: NURSE PRACTITIONER

## 2022-12-14 RX ORDER — PROMETHAZINE HYDROCHLORIDE 25 MG/ML
25 INJECTION, SOLUTION INTRAMUSCULAR; INTRAVENOUS
Status: COMPLETED | OUTPATIENT
Start: 2022-12-14 | End: 2022-12-14

## 2022-12-14 RX ORDER — KETOROLAC TROMETHAMINE 30 MG/ML
30 INJECTION, SOLUTION INTRAMUSCULAR; INTRAVENOUS
Status: COMPLETED | OUTPATIENT
Start: 2022-12-14 | End: 2022-12-14

## 2022-12-14 RX ORDER — BUTALBITAL, ACETAMINOPHEN AND CAFFEINE 50; 325; 40 MG/1; MG/1; MG/1
1 TABLET ORAL EVERY 4 HOURS PRN
Qty: 30 TABLET | Refills: 0 | Status: SHIPPED | OUTPATIENT
Start: 2022-12-14 | End: 2023-01-12 | Stop reason: SDUPTHER

## 2022-12-14 RX ORDER — GALCANEZUMAB 120 MG/ML
INJECTION, SOLUTION SUBCUTANEOUS
COMMUNITY
Start: 2022-12-08 | End: 2023-06-19

## 2022-12-14 RX ORDER — ONDANSETRON 8 MG/1
TABLET, ORALLY DISINTEGRATING ORAL
COMMUNITY
Start: 2022-12-13 | End: 2023-09-01 | Stop reason: SDUPTHER

## 2022-12-14 RX ADMIN — PROMETHAZINE HYDROCHLORIDE 25 MG: 25 INJECTION, SOLUTION INTRAMUSCULAR; INTRAVENOUS at 10:12

## 2022-12-14 RX ADMIN — KETOROLAC TROMETHAMINE 30 MG: 30 INJECTION, SOLUTION INTRAMUSCULAR; INTRAVENOUS at 10:12

## 2022-12-14 NOTE — ASSESSMENT & PLAN NOTE
Stable on current meds. Continue Singulair and inhaler as ordered. Follow up in 3 months or as needed.

## 2022-12-14 NOTE — PROGRESS NOTES
Valeria Melendrez NP   [place] [id number]  [place]  [address]      PATIENT NAME: Yeny Acosta  : 1995  DATE: 22  MRN: [id number]      Billing Provider: Valeria Melendrez NP  Level of Service: OR OFFICE/OUTPT VISIT, EST, LEVL III, 20-29 MIN  Patient PCP Information       Provider PCP Type    Guille Parisi MD General            Reason for Visit / Chief Complaint: Medication Refill       Update PCP  Update Chief Complaint         History of Present Illness / Problem Focused Workflow     Yeny Acosta presents to the clinic with Medication Refill     27 year old female presents to clinic with requests for refill of her medications. She does report some bilat ear pain and itching over the last week or so and would like to have that checked too. Denies other problems.       Review of Systems     @Review of Systems   Constitutional:  Negative for activity change, appetite change, fatigue, fever and unexpected weight change.   HENT:  Positive for ear pain. Negative for nasal congestion, hearing loss, rhinorrhea, sinus pressure/congestion, sore throat and trouble swallowing.    Eyes:  Negative for pain, discharge, redness, visual disturbance and eye dryness.   Respiratory:  Negative for cough, chest tightness, shortness of breath and wheezing.    Cardiovascular:  Negative for chest pain, palpitations and leg swelling.   Gastrointestinal:  Negative for abdominal distention, abdominal pain, blood in stool, constipation, diarrhea and vomiting.   Endocrine: Negative for cold intolerance, heat intolerance, polydipsia and polyuria.   Genitourinary:  Negative for bladder incontinence, difficulty urinating, dysuria, frequency, hematuria, menstrual problem and urgency.   Musculoskeletal:  Positive for arthralgias and neck pain. Negative for gait problem, joint swelling and myalgias.   Integumentary:  Negative for color change, rash and wound.   Allergic/Immunologic: Negative for  environmental allergies and food allergies.   Neurological:  Positive for headaches. Negative for dizziness, weakness and light-headedness.   Psychiatric/Behavioral:  Negative for behavioral problems, confusion, dysphoric mood and sleep disturbance.      Medical / Social / Family History     Past Medical History:   Diagnosis Date    Cardiac murmur     Endometriosis     Migraine headache 11/13/2019    PCOS (polycystic ovarian syndrome)     Periumbilical hernia 08/20/2020    repaired by Dr. Dwyer on 10/07/2020    Retroflexion of uterus 07/31/2018    3rd degree       Past Surgical History:   Procedure Laterality Date    CHOLECYSTECTOMY  3-2022    Cyst removed from cervix      Cyst removed from ovary      DILATION AND CURETTAGE OF UTERUS      HERNIA REPAIR      HYSTERECTOMY      LAPAROSCOPIC CHOLECYSTECTOMY N/A 03/09/2022    Procedure: CHOLECYSTECTOMY, LAPAROSCOPIC;  Surgeon: Denny Dwyer MD;  Location: [facility];  Service: General;  Laterality: N/A;    LOOP ELECTROSURGICAL EXCISION PROCEDURE (LEEP)      Operative Laparoscopy with lysis of adhesions      RH/BSO with limited cystoscopy  09/24/2018    TONSILLECTOMY  2018       Social History  Ms.  reports that she has never smoked. She has been exposed to tobacco smoke. She has never used smokeless tobacco. She reports current alcohol use of about 4.0 standard drinks per week. She reports that she does not use drugs.    Family History  Ms.'s family history includes Breast cancer in an other family member; Diabetes in an other family member.    Medications and Allergies     Medications  Outpatient Medications Marked as Taking for the 12/1/22 encounter (Office Visit) with Valeria Melendrez NP   Medication Sig Dispense Refill    albuterol (PROVENTIL) 2.5 mg /3 mL (0.083 %) nebulizer solution Take 3 mLs (2.5 mg total) by nebulization every 4 to 6 hours as needed for Wheezing. 1 each 1    albuterol (PROVENTIL/VENTOLIN HFA) 90 mcg/actuation inhaler Inhale 1-2 puffs into the  lungs every 6 (six) hours as needed for Wheezing. Rescue 18 g 1    EPINEPHrine (EPIPEN) 0.3 mg/0.3 mL AtIn epinephrine 0.3 mg/0.3 mL injection, auto-injector  USE AS DIRECTED FOR ACUTE ALLERGIC REACTION Strength: 0.3 mg/0.3 mL 1 each 1    NURTEC 75 mg odt Take 75 mg by mouth once as needed for Migraine.      omeprazole (PRILOSEC) 40 MG capsule Take 1 capsule (40 mg total) by mouth once daily. 30 capsule 11    prochlorperazine (COMPAZINE) 10 MG tablet Take 10 mg by mouth 2 (two) times daily as needed.      rizatriptan (MAXALT-MLT) 10 MG disintegrating tablet Take by mouth.      tiZANidine (ZANAFLEX) 4 MG tablet TAKE 1 OR 2 TABLETS BY MOUTH AT BEDTIME AS NEEDED FOR NECK PAIN / SPASM      TOSYMRA 10 mg/actuation Spry 1 spray by Each Nostril route as needed for Migraine.      triamcinolone acetonide 0.1% (KENALOG) 0.1 % ointment Apply to affected area 2 TIMES A DAY, NO LONGER THEN 1 WEEK 454 g 0    [DISCONTINUED] cyanocobalamin 1,000 mcg/mL injection cyanocobalamin (vit B-12) 1,000 mcg/mL injection solution  INJECT 1 ML INTRAMUSCULARLY MONTHLY 1 mL 5    [DISCONTINUED] ergocalciferol (ERGOCALCIFEROL) 50,000 unit Cap Take 1 capsule (50,000 Units total) by mouth every 7 days. 4 capsule 2    [DISCONTINUED] estradioL (ESTRACE) 2 MG tablet Take 1 tablet (2 mg total) by mouth 2 (two) times a day. 60 tablet 2    [DISCONTINUED] fluticasone-salmeterol diskus inhaler 250-50 mcg fluticasone 250 mcg-salmeterol 50 mcg/dose blistr powdr for inhalation   INHALE 1 DOSE BY MOUTH TWICE DAILY      [DISCONTINUED] montelukast (SINGULAIR) 10 mg tablet montelukast 10 mg tablet   TAKE 1 TABLET BY MOUTH DAILY AT BEDTIME      [DISCONTINUED] traZODone (DESYREL) 100 MG tablet Take 1 tablet (100 mg total) by mouth every evening. 30 tablet 3       Allergies  Review of patient's allergies indicates:   Allergen Reactions    Adhesive      Adhesive on hormone patch    Adhesive tape-silicones     Insect venom      Bee sting    Venom-honey bee         Physical Examination     Vitals:    12/01/22 0935   BP: 116/70   Pulse: 75   Resp: 18   Temp: 98.3 °F (36.8 °C)     Physical Exam  Vitals and nursing note reviewed.   HENT:      Head: Normocephalic.      Right Ear: Tympanic membrane normal. Swelling and tenderness present.      Left Ear: Tympanic membrane normal. Swelling and tenderness present.      Nose: Nose normal.      Mouth/Throat:      Mouth: Mucous membranes are moist.      Pharynx: Oropharynx is clear. No posterior oropharyngeal erythema.   Eyes:      Conjunctiva/sclera: Conjunctivae normal.   Cardiovascular:      Rate and Rhythm: Normal rate and regular rhythm.      Pulses: Normal pulses.      Heart sounds: Normal heart sounds.   Pulmonary:      Effort: Pulmonary effort is normal.      Breath sounds: Normal breath sounds.   Abdominal:      General: Abdomen is flat. Bowel sounds are normal. There is no distension.      Palpations: Abdomen is soft.   Musculoskeletal:         General: No swelling or tenderness. Normal range of motion.      Cervical back: Normal range of motion.      Right lower leg: No edema.      Left lower leg: No edema.   Skin:     General: Skin is warm and dry.      Capillary Refill: Capillary refill takes less than 2 seconds.   Neurological:      Mental Status: She is alert. Mental status is at baseline.   Psychiatric:         Mood and Affect: Mood normal.         Behavior: Behavior normal.             Lab Results   Component Value Date    WBC 9.86 11/04/2022    HGB 12.3 11/04/2022    HCT 36.5 (L) 11/04/2022    MCV 83.5 11/04/2022     11/04/2022          Sodium   Date Value Ref Range Status   11/04/2022 135 (L) 136 - 145 mmol/L Final     Potassium   Date Value Ref Range Status   11/04/2022 4.0 3.5 - 5.1 mmol/L Final     Chloride   Date Value Ref Range Status   11/04/2022 103 98 - 107 mmol/L Final     CO2   Date Value Ref Range Status   11/04/2022 22 21 - 32 mmol/L Final     Glucose   Date Value Ref Range Status   11/04/2022  103 74 - 106 mg/dL Final     BUN   Date Value Ref Range Status   11/04/2022 8 7 - 18 mg/dL Final     Creatinine   Date Value Ref Range Status   11/04/2022 0.59 0.55 - 1.02 mg/dL Final     Calcium   Date Value Ref Range Status   11/04/2022 8.5 8.5 - 10.1 mg/dL Final     Total Protein   Date Value Ref Range Status   03/07/2022 7.6 6.4 - 8.2 g/dL Final     Albumin   Date Value Ref Range Status   03/07/2022 3.2 (L) 3.5 - 5.0 g/dL Final     Bilirubin, Total   Date Value Ref Range Status   03/07/2022 0.3 0.0 - 1.2 mg/dL Final     Alk Phos   Date Value Ref Range Status   03/07/2022 65 37 - 98 U/L Final     AST   Date Value Ref Range Status   03/07/2022 15 15 - 37 U/L Final     ALT   Date Value Ref Range Status   03/07/2022 19 13 - 56 U/L Final     Anion Gap   Date Value Ref Range Status   11/04/2022 14 7 - 16 mmol/L Final     eGFR    Date Value Ref Range Status   07/24/2020 158       eGFR   Date Value Ref Range Status   03/12/2022 141 >=60 mL/min/1.73m² Final      CT Head Without Contrast  Narrative: EXAMINATION:  CT HEAD WITHOUT CONTRAST    CLINICAL HISTORY:  Migraine, unspecified, not intractable, without status migrainosusHeadache, classic migraine;    COMPARISON:  CT brain June 2, 2020    TECHNIQUE:  Multiple axial tomographic images of the brain were obtained without the use of intravenous contrast.    FINDINGS:  Midline structures are nondisplaced.  No convincing evidence of acute intracranial hemorrhage.  No convincing evidence of hydrocephalus.  Visualized paranasal sinuses and mastoid air cells are predominantly clear.  Impression: No convincing imaging evidence of acute intracranial abnormality.    The CT exam was performed using one or more of the following dose    reduction techniques- Automated exposure control, adjustment of the mA    and/or kV according to patient size, and/or use of iterative    reconstructed technique.    Point of Service: [facility]    Electronically signed  by: Mj Shay  Date:    08/11/2022  Time:    08:11     Procedures   Assessment and Plan (including Health Maintenance)      Problem List  Smart Sets  Document Outside HM   :    Plan:           Problem List Items Addressed This Visit          Neuro    Migraine headache    Relevant Medications    rizatriptan (MAXALT-MLT) 10 MG disintegrating tablet       Psychiatric    Anxiety    Current Assessment & Plan     Patient reports Trazodone has helped greatly with anxiety and has helped her sleep at night. She wishes to continue Trazodone at current dosage. Follow up in 3 months or as needed.          Relevant Medications    traZODone (DESYREL) 100 MG tablet       ENT    Acute otitis externa of both ears - Primary    Current Assessment & Plan     Ciprodex drops as ordered.      Reviewed pathology of current symptoms, medication side effects/risk/benefits/directions on taking medications, and worsening or persistent symptoms that require follow-up in the next 2-3 days. Use drops as prescribed. Tylenol/Ibuprofen prn pain as directed. Avoid getting water in ears while showering. Instructed on appropriate way to instill drops in ear. After swimming, always dry ears well May resume the otc swimmer's ear drops post swimming once infection clears. Monitor for any changes in hearing or drainage from ear.                Pulmonary    Mild intermittent asthma    Current Assessment & Plan     Stable on current meds. Continue Singulair and inhaler as ordered. Follow up in 3 months or as needed.          Relevant Medications    fluticasone-salmeterol diskus inhaler 250-50 mcg    montelukast (SINGULAIR) 10 mg tablet       Endocrine    Vitamin B12 deficiency    Current Assessment & Plan     Continue monthly B12 injections. Follow up in 3 months or as needed.          Relevant Medications    cyanocobalamin 1,000 mcg/mL injection    Hormone replacement therapy    Relevant Medications    estradioL (ESTRACE) 2 MG tablet    Vitamin D  deficiency    Current Assessment & Plan     Continue ergocalciferol as ordered. Follow up in 3 months or as needed.          Relevant Medications    ergocalciferol (ERGOCALCIFEROL) 50,000 unit Cap       The patient has no Health Maintenance topics of status Not Due    Future Appointments   Date Time Provider Department Center   6/14/2023  9:30 AM JOANA Schroeder [facility]        Health Maintenance Due   Topic Date Due    COVID-19 Vaccine (1) Never done    HPV Vaccines (1 - 2-dose series) Never done    TETANUS VACCINE  Never done        No follow-ups on file.     Signature:  Valeria Melendrez NP  [facility]  [address] [address]  [address]    Date of encounter: 12/1/22

## 2022-12-14 NOTE — ASSESSMENT & PLAN NOTE
Patient reports Trazodone has helped greatly with anxiety and has helped her sleep at night. She wishes to continue Trazodone at current dosage. Follow up in 3 months or as needed.

## 2022-12-14 NOTE — ASSESSMENT & PLAN NOTE
Phenergan and Toradol IM in clinic. Patient is accompanied by  who states he is driving her.    checked and is OK. No signs of aberrant behavior. Instructed patient to only take medication as instructed and never share medications with others.   Fioricet as needed for headache. Go home and rest.   Will make referral to Dr Grimes at [facility] .

## 2022-12-14 NOTE — PROGRESS NOTES
Valeria Melendrez NP   [facility]  [address]  [place]  [address]      PATIENT NAME: Yeny Acosta  : 1995  DATE: 22  MRN: [id number]      Billing Provider: Valeria Melendrez NP  Level of Service: MT OFFICE/OUTPT VISIT, EST, LEVL III, 20-29 MIN  Patient PCP Information       Provider PCP Type    Guille Parisi MD General            Reason for Visit / Chief Complaint: Referral (Pt wants a referral to neuro) and Migraine (X 1 week)       Update PCP  Update Chief Complaint         History of Present Illness / Problem Focused Workflow     Yeny Acosta presents to the clinic with Referral (Pt wants a referral to neuro) and Migraine (X 1 week)     27 year old female presents to clinic with complaints of persistent migraine for over a week now. She reports headache is accompanied by nausea.. She states she has tried OTC meds as well as her Emgality, Compazine, and Maxalt with no relief. She is requesting something to help current headache, as well as referral to neuro as she has been seeing [facility] and they are closing.     Review of Systems     @Review of Systems   Constitutional:  Negative for activity change and unexpected weight change.   HENT:  Negative for hearing loss, rhinorrhea and trouble swallowing.    Eyes:  Negative for discharge and visual disturbance.   Respiratory:  Negative for chest tightness and wheezing.    Cardiovascular:  Negative for chest pain and palpitations.   Gastrointestinal:  Positive for nausea. Negative for blood in stool, constipation, diarrhea and vomiting.   Endocrine: Negative for polydipsia and polyuria.   Genitourinary:  Negative for difficulty urinating, dysuria, hematuria and menstrual problem.   Musculoskeletal:  Negative for arthralgias and joint swelling.   Neurological:  Positive for headaches. Negative for weakness.   Psychiatric/Behavioral:  Negative for confusion and dysphoric mood.      Medical / Social / Family  History     Past Medical History:   Diagnosis Date    Cardiac murmur     Endometriosis     Migraine headache 11/13/2019    PCOS (polycystic ovarian syndrome)     Periumbilical hernia 08/20/2020    repaired by Dr. Dwyer on 10/07/2020    Retroflexion of uterus 07/31/2018    3rd degree       Past Surgical History:   Procedure Laterality Date    CHOLECYSTECTOMY  3-2022    Cyst removed from cervix      Cyst removed from ovary      DILATION AND CURETTAGE OF UTERUS      HERNIA REPAIR      HYSTERECTOMY      LAPAROSCOPIC CHOLECYSTECTOMY N/A 03/09/2022    Procedure: CHOLECYSTECTOMY, LAPAROSCOPIC;  Surgeon: Denny Dwyer MD;  Location: [facility];  Service: General;  Laterality: N/A;    LOOP ELECTROSURGICAL EXCISION PROCEDURE (LEEP)      Operative Laparoscopy with lysis of adhesions      RH/BSO with limited cystoscopy  09/24/2018    TONSILLECTOMY  2018       Social History  Ms.  reports that she has never smoked. She has been exposed to tobacco smoke. She has never used smokeless tobacco. She reports current alcohol use of about 4.0 standard drinks per week. She reports that she does not use drugs.    Family History  Ms.'s family history includes Breast cancer in an other family member; Diabetes in an other family member.    Medications and Allergies     Medications  Outpatient Medications Marked as Taking for the 12/14/22 encounter (Office Visit) with Valeria Melendrez NP   Medication Sig Dispense Refill    albuterol (PROVENTIL) 2.5 mg /3 mL (0.083 %) nebulizer solution Take 3 mLs (2.5 mg total) by nebulization every 4 to 6 hours as needed for Wheezing. 1 each 1    albuterol (PROVENTIL/VENTOLIN HFA) 90 mcg/actuation inhaler Inhale 1-2 puffs into the lungs every 6 (six) hours as needed for Wheezing. Rescue 18 g 1    ciprofloxacin-dexAMETHasone 0.3-0.1% (CIPRODEX) 0.3-0.1 % DrpS Place 4 drops into the left ear 2 (two) times daily. 7.5 mL 0    cyanocobalamin 1,000 mcg/mL injection cyanocobalamin (vit B-12) 1,000 mcg/mL  injection solution  INJECT 1 ML INTRAMUSCULARLY MONTHLY 1 mL 5    diclofenac potassium (ZIPSOR) 25 mg Cap Take 1 capsule by mouth 4 (four) times daily.      EMGALITY  mg/mL PnIj SMARTSI SUB-Q Once a Month      EPINEPHrine (EPIPEN) 0.3 mg/0.3 mL AtIn epinephrine 0.3 mg/0.3 mL injection, auto-injector  USE AS DIRECTED FOR ACUTE ALLERGIC REACTION Strength: 0.3 mg/0.3 mL 1 each 1    ergocalciferol (ERGOCALCIFEROL) 50,000 unit Cap Take 1 capsule (50,000 Units total) by mouth every 7 days. 4 capsule 2    estradioL (ESTRACE) 2 MG tablet Take 1 tablet (2 mg total) by mouth 2 (two) times a day. 60 tablet 2    fluticasone-salmeterol diskus inhaler 250-50 mcg Inhale 1 puff into the lungs daily as needed (wheezing). Controller 1 each 3    ibuprofen (ADVIL,MOTRIN) 800 MG tablet ibuprofen 800 mg tablet   TAKE 1 TABLET BY MOUTH EVERY 6 TO 8 HOURS AS NEEDED FOR PAIN      montelukast (SINGULAIR) 10 mg tablet montelukast 10 mg tablet   TAKE 1 TABLET BY MOUTH DAILY AT BEDTIME 30 tablet 3    NURTEC 75 mg odt Take 75 mg by mouth once as needed for Migraine.      omeprazole (PRILOSEC) 40 MG capsule Take 1 capsule (40 mg total) by mouth once daily. 30 capsule 11    ondansetron (ZOFRAN-ODT) 8 MG TbDL Take by mouth.      prochlorperazine (COMPAZINE) 10 MG tablet Take 10 mg by mouth 2 (two) times daily as needed.      rizatriptan (MAXALT-MLT) 10 MG disintegrating tablet Take by mouth.      tiZANidine (ZANAFLEX) 4 MG tablet TAKE 1 OR 2 TABLETS BY MOUTH AT BEDTIME AS NEEDED FOR NECK PAIN / SPASM      TOSYMRA 10 mg/actuation Spry 1 spray by Each Nostril route as needed for Migraine.      traZODone (DESYREL) 100 MG tablet Take 1 tablet (100 mg total) by mouth every evening. 30 tablet 3    triamcinolone acetonide 0.1% (KENALOG) 0.1 % ointment Apply to affected area 2 TIMES A DAY, NO LONGER THEN 1 WEEK 454 g 0     Current Facility-Administered Medications for the 22 encounter (Office Visit) with Valeria Melendrez NP   Medication  Dose Route Frequency Provider Last Rate Last Admin    [COMPLETED] ketorolac injection 30 mg  30 mg Intramuscular 1 time in Clinic/HOD Valeria Melendrez, NP   30 mg at 12/14/22 1056    [COMPLETED] promethazine injection 25 mg  25 mg Intramuscular 1 time in Clinic/HOD Valeria Melendrez NP   25 mg at 12/14/22 1057       Allergies  Review of patient's allergies indicates:   Allergen Reactions    Adhesive      Adhesive on hormone patch    Adhesive tape-silicones     Insect venom      Bee sting    Venom-honey bee        Physical Examination     Vitals:    12/14/22 1010   BP: 118/62   Pulse: 79   Resp: 18   Temp: 98.5 °F (36.9 °C)     Physical Exam  Vitals and nursing note reviewed.   Constitutional:       Appearance: She is obese.   HENT:      Head: Normocephalic.      Right Ear: Tympanic membrane normal.      Left Ear: Tympanic membrane normal.      Nose: Nose normal.      Mouth/Throat:      Mouth: Mucous membranes are moist.      Pharynx: Oropharynx is clear. No posterior oropharyngeal erythema.   Eyes:      Conjunctiva/sclera: Conjunctivae normal.   Cardiovascular:      Rate and Rhythm: Normal rate and regular rhythm.      Pulses: Normal pulses.      Heart sounds: Normal heart sounds.   Pulmonary:      Effort: Pulmonary effort is normal.      Breath sounds: Normal breath sounds.   Abdominal:      General: Abdomen is flat. Bowel sounds are normal. There is no distension.      Palpations: Abdomen is soft.   Musculoskeletal:         General: No swelling or tenderness. Normal range of motion.      Cervical back: Normal range of motion.      Right lower leg: No edema.      Left lower leg: No edema.   Skin:     General: Skin is warm and dry.      Capillary Refill: Capillary refill takes less than 2 seconds.   Neurological:      Mental Status: She is alert. Mental status is at baseline.   Psychiatric:         Mood and Affect: Mood normal.         Behavior: Behavior normal.             Lab Results   Component Value Date    WBC  9.86 11/04/2022    HGB 12.3 11/04/2022    HCT 36.5 (L) 11/04/2022    MCV 83.5 11/04/2022     11/04/2022          Sodium   Date Value Ref Range Status   11/04/2022 135 (L) 136 - 145 mmol/L Final     Potassium   Date Value Ref Range Status   11/04/2022 4.0 3.5 - 5.1 mmol/L Final     Chloride   Date Value Ref Range Status   11/04/2022 103 98 - 107 mmol/L Final     CO2   Date Value Ref Range Status   11/04/2022 22 21 - 32 mmol/L Final     Glucose   Date Value Ref Range Status   11/04/2022 103 74 - 106 mg/dL Final     BUN   Date Value Ref Range Status   11/04/2022 8 7 - 18 mg/dL Final     Creatinine   Date Value Ref Range Status   11/04/2022 0.59 0.55 - 1.02 mg/dL Final     Calcium   Date Value Ref Range Status   11/04/2022 8.5 8.5 - 10.1 mg/dL Final     Total Protein   Date Value Ref Range Status   03/07/2022 7.6 6.4 - 8.2 g/dL Final     Albumin   Date Value Ref Range Status   03/07/2022 3.2 (L) 3.5 - 5.0 g/dL Final     Bilirubin, Total   Date Value Ref Range Status   03/07/2022 0.3 0.0 - 1.2 mg/dL Final     Alk Phos   Date Value Ref Range Status   03/07/2022 65 37 - 98 U/L Final     AST   Date Value Ref Range Status   03/07/2022 15 15 - 37 U/L Final     ALT   Date Value Ref Range Status   03/07/2022 19 13 - 56 U/L Final     Anion Gap   Date Value Ref Range Status   11/04/2022 14 7 - 16 mmol/L Final     eGFR    Date Value Ref Range Status   07/24/2020 158       eGFR   Date Value Ref Range Status   03/12/2022 141 >=60 mL/min/1.73m² Final      CT Head Without Contrast  Narrative: EXAMINATION:  CT HEAD WITHOUT CONTRAST    CLINICAL HISTORY:  Migraine, unspecified, not intractable, without status migrainosusHeadache, classic migraine;    COMPARISON:  CT brain June 2, 2020    TECHNIQUE:  Multiple axial tomographic images of the brain were obtained without the use of intravenous contrast.    FINDINGS:  Midline structures are nondisplaced.  No convincing evidence of acute intracranial hemorrhage.  No  convincing evidence of hydrocephalus.  Visualized paranasal sinuses and mastoid air cells are predominantly clear.  Impression: No convincing imaging evidence of acute intracranial abnormality.    The CT exam was performed using one or more of the following dose    reduction techniques- Automated exposure control, adjustment of the mA    and/or kV according to patient size, and/or use of iterative    reconstructed technique.    Point of Service: [facility]    Electronically signed by: Mj Shay  Date:    08/11/2022  Time:    08:11     Procedures   Assessment and Plan (including Health Maintenance)      Problem List  Smart Sets  Document Outside HM   :    Plan:           Problem List Items Addressed This Visit          Neuro    Migraine headache    Current Assessment & Plan     Phenergan and Toradol IM in clinic. Patient is accompanied by  who states he is driving her.    checked and is OK. No signs of aberrant behavior. Instructed patient to only take medication as instructed and never share medications with others.   Fioricet as needed for headache. Go home and rest.   Will make referral to [facility] Grimes at [facility] .          Relevant Medications    EMGALITY  mg/mL PnIj    ondansetron (ZOFRAN-ODT) 8 MG TbDL    butalbital-acetaminophen-caffeine -40 mg (FIORICET, ESGIC) -40 mg per tablet    ketorolac injection 30 mg (Completed)    promethazine injection 25 mg (Completed)    Other Relevant Orders    Ambulatory referral/consult to Neurology       Endocrine    Vitamin B12 deficiency - Primary    Relevant Orders    Vitamin B12 & Folate    Vitamin D deficiency    Relevant Orders    Vitamin D       The patient has no Health Maintenance topics of status Not Due    Future Appointments   Date Time Provider Department Center   6/14/2023  9:30 AM JOANA Schroeder [facility] STEVEN Galo        Health Maintenance Due   Topic Date Due    COVID-19 Vaccine (1) Never done    HPV  Vaccines (1 - 2-dose series) Never done    TETANUS VACCINE  Never done        No follow-ups on file.     Signature:  Valeria Melendrez NP  [place] [id number]  [address]    Date of encounter: 12/14/22

## 2022-12-14 NOTE — ASSESSMENT & PLAN NOTE
Ciprodex drops as ordered.      Reviewed pathology of current symptoms, medication side effects/risk/benefits/directions on taking medications, and worsening or persistent symptoms that require follow-up in the next 2-3 days. Use drops as prescribed. Tylenol/Ibuprofen prn pain as directed. Avoid getting water in ears while showering. Instructed on appropriate way to instill drops in ear. After swimming, always dry ears well May resume the otc swimmer's ear drops post swimming once infection clears. Monitor for any changes in hearing or drainage from ear.

## 2022-12-16 ENCOUNTER — PATIENT MESSAGE (OUTPATIENT)
Dept: FAMILY MEDICINE | Facility: CLINIC | Age: 27
End: 2022-12-16
Payer: COMMERCIAL

## 2022-12-16 RX ORDER — DICYCLOMINE HYDROCHLORIDE 10 MG/1
10 CAPSULE ORAL
Qty: 120 CAPSULE | Refills: 0 | Status: SHIPPED | OUTPATIENT
Start: 2022-12-16 | End: 2023-01-05

## 2023-01-06 ENCOUNTER — OFFICE VISIT (OUTPATIENT)
Dept: FAMILY MEDICINE | Facility: CLINIC | Age: 28
End: 2023-01-06
Payer: COMMERCIAL

## 2023-01-06 VITALS
RESPIRATION RATE: 18 BRPM | HEART RATE: 75 BPM | OXYGEN SATURATION: 98 % | BODY MASS INDEX: 34.1 KG/M2 | TEMPERATURE: 98 F | HEIGHT: 68 IN | WEIGHT: 225 LBS | DIASTOLIC BLOOD PRESSURE: 84 MMHG | SYSTOLIC BLOOD PRESSURE: 118 MMHG

## 2023-01-06 DIAGNOSIS — F41.9 ANXIETY: ICD-10-CM

## 2023-01-06 DIAGNOSIS — G43.111 INTRACTABLE MIGRAINE WITH AURA WITH STATUS MIGRAINOSUS: Primary | ICD-10-CM

## 2023-01-06 PROCEDURE — 3008F BODY MASS INDEX DOCD: CPT | Mod: ,,, | Performed by: NURSE PRACTITIONER

## 2023-01-06 PROCEDURE — 99213 OFFICE O/P EST LOW 20 MIN: CPT | Mod: 25,,, | Performed by: NURSE PRACTITIONER

## 2023-01-06 PROCEDURE — 3008F PR BODY MASS INDEX (BMI) DOCUMENTED: ICD-10-PCS | Mod: ,,, | Performed by: NURSE PRACTITIONER

## 2023-01-06 PROCEDURE — 96372 PR INJECTION,THERAP/PROPH/DIAG2ST, IM OR SUBCUT: ICD-10-PCS | Mod: ,,, | Performed by: NURSE PRACTITIONER

## 2023-01-06 PROCEDURE — 3079F DIAST BP 80-89 MM HG: CPT | Mod: ,,, | Performed by: NURSE PRACTITIONER

## 2023-01-06 PROCEDURE — 3074F PR MOST RECENT SYSTOLIC BLOOD PRESSURE < 130 MM HG: ICD-10-PCS | Mod: ,,, | Performed by: NURSE PRACTITIONER

## 2023-01-06 PROCEDURE — 96372 THER/PROPH/DIAG INJ SC/IM: CPT | Mod: ,,, | Performed by: NURSE PRACTITIONER

## 2023-01-06 PROCEDURE — 3079F PR MOST RECENT DIASTOLIC BLOOD PRESSURE 80-89 MM HG: ICD-10-PCS | Mod: ,,, | Performed by: NURSE PRACTITIONER

## 2023-01-06 PROCEDURE — 3074F SYST BP LT 130 MM HG: CPT | Mod: ,,, | Performed by: NURSE PRACTITIONER

## 2023-01-06 PROCEDURE — 99213 PR OFFICE/OUTPT VISIT, EST, LEVL III, 20-29 MIN: ICD-10-PCS | Mod: 25,,, | Performed by: NURSE PRACTITIONER

## 2023-01-06 RX ORDER — KETOROLAC TROMETHAMINE 30 MG/ML
30 INJECTION, SOLUTION INTRAMUSCULAR; INTRAVENOUS
Status: COMPLETED | OUTPATIENT
Start: 2023-01-06 | End: 2023-01-06

## 2023-01-06 RX ORDER — BUSPIRONE HYDROCHLORIDE 10 MG/1
10 TABLET ORAL 3 TIMES DAILY
Qty: 90 TABLET | Refills: 3 | Status: SHIPPED | OUTPATIENT
Start: 2023-01-06 | End: 2023-06-29

## 2023-01-06 RX ORDER — PROMETHAZINE HYDROCHLORIDE 25 MG/ML
25 INJECTION, SOLUTION INTRAMUSCULAR; INTRAVENOUS
Status: COMPLETED | OUTPATIENT
Start: 2023-01-06 | End: 2023-01-06

## 2023-01-06 RX ORDER — GALCANEZUMAB 120 MG/ML
120 INJECTION, SOLUTION SUBCUTANEOUS
Qty: 1 ML | Refills: 1 | Status: SHIPPED | OUTPATIENT
Start: 2023-01-06 | End: 2023-06-13

## 2023-01-06 RX ORDER — PROMETHAZINE HYDROCHLORIDE 25 MG/1
25 TABLET ORAL EVERY 6 HOURS PRN
COMMUNITY
Start: 2022-12-15 | End: 2023-05-06

## 2023-01-06 RX ADMIN — PROMETHAZINE HYDROCHLORIDE 25 MG: 25 INJECTION, SOLUTION INTRAMUSCULAR; INTRAVENOUS at 03:01

## 2023-01-06 RX ADMIN — KETOROLAC TROMETHAMINE 30 MG: 30 INJECTION, SOLUTION INTRAMUSCULAR; INTRAVENOUS at 03:01

## 2023-01-06 NOTE — PROGRESS NOTES
Valeria Melendrez NP   [facility]  [address]  [place]  [address]      PATIENT NAME: Yeny Acosta  : 1995  DATE: 23  MRN: [id number]      Billing Provider: Valeria Melendrez NP  Level of Service: [place] OFFICE/OUTPT VISIT, EST, LEVL III, 20-29 MIN  Patient PCP Information       Provider PCP Type    Guille Parisi MD General            Reason for Visit / Chief Complaint: Headache (Pending appt w/ neuro./States shes hasnt heard from them yet.)       Update PCP  Update Chief Complaint         History of Present Illness / Problem Focused Workflow     Yeny Acosta presents to the clinic with Headache (Pending appt w/ neuro./States shes hasnt heard from them yet.)     Patient presents to clinic with complaints of severe migraine accompanied with nausea that started 2 days ago and nothing has helped. She states she took Maxalt last night Nurtec this morning no relief. Took Fioricet yesterday AM with little relief.   She has an appt pending with Neurology. [facility]. She was on Emgality pen for 2 years and it worked well. However, her insurance stopped paying for it. She tried Quilipta but did not work for her. She has tried Aimovig which worked at first but then stopped working and she was changed to emgality. Has tried Topamax in past.         Review of Systems     @Review of Systems   Constitutional:  Negative for activity change and unexpected weight change.   HENT:  Negative for hearing loss, rhinorrhea and trouble swallowing.    Eyes:  Negative for discharge and visual disturbance.   Respiratory:  Negative for chest tightness and wheezing.    Cardiovascular:  Negative for chest pain and palpitations.   Gastrointestinal:  Positive for nausea. Negative for blood in stool, constipation, diarrhea and vomiting.   Endocrine: Negative for polydipsia and polyuria.   Genitourinary:  Negative for difficulty urinating, dysuria,  hematuria and menstrual problem.   Musculoskeletal:  Positive for neck pain. Negative for arthralgias and joint swelling.   Neurological:  Positive for headaches. Negative for weakness.   Psychiatric/Behavioral:  Negative for confusion and dysphoric mood.      Medical / Social / Family History     Past Medical History:   Diagnosis Date    Cardiac murmur     Endometriosis     Migraine headache 11/13/2019    PCOS (polycystic ovarian syndrome)     Periumbilical hernia 08/20/2020    repaired by Dr. Dwyer on 10/07/2020    Retroflexion of uterus 07/31/2018    3rd degree       Past Surgical History:   Procedure Laterality Date    CHOLECYSTECTOMY  3-2022    Cyst removed from cervix      Cyst removed from ovary      DILATION AND CURETTAGE OF UTERUS      HERNIA REPAIR      HYSTERECTOMY      LAPAROSCOPIC CHOLECYSTECTOMY N/A 03/09/2022    Procedure: CHOLECYSTECTOMY, LAPAROSCOPIC;  Surgeon: Denny Dwyer MD;  Location: [facility];  Service: General;  Laterality: N/A;    LOOP ELECTROSURGICAL EXCISION PROCEDURE (LEEP)      Operative Laparoscopy with lysis of adhesions      RH/BSO with limited cystoscopy  09/24/2018    TONSILLECTOMY  2018       Social History  Ms.  reports that she has never smoked. She has been exposed to tobacco smoke. She has never used smokeless tobacco. She reports current alcohol use of about 4.0 standard drinks per week. She reports that she does not use drugs.    Family History  Ms.'s family history includes Breast cancer in an other family member; Diabetes in an other family member.    Medications and Allergies     Medications  Outpatient Medications Marked as Taking for the 1/6/23 encounter (Office Visit) with Valeria Melendrez NP   Medication Sig Dispense Refill    albuterol (PROVENTIL) 2.5 mg /3 mL (0.083 %) nebulizer solution Take 3 mLs (2.5 mg total) by nebulization every 4 to 6 hours as needed for Wheezing. 1 each 1    albuterol (PROVENTIL/VENTOLIN HFA) 90 mcg/actuation inhaler Inhale 1-2 puffs into  the lungs every 6 (six) hours as needed for Wheezing. Rescue 18 g 1    cyanocobalamin 1,000 mcg/mL injection cyanocobalamin (vit B-12) 1,000 mcg/mL injection solution  INJECT 1 ML INTRAMUSCULARLY MONTHLY 1 mL 5    dicyclomine (BENTYL) 10 MG capsule TAKE ONE CAPSULE BY MOUTH FOUR TIMES DAILY BEFORE MEALS & NIGHTLY 120 capsule 0    EMGALITY  mg/mL PnIj SMARTSI SUB-Q Once a Month      EPINEPHrine (EPIPEN) 0.3 mg/0.3 mL AtIn epinephrine 0.3 mg/0.3 mL injection, auto-injector  USE AS DIRECTED FOR ACUTE ALLERGIC REACTION Strength: 0.3 mg/0.3 mL 1 each 1    ergocalciferol (ERGOCALCIFEROL) 50,000 unit Cap Take 1 capsule (50,000 Units total) by mouth every 7 days. 4 capsule 2    estradioL (ESTRACE) 2 MG tablet Take 1 tablet (2 mg total) by mouth 2 (two) times a day. 60 tablet 2    fluticasone-salmeterol diskus inhaler 250-50 mcg Inhale 1 puff into the lungs daily as needed (wheezing). Controller 1 each 3    montelukast (SINGULAIR) 10 mg tablet montelukast 10 mg tablet   TAKE 1 TABLET BY MOUTH DAILY AT BEDTIME 30 tablet 3    NURTEC 75 mg odt Take 75 mg by mouth once as needed for Migraine.      omeprazole (PRILOSEC) 40 MG capsule Take 1 capsule (40 mg total) by mouth once daily. 30 capsule 11    ondansetron (ZOFRAN-ODT) 8 MG TbDL Take by mouth.      prochlorperazine (COMPAZINE) 10 MG tablet Take 10 mg by mouth 2 (two) times daily as needed.      promethazine (PHENERGAN) 25 MG tablet Take 25 mg by mouth every 6 (six) hours as needed.      rizatriptan (MAXALT-MLT) 10 MG disintegrating tablet Take by mouth.      traZODone (DESYREL) 100 MG tablet Take 1 tablet (100 mg total) by mouth every evening. 30 tablet 3    [DISCONTINUED] butalbital-acetaminophen-caffeine -40 mg (FIORICET, ESGIC) -40 mg per tablet Take 1 tablet by mouth every 4 (four) hours as needed for Pain. 30 tablet 0    [DISCONTINUED] TOSYMRA 10 mg/actuation Spry 1 spray by Each Nostril route as needed for Migraine.         Allergies  Review of  patient's allergies indicates:   Allergen Reactions    Adhesive      Adhesive on hormone patch    Insect venom      Bee sting       Physical Examination     Vitals:    01/06/23 1408   BP: 118/84   Pulse: 75   Resp: 18   Temp: 97.8 °F (36.6 °C)     Physical Exam  Vitals and nursing note reviewed.   HENT:      Head: Normocephalic.      Right Ear: Tympanic membrane normal.      Left Ear: Tympanic membrane normal.      Nose: Nose normal.      Mouth/Throat:      Mouth: Mucous membranes are moist.      Pharynx: Oropharynx is clear. No posterior oropharyngeal erythema.   Eyes:      Conjunctiva/sclera: Conjunctivae normal.   Cardiovascular:      Rate and Rhythm: Normal rate and regular rhythm.      Pulses: Normal pulses.      Heart sounds: Normal heart sounds.   Pulmonary:      Effort: Pulmonary effort is normal.      Breath sounds: Normal breath sounds.   Abdominal:      General: Abdomen is flat. Bowel sounds are normal. There is no distension.      Palpations: Abdomen is soft.   Musculoskeletal:         General: No swelling or tenderness. Normal range of motion.      Cervical back: Normal range of motion.      Right lower leg: No edema.      Left lower leg: No edema.   Skin:     General: Skin is warm and dry.      Capillary Refill: Capillary refill takes less than 2 seconds.   Neurological:      Mental Status: She is alert. Mental status is at baseline.   Psychiatric:         Mood and Affect: Mood normal.         Behavior: Behavior normal.             Lab Results   Component Value Date    WBC 9.86 11/04/2022    HGB 12.3 11/04/2022    HCT 36.5 (L) 11/04/2022    MCV 83.5 11/04/2022     11/04/2022          Sodium   Date Value Ref Range Status   11/04/2022 135 (L) 136 - 145 mmol/L Final     Potassium   Date Value Ref Range Status   11/04/2022 4.0 3.5 - 5.1 mmol/L Final     Chloride   Date Value Ref Range Status   11/04/2022 103 98 - 107 mmol/L Final     CO2   Date Value Ref Range Status   11/04/2022 22 21 - 32 mmol/L  Final     Glucose   Date Value Ref Range Status   11/04/2022 103 74 - 106 mg/dL Final     BUN   Date Value Ref Range Status   11/04/2022 8 7 - 18 mg/dL Final     Creatinine   Date Value Ref Range Status   11/04/2022 0.59 0.55 - 1.02 mg/dL Final     Calcium   Date Value Ref Range Status   11/04/2022 8.5 8.5 - 10.1 mg/dL Final     Total Protein   Date Value Ref Range Status   03/07/2022 7.6 6.4 - 8.2 g/dL Final     Albumin   Date Value Ref Range Status   03/07/2022 3.2 (L) 3.5 - 5.0 g/dL Final     Bilirubin, Total   Date Value Ref Range Status   03/07/2022 0.3 0.0 - 1.2 mg/dL Final     Alk Phos   Date Value Ref Range Status   03/07/2022 65 37 - 98 U/L Final     AST   Date Value Ref Range Status   03/07/2022 15 15 - 37 U/L Final     ALT   Date Value Ref Range Status   03/07/2022 19 13 - 56 U/L Final     Anion Gap   Date Value Ref Range Status   11/04/2022 14 7 - 16 mmol/L Final     eGFR    Date Value Ref Range Status   07/24/2020 158       eGFR   Date Value Ref Range Status   03/12/2022 141 >=60 mL/min/1.73m² Final      CT Head Without Contrast  Narrative: EXAMINATION:  CT HEAD WITHOUT CONTRAST    CLINICAL HISTORY:  Migraine, unspecified, not intractable, without status migrainosusHeadache, classic migraine;    COMPARISON:  CT brain June 2, 2020    TECHNIQUE:  Multiple axial tomographic images of the brain were obtained without the use of intravenous contrast.    FINDINGS:  Midline structures are nondisplaced.  No convincing evidence of acute intracranial hemorrhage.  No convincing evidence of hydrocephalus.  Visualized paranasal sinuses and mastoid air cells are predominantly clear.  Impression: No convincing imaging evidence of acute intracranial abnormality.    The CT exam was performed using one or more of the following dose    reduction techniques- Automated exposure control, adjustment of the mA    and/or kV according to patient size, and/or use of iterative    reconstructed technique.    Point of  Service: [facility]    Electronically signed by: Mj Shay  Date:    08/11/2022  Time:    08:11     Procedures   Assessment and Plan (including Health Maintenance)      Problem List  Smart Sets  Document Outside HM   :    Plan:           Problem List Items Addressed This Visit          Neuro    Migraine headache - Primary    Current Assessment & Plan     Toradol and Phenergan given IM in clinic. Patient is accompanied by  who states he is driving her. Go home and rest.   Will refill Emgality and see if we can do PA to get it pushed through.   Will refer to Dr Sebastian.          Relevant Medications    promethazine (PHENERGAN) 25 MG tablet    galcanezumab-gnlm (EMGALITY PEN) 120 mg/mL PnIj    Other Relevant Orders    Ambulatory referral/consult to Neurology       Psychiatric    Anxiety    Current Assessment & Plan     Patient reports anxiety well controlled. Request refill on Buspar. Continue at current dosage.             The patient has no Health Maintenance topics of status Not Due    Future Appointments   Date Time Provider Department Center   2/28/2023 11:00 AM JOANA Zhang [facility] NEUR[facility]   6/14/2023  9:30 AM JOANA Schroeder [facility]        Health Maintenance Due   Topic Date Due    COVID-19 Vaccine (1) Never done    HPV Vaccines (1 - 2-dose series) Never done    TETANUS VACCINE  Never done        Follow up in about 3 months (around 4/6/2023), or if symptoms worsen or fail to improve.     Signature:  Valeria Melendrez NP  [address] [place]  [address]    Date of encounter: 1/6/23

## 2023-01-06 NOTE — LETTER
January 6, 2023      [facility] [address]  [place] [address]  Phone: [phone]  Fax: [phone]       Patient: Yeny Acosta   YOB: 1995  Date of Visit: 01/06/2023    To Whom It May Concern:    Kaylin Acosta  was at [facility] on 01/06/2023. The patient may return to work/school on *** with no restrictions. If you have any questions or concerns, or if I can be of further assistance, please do not hesitate to contact me.    Sincerely,    Christine Staton LPN

## 2023-01-12 DIAGNOSIS — G43.111 INTRACTABLE MIGRAINE WITH AURA WITH STATUS MIGRAINOSUS: ICD-10-CM

## 2023-01-13 RX ORDER — BUTALBITAL, ACETAMINOPHEN AND CAFFEINE 50; 325; 40 MG/1; MG/1; MG/1
1 TABLET ORAL EVERY 4 HOURS PRN
Qty: 30 TABLET | Refills: 0 | Status: SHIPPED | OUTPATIENT
Start: 2023-01-13 | End: 2023-02-12

## 2023-01-14 ENCOUNTER — HOSPITAL ENCOUNTER (EMERGENCY)
Facility: HOSPITAL | Age: 28
Discharge: HOME | End: 2023-01-14
Payer: COMMERCIAL

## 2023-01-14 VITALS
HEART RATE: 63 BPM | TEMPERATURE: 98 F | WEIGHT: 223 LBS | OXYGEN SATURATION: 99 % | DIASTOLIC BLOOD PRESSURE: 75 MMHG | RESPIRATION RATE: 18 BRPM | SYSTOLIC BLOOD PRESSURE: 124 MMHG | BODY MASS INDEX: 33.91 KG/M2

## 2023-01-14 DIAGNOSIS — G43.109 MIGRAINE WITH AURA AND WITHOUT STATUS MIGRAINOSUS, NOT INTRACTABLE: Primary | ICD-10-CM

## 2023-01-14 DIAGNOSIS — G43.909 MIGRAINE WITHOUT STATUS MIGRAINOSUS, NOT INTRACTABLE, UNSPECIFIED MIGRAINE TYPE: ICD-10-CM

## 2023-01-14 PROCEDURE — 63600175 PHARM REV CODE 636 W HCPCS: Performed by: NURSE PRACTITIONER

## 2023-01-14 PROCEDURE — 99285 PR EMERGENCY DEPT VISIT,LEVEL V: ICD-10-PCS | Mod: ,,, | Performed by: NURSE PRACTITIONER

## 2023-01-14 PROCEDURE — 99284 EMERGENCY DEPT VISIT MOD MDM: CPT

## 2023-01-14 PROCEDURE — 96372 THER/PROPH/DIAG INJ SC/IM: CPT | Performed by: NURSE PRACTITIONER

## 2023-01-14 PROCEDURE — 99285 EMERGENCY DEPT VISIT HI MDM: CPT | Mod: ,,, | Performed by: NURSE PRACTITIONER

## 2023-01-14 RX ORDER — PROMETHAZINE HYDROCHLORIDE 25 MG/ML
25 INJECTION, SOLUTION INTRAMUSCULAR; INTRAVENOUS
Status: COMPLETED | OUTPATIENT
Start: 2023-01-14 | End: 2023-01-14

## 2023-01-14 RX ORDER — FENTANYL CITRATE 50 UG/ML
100 INJECTION, SOLUTION INTRAMUSCULAR; INTRAVENOUS
Status: COMPLETED | OUTPATIENT
Start: 2023-01-14 | End: 2023-01-14

## 2023-01-14 RX ORDER — SUMATRIPTAN 10 MG/1
1 SPRAY NASAL
Qty: 6 EACH | Refills: 0 | Status: SHIPPED | OUTPATIENT
Start: 2023-01-14 | End: 2023-06-13

## 2023-01-14 RX ADMIN — FENTANYL CITRATE 100 MCG: 50 INJECTION, SOLUTION INTRAMUSCULAR; INTRAVENOUS at 11:01

## 2023-01-14 RX ADMIN — PROMETHAZINE HYDROCHLORIDE 25 MG: 25 INJECTION INTRAMUSCULAR; INTRAVENOUS at 11:01

## 2023-01-15 NOTE — ED TRIAGE NOTES
Patient presents with migraine since Tuesday. She was seeing the headache center but they're closed and shes waiting for an appt for neuro.. She has flare up meds but not maintanence. She also has chiari malformation.

## 2023-01-15 NOTE — ED PROVIDER NOTES
Encounter Date: 1/14/2023       History     Chief Complaint   Patient presents with    Migraine     27 year old female presents to ED with complaint of migraine. She states migraine has been ongoing since Tuesday, with no relief of symptoms with PRN Esgic or TOSYMRA. She endorses sensitivity to loud sounds; nausea without vomiting. Endorses dizziness with rapid positional changes. Denies fever, chills, chest pain, cough, shortness of breath.     The history is provided by the patient. No  was used.   Migraine  This is a recurrent problem. The current episode started more than 2 days ago. The problem occurs daily. The problem has not changed since onset.Associated symptoms include headaches. Pertinent negatives include no chest pain and no shortness of breath. Nothing relieves the symptoms. Treatments tried: Esgic, emgality. The treatment provided no relief.   Review of patient's allergies indicates:   Allergen Reactions    Adhesive      Adhesive on hormone patch    Insect venom      Bee sting     Past Medical History:   Diagnosis Date    Cardiac murmur     Endometriosis     Migraine headache 11/13/2019    PCOS (polycystic ovarian syndrome)     Periumbilical hernia 08/20/2020    repaired by Dr. Dwyer on 10/07/2020    Retroflexion of uterus 07/31/2018    3rd degree     Past Surgical History:   Procedure Laterality Date    CHOLECYSTECTOMY  3-2022    Cyst removed from cervix      Cyst removed from ovary      DILATION AND CURETTAGE OF UTERUS      HERNIA REPAIR      HYSTERECTOMY      LAPAROSCOPIC CHOLECYSTECTOMY N/A 03/09/2022    Procedure: CHOLECYSTECTOMY, LAPAROSCOPIC;  Surgeon: Denny Dwyer MD;  Location: [facility] [place];  Service: General;  Laterality: N/A;    LOOP ELECTROSURGICAL EXCISION PROCEDURE (LEEP)      Operative Laparoscopy with lysis of adhesions      RH/BSO with limited cystoscopy  09/24/2018    TONSILLECTOMY  2018     Family History   Problem Relation Age of Onset    Breast cancer Other      Diabetes Other      Social History     Tobacco Use    Smoking status: Never     Passive exposure: Current    Smokeless tobacco: Never   Substance Use Topics    Alcohol use: Yes     Alcohol/week: 4.0 standard drinks     Types: 2 Glasses of wine, 2 Shots of liquor per week    Drug use: Never     Review of Systems   Constitutional:  Negative for chills, fatigue and fever.   HENT:  Negative for sinus pressure and sinus pain.    Eyes:  Negative for photophobia and visual disturbance.   Respiratory:  Negative for cough and shortness of breath.    Cardiovascular:  Negative for chest pain and palpitations.   Gastrointestinal:  Positive for nausea. Negative for vomiting.   Endocrine: Negative for cold intolerance and heat intolerance.   Genitourinary:  Negative for difficulty urinating and dysuria.   Musculoskeletal:  Negative for arthralgias and gait problem.   Skin:  Negative for color change and wound.   Allergic/Immunologic: Negative for environmental allergies and food allergies.   Neurological:  Positive for dizziness and headaches. Negative for weakness.   Hematological:  Negative for adenopathy. Does not bruise/bleed easily.   Psychiatric/Behavioral:  Negative for agitation and confusion.      Physical Exam     Initial Vitals [01/14/23 2230]   BP Pulse Resp Temp SpO2   124/75 63 18 98.3 °F (36.8 °C) 99 %      MAP       --         Physical Exam    Nursing note and vitals reviewed.  Constitutional: She appears well-developed and well-nourished.   HENT:   Head: Normocephalic and atraumatic.   Eyes: EOM are normal. Pupils are equal, round, and reactive to light.   Neck: Neck supple.   Normal range of motion.  Cardiovascular:  Normal rate and regular rhythm.           No murmur heard.  Pulmonary/Chest: She has no wheezes. She has no rhonchi.   Abdominal: Abdomen is soft. She exhibits no distension. There is no abdominal tenderness.   Musculoskeletal:         General: No tenderness or edema.      Cervical back: Normal  range of motion and neck supple.     Neurological: She is alert and oriented to person, place, and time.   Skin: Skin is warm and dry. Capillary refill takes less than 2 seconds.   Psychiatric: Her affect is blunt. She is withdrawn.       Medical Screening Exam   See Full Note    ED Course   Procedures  Labs Reviewed - No data to display       Imaging Results    None          Medications   fentaNYL injection 100 mcg (100 mcg Nasal Given 1/14/23 2314)   promethazine injection 25 mg (25 mg Intramuscular Given 1/14/23 2314)     Medical Decision Making:   Initial Assessment:   Migraine headache  Differential Diagnosis:   Migraine with aura  ED Management:  [facility]    Patient presents for emergent evaluation of acute migraine that poses a threat to life and/or bodily function.    In the ED patient found to have acute migraine with aura without status migrainous; not intractable.      Discharge [facility]  Patient was managed in the ED with IM phenergan; Intranasal Fentanyl  The response to treatment was good.    Patient was discharged in stable condition.  Detailed return precautions discussed..                   Clinical Impression:   Final diagnoses:  [G43.109] Migraine with aura and without status migrainosus, not intractable (Primary)        ED Disposition Condition    Discharge Stable          ED Prescriptions       Medication Sig Dispense Start Date End Date Auth. Provider    TOSYMRA 10 mg/actuation Spry 1 spray by Each Nostril route as needed (migraine). 6 each 1/14/2023 -- JOANA Gillette          Follow-up Information    None          JOANA Gillette  01/14/23 1170

## 2023-01-18 ENCOUNTER — TELEPHONE (OUTPATIENT)
Dept: EMERGENCY MEDICINE | Facility: HOSPITAL | Age: 28
End: 2023-01-18
Payer: COMMERCIAL

## 2023-02-08 NOTE — ASSESSMENT & PLAN NOTE
Toradol and Phenergan given IM in clinic. Patient is accompanied by  who states he is driving her. Go home and rest.   Will refill Emgality and see if we can do PA to get it pushed through.   Will refer to Dr Sebastian.

## 2023-03-02 DIAGNOSIS — Z79.890 HORMONE REPLACEMENT THERAPY: ICD-10-CM

## 2023-03-02 RX ORDER — ESTRADIOL 2 MG/1
2 TABLET ORAL 2 TIMES DAILY
Qty: 60 TABLET | Refills: 2 | Status: SHIPPED | OUTPATIENT
Start: 2023-03-02 | End: 2023-06-19

## 2023-03-05 DIAGNOSIS — E55.9 VITAMIN D DEFICIENCY: ICD-10-CM

## 2023-03-08 ENCOUNTER — PATIENT MESSAGE (OUTPATIENT)
Dept: FAMILY MEDICINE | Facility: CLINIC | Age: 28
End: 2023-03-08
Payer: COMMERCIAL

## 2023-03-08 DIAGNOSIS — R10.9 STOMACH CRAMPS: Primary | ICD-10-CM

## 2023-03-08 RX ORDER — DICYCLOMINE HYDROCHLORIDE 10 MG/1
CAPSULE ORAL
Qty: 120 CAPSULE | Refills: 2 | OUTPATIENT
Start: 2023-03-08

## 2023-03-08 RX ORDER — DICYCLOMINE HYDROCHLORIDE 10 MG/1
CAPSULE ORAL
Qty: 120 CAPSULE | Refills: 2 | Status: SHIPPED | OUTPATIENT
Start: 2023-03-08 | End: 2023-06-13 | Stop reason: SDUPTHER

## 2023-03-08 RX ORDER — ERGOCALCIFEROL 1.25 MG/1
50000 CAPSULE ORAL
Qty: 4 CAPSULE | Refills: 2 | OUTPATIENT
Start: 2023-03-08

## 2023-03-20 DIAGNOSIS — E55.9 VITAMIN D DEFICIENCY: ICD-10-CM

## 2023-03-20 DIAGNOSIS — E53.8 VITAMIN B12 DEFICIENCY: Primary | ICD-10-CM

## 2023-03-24 ENCOUNTER — TELEPHONE (OUTPATIENT)
Dept: FAMILY MEDICINE | Facility: CLINIC | Age: 28
End: 2023-03-24
Payer: COMMERCIAL

## 2023-03-24 NOTE — TELEPHONE ENCOUNTER
I called patient to let her know PA for Emgality had been denied and asked if she was still taking it. She reports she is taking it for now until she can get botox injections for her migraines. I told her she may can try to see if her neurologist can get it covered for her. I told her sometimes if it is sent in by a specialist, insurance will cover it. She said she would try that.

## 2023-03-31 ENCOUNTER — OFFICE VISIT (OUTPATIENT)
Dept: FAMILY MEDICINE | Facility: CLINIC | Age: 28
End: 2023-03-31
Payer: COMMERCIAL

## 2023-03-31 VITALS
RESPIRATION RATE: 18 BRPM | TEMPERATURE: 98 F | DIASTOLIC BLOOD PRESSURE: 80 MMHG | OXYGEN SATURATION: 99 % | WEIGHT: 225 LBS | BODY MASS INDEX: 34.1 KG/M2 | HEART RATE: 61 BPM | SYSTOLIC BLOOD PRESSURE: 122 MMHG | HEIGHT: 68 IN

## 2023-03-31 DIAGNOSIS — G43.909 MIGRAINE WITHOUT STATUS MIGRAINOSUS, NOT INTRACTABLE, UNSPECIFIED MIGRAINE TYPE: ICD-10-CM

## 2023-03-31 PROBLEM — H60.503 ACUTE OTITIS EXTERNA OF BOTH EARS: Status: RESOLVED | Noted: 2022-12-14 | Resolved: 2023-03-31

## 2023-03-31 PROCEDURE — 1159F MED LIST DOCD IN RCRD: CPT | Mod: ,,, | Performed by: NURSE PRACTITIONER

## 2023-03-31 PROCEDURE — 99213 OFFICE O/P EST LOW 20 MIN: CPT | Mod: 25,,, | Performed by: NURSE PRACTITIONER

## 2023-03-31 PROCEDURE — 96372 THER/PROPH/DIAG INJ SC/IM: CPT | Mod: ,,, | Performed by: NURSE PRACTITIONER

## 2023-03-31 PROCEDURE — 96372 PR INJECTION,THERAP/PROPH/DIAG2ST, IM OR SUBCUT: ICD-10-PCS | Mod: ,,, | Performed by: NURSE PRACTITIONER

## 2023-03-31 PROCEDURE — 3074F PR MOST RECENT SYSTOLIC BLOOD PRESSURE < 130 MM HG: ICD-10-PCS | Mod: ,,, | Performed by: NURSE PRACTITIONER

## 2023-03-31 PROCEDURE — 3008F PR BODY MASS INDEX (BMI) DOCUMENTED: ICD-10-PCS | Mod: ,,, | Performed by: NURSE PRACTITIONER

## 2023-03-31 PROCEDURE — 1159F PR MEDICATION LIST DOCUMENTED IN MEDICAL RECORD: ICD-10-PCS | Mod: ,,, | Performed by: NURSE PRACTITIONER

## 2023-03-31 PROCEDURE — 3008F BODY MASS INDEX DOCD: CPT | Mod: ,,, | Performed by: NURSE PRACTITIONER

## 2023-03-31 PROCEDURE — 3079F DIAST BP 80-89 MM HG: CPT | Mod: ,,, | Performed by: NURSE PRACTITIONER

## 2023-03-31 PROCEDURE — 99213 PR OFFICE/OUTPT VISIT, EST, LEVL III, 20-29 MIN: ICD-10-PCS | Mod: 25,,, | Performed by: NURSE PRACTITIONER

## 2023-03-31 PROCEDURE — 3074F SYST BP LT 130 MM HG: CPT | Mod: ,,, | Performed by: NURSE PRACTITIONER

## 2023-03-31 PROCEDURE — 3079F PR MOST RECENT DIASTOLIC BLOOD PRESSURE 80-89 MM HG: ICD-10-PCS | Mod: ,,, | Performed by: NURSE PRACTITIONER

## 2023-03-31 RX ORDER — AMITRIPTYLINE HYDROCHLORIDE 10 MG/1
10 TABLET, FILM COATED ORAL NIGHTLY
COMMUNITY
Start: 2023-02-28 | End: 2023-06-13

## 2023-03-31 RX ORDER — KETOROLAC TROMETHAMINE 30 MG/ML
30 INJECTION, SOLUTION INTRAMUSCULAR; INTRAVENOUS
Status: COMPLETED | OUTPATIENT
Start: 2023-03-31 | End: 2023-03-31

## 2023-03-31 RX ORDER — PROMETHAZINE HYDROCHLORIDE 25 MG/ML
25 INJECTION, SOLUTION INTRAMUSCULAR; INTRAVENOUS
Status: COMPLETED | OUTPATIENT
Start: 2023-03-31 | End: 2023-03-31

## 2023-03-31 RX ADMIN — KETOROLAC TROMETHAMINE 30 MG: 30 INJECTION, SOLUTION INTRAMUSCULAR; INTRAVENOUS at 03:03

## 2023-03-31 RX ADMIN — PROMETHAZINE HYDROCHLORIDE 25 MG: 25 INJECTION, SOLUTION INTRAMUSCULAR; INTRAVENOUS at 03:03

## 2023-03-31 NOTE — ASSESSMENT & PLAN NOTE
Toradol and Phenergan given IM in clinic. Patient is accompanied by  who states he is driving her. Go home and rest.   Follow up with Neurology as scheduled.

## 2023-03-31 NOTE — PROGRESS NOTES
Valeria Melendrez NP   [place] [id number] [facility] [address]  [place]  [address]      PATIENT NAME: Yeny Beaver  : 1995  DATE: 3/31/23  MRN: [id number]      Billing Provider: Valeria Melendrez NP  Level of Service: [place] OFFICE/OUTPT VISIT, EST, LEVL III, 20-29 MIN  Patient PCP Information       Provider PCP Type    Guille Parisi MD General            Reason for Visit / Chief Complaint: Headache (Started Tuesday./Has Chronic migraines./Sees neuro for botox inj Tuesday.) and Nausea         History of Present Illness / Problem Focused Workflow     Yeny Beaver presents to the clinic with Headache (Started Tuesday./Has Chronic migraines./Sees neuro for botox inj Tuesday.) and Nausea     27 year old female presents to clinic for migraine which started Tuesday and has been unrelieved with medications at home. She states pain is worsened by lights and loud noises. She is followed by Neurology for her migraines and reports she is to receive Botox injections on Tuesday to hopefully help.       Review of Systems     @Review of Systems   Constitutional:  Negative for activity change, appetite change, fatigue, fever and unexpected weight change.   HENT:  Negative for nasal congestion, ear pain, hearing loss, rhinorrhea, sinus pressure/congestion, sore throat and trouble swallowing.    Eyes:  Negative for pain, discharge, redness, visual disturbance and eye dryness.   Respiratory:  Negative for cough, chest tightness, shortness of breath and wheezing.    Cardiovascular:  Negative for chest pain, palpitations and leg swelling.   Gastrointestinal:  Positive for nausea and vomiting. Negative for abdominal distention, abdominal pain, blood in stool, constipation and diarrhea.   Endocrine: Negative for cold intolerance, heat intolerance, polydipsia and polyuria.   Genitourinary:  Negative for bladder incontinence, difficulty urinating, dysuria, frequency, hematuria, menstrual problem and urgency.    Musculoskeletal:  Positive for neck pain. Negative for arthralgias, gait problem and myalgias.   Integumentary:  Negative for color change, rash and wound.   Allergic/Immunologic: Negative for environmental allergies and food allergies.   Neurological:  Positive for weakness and headaches. Negative for dizziness and light-headedness.   Psychiatric/Behavioral:  Negative for behavioral problems, confusion, dysphoric mood and sleep disturbance.      Medical / Social / Family History     Past Medical History:   Diagnosis Date    Cardiac murmur     Endometriosis     Migraine headache 11/13/2019    PCOS (polycystic ovarian syndrome)     Periumbilical hernia 08/20/2020    repaired by Dr. Dwyer on 10/07/2020    Retroflexion of uterus 07/31/2018    3rd degree       Past Surgical History:   Procedure Laterality Date    CHOLECYSTECTOMY  3-2022    Cyst removed from cervix      Cyst removed from ovary      DILATION AND CURETTAGE OF UTERUS      HERNIA REPAIR      HYSTERECTOMY      LAPAROSCOPIC CHOLECYSTECTOMY N/A 03/09/2022    Procedure: CHOLECYSTECTOMY, LAPAROSCOPIC;  Surgeon: Denny Dwyer MD;  Location: [facility];  Service: General;  Laterality: N/A;    LOOP ELECTROSURGICAL EXCISION PROCEDURE (LEEP)      Operative Laparoscopy with lysis of adhesions      RH/BSO with limited cystoscopy  09/24/2018    TONSILLECTOMY  2018       Social History  Ms.  reports that she has never smoked. She has been exposed to tobacco smoke. She has never used smokeless tobacco. She reports current alcohol use of about 4.0 standard drinks per week. She reports that she does not use drugs.    Family History  Ms.'s family history includes Breast cancer in an other family member; Diabetes in an other family member.    Medications and Allergies     Medications  Outpatient Medications Marked as Taking for the 3/31/23 encounter (Office Visit) with Valeria Melendrez NP   Medication Sig Dispense Refill    albuterol (PROVENTIL) 2.5 mg /3  mL (0.083 %) nebulizer solution Take 3 mLs (2.5 mg total) by nebulization every 4 to 6 hours as needed for Wheezing. 1 each 1    albuterol (PROVENTIL/VENTOLIN HFA) 90 mcg/actuation inhaler Inhale 1-2 puffs into the lungs every 6 (six) hours as needed for Wheezing. Rescue 18 g 1    amitriptyline (ELAVIL) 10 MG tablet Take 10 mg by mouth every evening.      busPIRone (BUSPAR) 10 MG tablet Take 1 tablet (10 mg total) by mouth 3 (three) times daily. 90 tablet 3    cyanocobalamin 1,000 mcg/mL injection cyanocobalamin (vit B-12) 1,000 mcg/mL injection solution  INJECT 1 ML INTRAMUSCULARLY MONTHLY 1 mL 5    diclofenac potassium (ZIPSOR) 25 mg Cap Take 1 capsule by mouth 4 (four) times daily.      dicyclomine (BENTYL) 10 MG capsule TAKE ONE CAPSULE BY MOUTH FOUR TIMES DAILY BEFORE MEALS & NIGHTLY 120 capsule 2    EMGALITY  mg/mL PnIj SMARTSI SUB-Q Once a Month      EPINEPHrine (EPIPEN) 0.3 mg/0.3 mL AtIn epinephrine 0.3 mg/0.3 mL injection, auto-injector  USE AS DIRECTED FOR ACUTE ALLERGIC REACTION Strength: 0.3 mg/0.3 mL 1 each 1    ergocalciferol (ERGOCALCIFEROL) 50,000 unit Cap Take 1 capsule (50,000 Units total) by mouth every 7 days. 4 capsule 2    estradioL (ESTRACE) 2 MG tablet Take 1 tablet (2 mg total) by mouth 2 (two) times a day. 60 tablet 2    fluticasone-salmeterol diskus inhaler 250-50 mcg Inhale 1 puff into the lungs daily as needed (wheezing). Controller 1 each 3    galcanezumab-gnlm (EMGALITY PEN) 120 mg/mL PnIj Inject 120 mg into the skin every 28 days. 1 mL 1    ibuprofen (ADVIL,MOTRIN) 800 MG tablet ibuprofen 800 mg tablet   TAKE 1 TABLET BY MOUTH EVERY 6 TO 8 HOURS AS NEEDED FOR PAIN      montelukast (SINGULAIR) 10 mg tablet montelukast 10 mg tablet   TAKE 1 TABLET BY MOUTH DAILY AT BEDTIME 30 tablet 3    NURTEC 75 mg odt Take 75 mg by mouth once as needed for Migraine.      omeprazole (PRILOSEC) 40 MG capsule Take 1 capsule (40 mg total) by mouth once daily. 30 capsule 11     ondansetron (ZOFRAN-ODT) 8 MG TbDL Take by mouth.      prochlorperazine (COMPAZINE) 10 MG tablet Take 10 mg by mouth 2 (two) times daily as needed.      promethazine (PHENERGAN) 25 MG tablet Take 25 mg by mouth every 6 (six) hours as needed.      rizatriptan (MAXALT-MLT) 10 MG disintegrating tablet Take by mouth.      tiZANidine (ZANAFLEX) 4 MG tablet TAKE 1 OR 2 TABLETS BY MOUTH AT BEDTIME AS NEEDED FOR NECK PAIN / SPASM      TOSYMRA 10 mg/actuation Spry 1 spray by Each Nostril route as needed (migraine). 6 each 0    traZODone (DESYREL) 100 MG tablet Take 1 tablet (100 mg total) by mouth every evening. 30 tablet 3    triamcinolone acetonide 0.1% (KENALOG) 0.1 % ointment Apply to affected area 2 TIMES A DAY, NO LONGER THEN 1 WEEK 454 g 0     Current Facility-Administered Medications for the 3/31/23 encounter (Office Visit) with Valeria Melendrez NP   Medication Dose Route Frequency Provider Last Rate Last Admin    [COMPLETED] ketorolac injection 30 mg  30 mg Intramuscular 1 time in Clinic/HOD Valeria Melendrez NP   30 mg at 03/31/23 1516    [COMPLETED] promethazine injection 25 mg  25 mg Intramuscular 1 time in Clinic/HOD Valeria Melendrez NP   25 mg at 03/31/23 1517       Allergies  Review of patient's allergies indicates:   Allergen Reactions    Adhesive      Adhesive on hormone patch    Allergen ext-venom-honey bee Other (See Comments)    Insect venom      Bee sting       Physical Examination     Vitals:    03/31/23 1446   BP: 122/80   Pulse: 61   Resp: 18   Temp: 97.5 °F (36.4 °C)     Physical Exam  Vitals and nursing note reviewed.   Constitutional:       Appearance: She is ill-appearing.   HENT:      Head: Normocephalic.      Right Ear: Tympanic membrane normal.      Left Ear: Tympanic membrane normal.      Nose: Nose normal.      Mouth/Throat:      Mouth: Mucous membranes are moist.      Pharynx: Oropharynx is clear. No posterior oropharyngeal erythema.   Eyes:      Conjunctiva/sclera:  Conjunctivae normal.   Cardiovascular:      Rate and Rhythm: Normal rate and regular rhythm.      Pulses: Normal pulses.      Heart sounds: Normal heart sounds.   Pulmonary:      Effort: Pulmonary effort is normal.      Breath sounds: Normal breath sounds.   Abdominal:      General: Abdomen is flat. Bowel sounds are normal. There is no distension.      Palpations: Abdomen is soft.   Musculoskeletal:         General: No swelling or tenderness. Normal range of motion.      Cervical back: Normal range of motion.      Right lower leg: No edema.      Left lower leg: No edema.   Skin:     General: Skin is warm and dry.      Capillary Refill: Capillary refill takes less than 2 seconds.   Neurological:      Mental Status: She is alert. Mental status is at baseline.   Psychiatric:         Mood and Affect: Mood normal.         Behavior: Behavior normal.             Lab Results   Component Value Date    WBC 9.86 11/04/2022    HGB 12.3 11/04/2022    HCT 36.5 (L) 11/04/2022    MCV 83.5 11/04/2022     11/04/2022        CMP  Sodium   Date Value Ref Range Status   11/04/2022 135 (L) 136 - 145 mmol/L Final     Potassium   Date Value Ref Range Status   11/04/2022 4.0 3.5 - 5.1 mmol/L Final     Chloride   Date Value Ref Range Status   11/04/2022 103 98 - 107 mmol/L Final     CO2   Date Value Ref Range Status   11/04/2022 22 21 - 32 mmol/L Final     Glucose   Date Value Ref Range Status   11/04/2022 103 74 - 106 mg/dL Final     BUN   Date Value Ref Range Status   11/04/2022 8 7 - 18 mg/dL Final     Creatinine   Date Value Ref Range Status   11/04/2022 0.59 0.55 - 1.02 mg/dL Final     Calcium   Date Value Ref Range Status   11/04/2022 8.5 8.5 - 10.1 mg/dL Final     Total Protein   Date Value Ref Range Status   03/07/2022 7.6 6.4 - 8.2 g/dL Final     Albumin   Date Value Ref Range Status   03/07/2022 3.2 (L) 3.5 - 5.0 g/dL Final     Bilirubin, Total   Date Value Ref Range Status   03/07/2022 0.3 0.0 - 1.2 mg/dL Final     Alk Phos    Date Value Ref Range Status   03/07/2022 65 37 - 98 U/L Final     AST   Date Value Ref Range Status   03/07/2022 15 15 - 37 U/L Final     ALT   Date Value Ref Range Status   03/07/2022 19 13 - 56 U/L Final     Anion Gap   Date Value Ref Range Status   11/04/2022 14 7 - 16 mmol/L Final     eGFR   Date Value Ref Range Status   11/04/2022 129 >=60 mL/min/1.73m² Final     Procedures   Assessment and Plan (including Health Maintenance)   :    Plan:           Problem List Items Addressed This Visit          Neuro    Migraine headache    Current Assessment & Plan     Toradol and Phenergan given IM in clinic. Patient is accompanied by  who states he is driving her. Go home and rest.   Follow up with Neurology as scheduled.             The patient has no Health Maintenance topics of status Not Due    Future Appointments   Date Time Provider Department Center   6/14/2023  9:30 AM JOANA [facility]        Health Maintenance Due   Topic Date Due    COVID-19 Vaccine (1) Never done    HPV Vaccines (1 - 2-dose series) Never done    TETANUS VACCINE  Never done        No follow-ups on file.     Signature:  Valeria Melendrez NP  [facility]  [address] [place]  [address]    Date of encounter: 3/31/23

## 2023-05-06 ENCOUNTER — HOSPITAL ENCOUNTER (EMERGENCY)
Facility: HOSPITAL | Age: 28
Discharge: HOME | End: 2023-05-06
Payer: COMMERCIAL

## 2023-05-06 VITALS
HEIGHT: 68 IN | SYSTOLIC BLOOD PRESSURE: 122 MMHG | HEART RATE: 66 BPM | TEMPERATURE: 98 F | RESPIRATION RATE: 19 BRPM | WEIGHT: 230 LBS | OXYGEN SATURATION: 100 % | DIASTOLIC BLOOD PRESSURE: 77 MMHG | BODY MASS INDEX: 34.86 KG/M2

## 2023-05-06 DIAGNOSIS — H53.143 PHOTOPHOBIA, BILATERAL: ICD-10-CM

## 2023-05-06 DIAGNOSIS — G43.911 INTRACTABLE MIGRAINE WITH STATUS MIGRAINOSUS, UNSPECIFIED MIGRAINE TYPE: Primary | ICD-10-CM

## 2023-05-06 DIAGNOSIS — R11.0 NAUSEA: ICD-10-CM

## 2023-05-06 PROCEDURE — 99284 EMERGENCY DEPT VISIT MOD MDM: CPT

## 2023-05-06 PROCEDURE — 99284 PR EMERGENCY DEPT VISIT,LEVEL IV: ICD-10-PCS | Mod: ,,, | Performed by: NURSE PRACTITIONER

## 2023-05-06 PROCEDURE — 63600175 PHARM REV CODE 636 W HCPCS: Performed by: NURSE PRACTITIONER

## 2023-05-06 PROCEDURE — 96372 THER/PROPH/DIAG INJ SC/IM: CPT | Performed by: NURSE PRACTITIONER

## 2023-05-06 PROCEDURE — 99284 EMERGENCY DEPT VISIT MOD MDM: CPT | Mod: ,,, | Performed by: NURSE PRACTITIONER

## 2023-05-06 RX ORDER — PROMETHAZINE HYDROCHLORIDE 25 MG/1
TABLET ORAL
Qty: 10 TABLET | Refills: 0 | Status: SHIPPED | OUTPATIENT
Start: 2023-05-06 | End: 2023-06-13 | Stop reason: SDUPTHER

## 2023-05-06 RX ORDER — KETOROLAC TROMETHAMINE 30 MG/ML
60 INJECTION, SOLUTION INTRAMUSCULAR; INTRAVENOUS
Status: COMPLETED | OUTPATIENT
Start: 2023-05-06 | End: 2023-05-06

## 2023-05-06 RX ORDER — PROMETHAZINE HYDROCHLORIDE 25 MG/ML
25 INJECTION, SOLUTION INTRAMUSCULAR; INTRAVENOUS
Status: COMPLETED | OUTPATIENT
Start: 2023-05-06 | End: 2023-05-06

## 2023-05-06 RX ADMIN — PROMETHAZINE HYDROCHLORIDE 25 MG: 25 INJECTION INTRAMUSCULAR; INTRAVENOUS at 09:05

## 2023-05-06 RX ADMIN — KETOROLAC TROMETHAMINE 60 MG: 30 INJECTION, SOLUTION INTRAMUSCULAR at 09:05

## 2023-05-06 NOTE — ED TRIAGE NOTES
Headache for 1 week, pt reports history of migraines. Pt reports doing botox for migraine prevention.

## 2023-05-06 NOTE — ED PROVIDER NOTES
Encounter Date: 5/6/2023       History     Chief Complaint   Patient presents with    Headache     26 y/o female presents to the ED for migraine headache.  Typical of her migraines, onset 5 days ago, bilateral frontal with n/v, photophobia and phonophobia.  Current preventive just started botox 2 months ago.  Uses motrin as needed for headaches, not relieved.  Denies other complaints.    Review of patient's allergies indicates:   Allergen Reactions    Adhesive      Adhesive on hormone patch    Allergen ext-venom-honey bee Other (See Comments)    Insect venom      Bee sting     Past Medical History:   Diagnosis Date    Cardiac murmur     Endometriosis     Migraine headache 11/13/2019    PCOS (polycystic ovarian syndrome)     Periumbilical hernia 08/20/2020    repaired by Dr. Dwyer on 10/07/2020    Retroflexion of uterus 07/31/2018    3rd degree     Past Surgical History:   Procedure Laterality Date    CHOLECYSTECTOMY  3-2022    Cyst removed from cervix      Cyst removed from ovary      DILATION AND CURETTAGE OF UTERUS      HERNIA REPAIR      HYSTERECTOMY      LAPAROSCOPIC CHOLECYSTECTOMY N/A 03/09/2022    Procedure: CHOLECYSTECTOMY, LAPAROSCOPIC;  Surgeon: Denny Dwyer MD;  Location: [facility];  Service: General;  Laterality: N/A;    LOOP ELECTROSURGICAL EXCISION PROCEDURE (LEEP)      Operative Laparoscopy with lysis of adhesions      RH/BSO with limited cystoscopy  09/24/2018    TONSILLECTOMY  2018     Family History   Problem Relation Age of Onset    Breast cancer Other     Diabetes Other      Social History     Tobacco Use    Smoking status: Never     Passive exposure: Current    Smokeless tobacco: Never   Substance Use Topics    Alcohol use: Yes     Alcohol/week: 4.0 standard drinks     Types: 2 Glasses of wine, 2 Shots of liquor per week    Drug use: Never     Review of Systems   Constitutional:  Negative for fever.   HENT:  Negative for sore throat.    Eyes:  Positive for photophobia.   Respiratory:   Negative for shortness of breath.    Cardiovascular:  Negative for chest pain.   Gastrointestinal:  Negative for nausea.   Genitourinary:  Negative for dysuria.   Musculoskeletal:  Negative for back pain.   Skin:  Negative for rash.   Neurological:  Positive for headaches. Negative for weakness.   Hematological:  Does not bruise/bleed easily.   All other systems reviewed and are negative.    Physical Exam     Initial Vitals [05/06/23 0847]   BP Pulse Resp Temp SpO2   122/77 66 19 98.4 °F (36.9 °C) 100 %      MAP       --         Physical Exam    Nursing note and vitals reviewed.  Constitutional: She appears well-developed and well-nourished.   HENT:   Head: Normocephalic.   Eyes: EOM are normal. Pupils are equal, round, and reactive to light.   Neck: Neck supple.   Cardiovascular:  Normal rate, regular rhythm, normal heart sounds and intact distal pulses.           Pulmonary/Chest: Breath sounds normal. She has no wheezes.   Abdominal: Abdomen is soft. Bowel sounds are normal. She exhibits no mass. There is no abdominal tenderness (generalized). There is no rebound and no guarding.   Musculoskeletal:         General: No tenderness. Normal range of motion.      Cervical back: Neck supple.     Neurological: She is alert and oriented to person, place, and time. She has normal strength and normal reflexes. GCS score is 15. GCS eye subscore is 4. GCS verbal subscore is 5. GCS motor subscore is 6.   Skin: Skin is warm and dry.   Psychiatric: She has a normal mood and affect.       Medical Screening Exam   See Full Note    ED Course   Procedures  Labs Reviewed - No data to display       Imaging Results    None          Medications   promethazine injection 25 mg (has no administration in time range)   ketorolac injection 60 mg (has no administration in time range)     Medical Decision Making:   Initial Assessment:   26 y/o female presents to the ED for migraine headache.  Typical of her migraines, onset 5 days ago,  bilateral frontal with n/v, photophobia and phonophobia.  Current preventive just started botox 2 months ago.  Uses motrin as needed for headaches, not relieved.  Denies other complaints.  Differential Diagnosis:   Migraine headache  Nausea / vomiting  photophobia  ED Management:  [facility]    Patient presents for emergent evaluation of acute migraine  In the ED patient found to have acute migraine.    No labs or imaging done, this is typical onset and presentation of chronic migraine    Discharge [facility]    Patient was managed in the ED with IM phenergan and toradol.    The response to treatment was good.    Patient was discharged in stable condition.  Detailed return precautions discussed.                        Clinical Impression:   Final diagnoses:  [G43.911] Intractable migraine with status migrainosus, unspecified migraine type (Primary)  [H53.143] Photophobia, bilateral  [R11.0] Nausea        ED Disposition Condition    Discharge Stable          ED Prescriptions       Medication Sig Dispense Start Date End Date Auth. Provider    promethazine (PHENERGAN) 25 MG tablet Take 1 tablet every 8 hours as needed for migraine.  Not more than 3 days of the week 10 tablet 5/6/2023 -- JOANA Zhang          Follow-up Information    None          JOANA Zhang  05/06/23 0932

## 2023-06-13 ENCOUNTER — PATIENT MESSAGE (OUTPATIENT)
Dept: RESEARCH | Facility: HOSPITAL | Age: 28
End: 2023-06-13

## 2023-06-13 ENCOUNTER — TELEPHONE (OUTPATIENT)
Dept: FAMILY MEDICINE | Facility: CLINIC | Age: 28
End: 2023-06-13
Payer: COMMERCIAL

## 2023-06-13 ENCOUNTER — OFFICE VISIT (OUTPATIENT)
Dept: FAMILY MEDICINE | Facility: CLINIC | Age: 28
End: 2023-06-13
Payer: COMMERCIAL

## 2023-06-13 VITALS
DIASTOLIC BLOOD PRESSURE: 68 MMHG | RESPIRATION RATE: 17 BRPM | TEMPERATURE: 99 F | SYSTOLIC BLOOD PRESSURE: 118 MMHG | HEIGHT: 69 IN | BODY MASS INDEX: 34.36 KG/M2 | HEART RATE: 71 BPM | OXYGEN SATURATION: 99 % | WEIGHT: 232 LBS

## 2023-06-13 DIAGNOSIS — E55.9 VITAMIN D DEFICIENCY: ICD-10-CM

## 2023-06-13 DIAGNOSIS — K21.9 GASTROESOPHAGEAL REFLUX DISEASE WITHOUT ESOPHAGITIS: ICD-10-CM

## 2023-06-13 DIAGNOSIS — R10.9 STOMACH CRAMPS: ICD-10-CM

## 2023-06-13 DIAGNOSIS — F32.0 CURRENT MILD EPISODE OF MAJOR DEPRESSIVE DISORDER, UNSPECIFIED WHETHER RECURRENT: ICD-10-CM

## 2023-06-13 DIAGNOSIS — J45.20 MILD INTERMITTENT ASTHMA, UNSPECIFIED WHETHER COMPLICATED: ICD-10-CM

## 2023-06-13 DIAGNOSIS — Z00.00 ROUTINE GENERAL MEDICAL EXAMINATION AT A HEALTH CARE FACILITY: Primary | ICD-10-CM

## 2023-06-13 DIAGNOSIS — Z13.1 SCREENING FOR DIABETES MELLITUS: ICD-10-CM

## 2023-06-13 DIAGNOSIS — F41.9 ANXIETY: ICD-10-CM

## 2023-06-13 DIAGNOSIS — Z13.220 SCREENING FOR LIPOID DISORDERS: ICD-10-CM

## 2023-06-13 PROCEDURE — 3008F PR BODY MASS INDEX (BMI) DOCUMENTED: ICD-10-PCS | Mod: ICN,,, | Performed by: NURSE PRACTITIONER

## 2023-06-13 PROCEDURE — 96372 THER/PROPH/DIAG INJ SC/IM: CPT | Mod: ICN,,, | Performed by: NURSE PRACTITIONER

## 2023-06-13 PROCEDURE — 3008F BODY MASS INDEX DOCD: CPT | Mod: ICN,,, | Performed by: NURSE PRACTITIONER

## 2023-06-13 PROCEDURE — 3078F DIAST BP <80 MM HG: CPT | Mod: ICN,,, | Performed by: NURSE PRACTITIONER

## 2023-06-13 PROCEDURE — 3074F PR MOST RECENT SYSTOLIC BLOOD PRESSURE < 130 MM HG: ICD-10-PCS | Mod: ICN,,, | Performed by: NURSE PRACTITIONER

## 2023-06-13 PROCEDURE — 99395 PR PREVENTIVE VISIT,EST,18-39: ICD-10-PCS | Mod: 25,ICN,, | Performed by: NURSE PRACTITIONER

## 2023-06-13 PROCEDURE — 96372 PR INJECTION,THERAP/PROPH/DIAG2ST, IM OR SUBCUT: ICD-10-PCS | Mod: ICN,,, | Performed by: NURSE PRACTITIONER

## 2023-06-13 PROCEDURE — 99395 PREV VISIT EST AGE 18-39: CPT | Mod: 25,ICN,, | Performed by: NURSE PRACTITIONER

## 2023-06-13 PROCEDURE — 3074F SYST BP LT 130 MM HG: CPT | Mod: ICN,,, | Performed by: NURSE PRACTITIONER

## 2023-06-13 PROCEDURE — 3078F PR MOST RECENT DIASTOLIC BLOOD PRESSURE < 80 MM HG: ICD-10-PCS | Mod: ICN,,, | Performed by: NURSE PRACTITIONER

## 2023-06-13 RX ORDER — DESVENLAFAXINE SUCCINATE 50 MG/1
50 TABLET, EXTENDED RELEASE ORAL DAILY
Qty: 30 TABLET | Refills: 11 | Status: SHIPPED | OUTPATIENT
Start: 2023-06-13 | End: 2023-06-29

## 2023-06-13 RX ORDER — VORTIOXETINE 10 MG/1
10 TABLET, FILM COATED ORAL DAILY
Qty: 30 TABLET | Refills: 3 | Status: SHIPPED | OUTPATIENT
Start: 2023-06-13 | End: 2023-06-13

## 2023-06-13 RX ORDER — DICYCLOMINE HYDROCHLORIDE 10 MG/1
CAPSULE ORAL
Qty: 120 CAPSULE | Refills: 2 | Status: SHIPPED | OUTPATIENT
Start: 2023-06-13

## 2023-06-13 RX ORDER — MONTELUKAST SODIUM 10 MG/1
TABLET ORAL
Qty: 30 TABLET | Refills: 11 | Status: SHIPPED | OUTPATIENT
Start: 2023-06-13 | End: 2023-06-19 | Stop reason: SDUPTHER

## 2023-06-13 RX ORDER — ERGOCALCIFEROL 1.25 MG/1
50000 CAPSULE ORAL
Qty: 4 CAPSULE | Refills: 2 | Status: SHIPPED | OUTPATIENT
Start: 2023-06-13 | End: 2023-08-28

## 2023-06-13 RX ORDER — KETOROLAC TROMETHAMINE 30 MG/ML
30 INJECTION, SOLUTION INTRAMUSCULAR; INTRAVENOUS
Status: COMPLETED | OUTPATIENT
Start: 2023-06-13 | End: 2023-06-13

## 2023-06-13 RX ORDER — EPINEPHRINE 0.3 MG/.3ML
INJECTION SUBCUTANEOUS
Qty: 1 EACH | Refills: 1 | Status: SHIPPED | OUTPATIENT
Start: 2023-06-13 | End: 2024-04-02 | Stop reason: CLARIF

## 2023-06-13 RX ORDER — OMEPRAZOLE 40 MG/1
40 CAPSULE, DELAYED RELEASE ORAL DAILY
Qty: 30 CAPSULE | Refills: 11 | Status: SHIPPED | OUTPATIENT
Start: 2023-06-13 | End: 2023-11-22

## 2023-06-13 RX ORDER — VORTIOXETINE 10 MG/1
10 TABLET, FILM COATED ORAL DAILY
Qty: 30 TABLET | Refills: 3 | Status: CANCELLED | OUTPATIENT
Start: 2023-06-13

## 2023-06-13 RX ORDER — PROMETHAZINE HYDROCHLORIDE 12.5 MG/1
12.5 TABLET ORAL 4 TIMES DAILY PRN
COMMUNITY
Start: 2023-06-02

## 2023-06-13 RX ORDER — AMITRIPTYLINE HYDROCHLORIDE 25 MG/1
50 TABLET, FILM COATED ORAL NIGHTLY
COMMUNITY
Start: 2023-06-02 | End: 2023-09-01

## 2023-06-13 RX ADMIN — KETOROLAC TROMETHAMINE 30 MG: 30 INJECTION, SOLUTION INTRAMUSCULAR; INTRAVENOUS at 02:06

## 2023-06-13 NOTE — PROGRESS NOTES
Pt called back to the clinic stating that her insurance would not cover the Trintellix Rx at [facility], but that it would be covered at [facility] in [place]. Requested that the Rx be transferred.

## 2023-06-13 NOTE — PROGRESS NOTES
Valeria Melendrez NP   [id number] [facility] [address]  [place]  [address]      PATIENT NAME: Yeny Beaver  : 1995  DATE: 23  MRN: [id number]      Billing Provider: Valeria Melendrez NP  Level of Service:   Patient PCP Information       Provider PCP Type    Guille Parisi MD General            Reason for Visit / Chief Complaint: Medication Refill         History of Present Illness / Problem Focused Workflow     Yeny Beaver presents to the clinic with Medication Refill     27 year old female presents to clinic for check up and medication refill. She states she has been doing well and denies problems.   CODE AS HEALTHY YOU        Review of Systems     @Review of Systems   Constitutional:  Negative for activity change, appetite change, fatigue and fever.   HENT:  Negative for nasal congestion, ear pain, hearing loss, rhinorrhea, sinus pressure/congestion, sore throat and trouble swallowing.    Eyes:  Negative for pain, discharge, redness, visual disturbance and eye dryness.   Respiratory:  Negative for cough, chest tightness, shortness of breath and wheezing.    Cardiovascular:  Negative for chest pain, palpitations and leg swelling.   Gastrointestinal:  Negative for abdominal distention, abdominal pain, constipation, diarrhea and vomiting.   Endocrine: Negative for cold intolerance, heat intolerance and polyuria.   Genitourinary:  Negative for bladder incontinence, difficulty urinating, dysuria, frequency, hematuria and urgency.   Musculoskeletal:  Negative for arthralgias, gait problem and myalgias.   Integumentary:  Negative for color change, rash and wound.   Allergic/Immunologic: Negative for environmental allergies and food allergies.   Neurological:  Positive for headaches. Negative for dizziness, weakness and light-headedness.   Psychiatric/Behavioral:  Positive for dysphoric mood. Negative for behavioral problems and sleep disturbance.      Medical / Social / Family  History     Past Medical History:   Diagnosis Date    Cardiac murmur     Endometriosis     Migraine headache 11/13/2019    PCOS (polycystic ovarian syndrome)     Periumbilical hernia 08/20/2020    repaired by Dr. Dwyer on 10/07/2020    Retroflexion of uterus 07/31/2018    3rd degree       Past Surgical History:   Procedure Laterality Date    CHOLECYSTECTOMY  3-2022    Cyst removed from cervix      Cyst removed from ovary      DILATION AND CURETTAGE OF UTERUS      HERNIA REPAIR      HYSTERECTOMY      LAPAROSCOPIC CHOLECYSTECTOMY N/A 03/09/2022    Procedure: CHOLECYSTECTOMY, LAPAROSCOPIC;  Surgeon: Denny Dwyer MD;  Location: [facility];  Service: General;  Laterality: N/A;    LOOP ELECTROSURGICAL EXCISION PROCEDURE (LEEP)      Operative Laparoscopy with lysis of adhesions      RH/BSO with limited cystoscopy  09/24/2018    TONSILLECTOMY  2018       Social History  Ms.  reports that she has never smoked. She has been exposed to tobacco smoke. She has never used smokeless tobacco. She reports current alcohol use of about 4.0 standard drinks per week. She reports that she does not use drugs.    Family History  Ms.'s family history includes Breast cancer in an other family member; Diabetes in an other family member.    Medications and Allergies     Medications  Outpatient Medications Marked as Taking for the 6/13/23 encounter (Office Visit) with Valeria Melendrez NP   Medication Sig Dispense Refill    albuterol (PROVENTIL) 2.5 mg /3 mL (0.083 %) nebulizer solution Take 3 mLs (2.5 mg total) by nebulization every 4 to 6 hours as needed for Wheezing. 1 each 1    albuterol (PROVENTIL/VENTOLIN HFA) 90 mcg/actuation inhaler Inhale 1-2 puffs into the lungs every 6 (six) hours as needed for Wheezing. Rescue 18 g 1    amitriptyline (ELAVIL) 25 MG tablet Take 50 mg by mouth every evening.      cyanocobalamin 1,000 mcg/mL injection cyanocobalamin (vit B-12) 1,000 mcg/mL injection solution  INJECT 1 ML INTRAMUSCULARLY MONTHLY 1 mL  5    diclofenac potassium (ZIPSOR) 25 mg Cap Take 1 capsule by mouth 4 (four) times daily.      dicyclomine (BENTYL) 10 MG capsule TAKE ONE CAPSULE BY MOUTH FOUR TIMES DAILY BEFORE MEALS & NIGHTLY 120 capsule 2    EPINEPHrine (EPIPEN) 0.3 mg/0.3 mL AtIn epinephrine 0.3 mg/0.3 mL injection, auto-injector  USE AS DIRECTED FOR ACUTE ALLERGIC REACTION Strength: 0.3 mg/0.3 mL 1 each 1    ergocalciferol (ERGOCALCIFEROL) 50,000 unit Cap Take 1 capsule (50,000 Units total) by mouth every 7 days. 4 capsule 2    estradioL (ESTRACE) 2 MG tablet Take 1 tablet (2 mg total) by mouth 2 (two) times a day. 60 tablet 2    fluticasone-salmeterol diskus inhaler 250-50 mcg Inhale 1 puff into the lungs daily as needed (wheezing). Controller 1 each 3    montelukast (SINGULAIR) 10 mg tablet montelukast 10 mg tablet   TAKE 1 TABLET BY MOUTH DAILY AT BEDTIME 30 tablet 3    omeprazole (PRILOSEC) 40 MG capsule Take 1 capsule (40 mg total) by mouth once daily. 30 capsule 11    ondansetron (ZOFRAN-ODT) 8 MG TbDL Take by mouth.      promethazine (PHENERGAN) 12.5 MG Tab Take 12.5 mg by mouth 4 (four) times daily as needed.      tiZANidine (ZANAFLEX) 4 MG tablet TAKE 1 OR 2 TABLETS BY MOUTH AT BEDTIME AS NEEDED FOR NECK PAIN / SPASM      triamcinolone acetonide 0.1% (KENALOG) 0.1 % ointment Apply to affected area 2 TIMES A DAY, NO LONGER THEN 1 WEEK 454 g 0       Allergies  Review of patient's allergies indicates:   Allergen Reactions    Adhesive      Adhesive on hormone patch    Allergen ext-venom-honey bee Other (See Comments)    Insect venom      Bee sting       Physical Examination     Vitals:    06/13/23 1347   BP: 118/68   Pulse: 71   Resp: 17   Temp: 98.6 °F (37 °C)     Physical Exam  Vitals and nursing note reviewed.   Constitutional:       Appearance: She is obese.   HENT:      Head: Normocephalic.      Right Ear: Tympanic membrane normal.      Left Ear: Tympanic membrane normal.      Nose: Nose normal.      Mouth/Throat:      Mouth:  Mucous membranes are moist.      Pharynx: Oropharynx is clear. No posterior oropharyngeal erythema.   Eyes:      Conjunctiva/sclera: Conjunctivae normal.   Cardiovascular:      Rate and Rhythm: Normal rate and regular rhythm.      Pulses: Normal pulses.      Heart sounds: Normal heart sounds.   Pulmonary:      Effort: Pulmonary effort is normal.      Breath sounds: Normal breath sounds.   Abdominal:      General: Abdomen is flat. Bowel sounds are normal. There is no distension.      Palpations: Abdomen is soft.   Musculoskeletal:         General: No swelling or tenderness. Normal range of motion.      Cervical back: Normal range of motion.      Right lower leg: No edema.      Left lower leg: No edema.   Skin:     General: Skin is warm and dry.      Capillary Refill: Capillary refill takes less than 2 seconds.   Neurological:      Mental Status: She is alert. Mental status is at baseline.   Psychiatric:         Mood and Affect: Mood normal.         Behavior: Behavior normal.             Lab Results   Component Value Date    WBC 9.86 11/04/2022    HGB 12.3 11/04/2022    HCT 36.5 (L) 11/04/2022    MCV 83.5 11/04/2022     11/04/2022        CMP  Sodium   Date Value Ref Range Status   11/04/2022 135 (L) 136 - 145 mmol/L Final     Potassium   Date Value Ref Range Status   11/04/2022 4.0 3.5 - 5.1 mmol/L Final     Chloride   Date Value Ref Range Status   11/04/2022 103 98 - 107 mmol/L Final     CO2   Date Value Ref Range Status   11/04/2022 22 21 - 32 mmol/L Final     Glucose   Date Value Ref Range Status   11/04/2022 103 74 - 106 mg/dL Final     BUN   Date Value Ref Range Status   11/04/2022 8 7 - 18 mg/dL Final     Creatinine   Date Value Ref Range Status   11/04/2022 0.59 0.55 - 1.02 mg/dL Final     Calcium   Date Value Ref Range Status   11/04/2022 8.5 8.5 - 10.1 mg/dL Final     Total Protein   Date Value Ref Range Status   03/07/2022 7.6 6.4 - 8.2 g/dL Final     Albumin   Date Value Ref Range Status   03/07/2022  3.2 (L) 3.5 - 5.0 g/dL Final     Bilirubin, Total   Date Value Ref Range Status   03/07/2022 0.3 0.0 - 1.2 mg/dL Final     Alk Phos   Date Value Ref Range Status   03/07/2022 65 37 - 98 U/L Final     AST   Date Value Ref Range Status   03/07/2022 15 15 - 37 U/L Final     ALT   Date Value Ref Range Status   03/07/2022 19 13 - 56 U/L Final     Anion Gap   Date Value Ref Range Status   11/04/2022 14 7 - 16 mmol/L Final     eGFR   Date Value Ref Range Status   11/04/2022 129 >=60 mL/min/1.73m² Final     Procedures   Assessment and Plan (including Health Maintenance)   :    Plan:           Problem List Items Addressed This Visit          Pulmonary    Mild intermittent asthma       Endocrine    Vitamin D deficiency       GI    Gastroesophageal reflux disease without esophagitis     Other Visit Diagnoses       Stomach cramps                The patient has no Health Maintenance topics of status Not Due    Future Appointments   Date Time Provider Department Center   6/16/2023  7:45 AM Valeria Melendrez NP [facility]        Health Maintenance Due   Topic Date Due    COVID-19 Vaccine (1) Never done    HPV Vaccines (1 - 2-dose series) Never done    TETANUS VACCINE  Never done        No follow-ups on file.     Signature:  Valeria Melendrez NP  [address] [place]  [address]    Date of encounter: 6/13/23

## 2023-06-13 NOTE — TELEPHONE ENCOUNTER
Pt called back to the clinic stating that her insurance would not cover the Trintellix Rx. She stated that some alternative medications were discussed during her office visit, she would like an alternative Rx sent to [facility] in [place].

## 2023-06-13 NOTE — TELEPHONE ENCOUNTER
Rx for Pristiq was sent in via office visit encounter today as an alternative to Trintellix. Notified Pt, she voiced understanding.

## 2023-06-19 ENCOUNTER — OFFICE VISIT (OUTPATIENT)
Dept: FAMILY MEDICINE | Facility: CLINIC | Age: 28
End: 2023-06-19
Payer: COMMERCIAL

## 2023-06-19 VITALS
HEIGHT: 69 IN | SYSTOLIC BLOOD PRESSURE: 112 MMHG | RESPIRATION RATE: 17 BRPM | BODY MASS INDEX: 33.92 KG/M2 | OXYGEN SATURATION: 98 % | WEIGHT: 229 LBS | HEART RATE: 86 BPM | TEMPERATURE: 98 F | DIASTOLIC BLOOD PRESSURE: 78 MMHG

## 2023-06-19 DIAGNOSIS — Z13.220 SCREENING FOR LIPOID DISORDERS: ICD-10-CM

## 2023-06-19 DIAGNOSIS — J01.40 ACUTE NON-RECURRENT PANSINUSITIS: Primary | ICD-10-CM

## 2023-06-19 DIAGNOSIS — E53.8 VITAMIN B12 DEFICIENCY: ICD-10-CM

## 2023-06-19 DIAGNOSIS — Z13.1 SCREENING FOR DIABETES MELLITUS: ICD-10-CM

## 2023-06-19 DIAGNOSIS — J45.20 MILD INTERMITTENT ASTHMA, UNSPECIFIED WHETHER COMPLICATED: ICD-10-CM

## 2023-06-19 LAB
CHOLEST SERPL-MCNC: 176 MG/DL (ref 0–200)
CHOLEST/HDLC SERPL: 3.3 {RATIO}
FOLATE SERPL-MCNC: 14.1 NG/ML (ref 3.1–17.5)
GLUCOSE SERPL-MCNC: 63 MG/DL (ref 74–106)
HDLC SERPL-MCNC: 53 MG/DL (ref 40–60)
LDLC SERPL CALC-MCNC: 109 MG/DL
LDLC/HDLC SERPL: 2.1 {RATIO}
NONHDLC SERPL-MCNC: 123 MG/DL
TRIGL SERPL-MCNC: 69 MG/DL (ref 35–150)
VIT B12 SERPL-MCNC: 1676 PG/ML (ref 193–986)
VLDLC SERPL-MCNC: 14 MG/DL

## 2023-06-19 PROCEDURE — 3078F PR MOST RECENT DIASTOLIC BLOOD PRESSURE < 80 MM HG: ICD-10-PCS | Mod: ICN,,, | Performed by: NURSE PRACTITIONER

## 2023-06-19 PROCEDURE — 82947 ASSAY GLUCOSE BLOOD QUANT: CPT | Mod: ,,, | Performed by: CLINICAL MEDICAL LABORATORY

## 2023-06-19 PROCEDURE — 82607 VITAMIN B-12: CPT | Mod: ,,, | Performed by: CLINICAL MEDICAL LABORATORY

## 2023-06-19 PROCEDURE — 1160F PR REVIEW ALL MEDS BY PRESCRIBER/CLIN PHARMACIST DOCUMENTED: ICD-10-PCS | Mod: ICN,,, | Performed by: NURSE PRACTITIONER

## 2023-06-19 PROCEDURE — 1160F RVW MEDS BY RX/DR IN RCRD: CPT | Mod: ICN,,, | Performed by: NURSE PRACTITIONER

## 2023-06-19 PROCEDURE — 1159F MED LIST DOCD IN RCRD: CPT | Mod: ICN,,, | Performed by: NURSE PRACTITIONER

## 2023-06-19 PROCEDURE — 82947 GLUCOSE, FASTING: ICD-10-PCS | Mod: ,,, | Performed by: CLINICAL MEDICAL LABORATORY

## 2023-06-19 PROCEDURE — 3008F BODY MASS INDEX DOCD: CPT | Mod: ICN,,, | Performed by: NURSE PRACTITIONER

## 2023-06-19 PROCEDURE — 3074F PR MOST RECENT SYSTOLIC BLOOD PRESSURE < 130 MM HG: ICD-10-PCS | Mod: ICN,,, | Performed by: NURSE PRACTITIONER

## 2023-06-19 PROCEDURE — 82607 VITAMIN B12/FOLATE, SERUM PANEL: ICD-10-PCS | Mod: ,,, | Performed by: CLINICAL MEDICAL LABORATORY

## 2023-06-19 PROCEDURE — 80061 LIPID PANEL: ICD-10-PCS | Mod: ,,, | Performed by: CLINICAL MEDICAL LABORATORY

## 2023-06-19 PROCEDURE — 82746 ASSAY OF FOLIC ACID SERUM: CPT | Mod: ,,, | Performed by: CLINICAL MEDICAL LABORATORY

## 2023-06-19 PROCEDURE — 99214 OFFICE O/P EST MOD 30 MIN: CPT | Mod: 25,ICN,, | Performed by: NURSE PRACTITIONER

## 2023-06-19 PROCEDURE — 1159F PR MEDICATION LIST DOCUMENTED IN MEDICAL RECORD: ICD-10-PCS | Mod: ICN,,, | Performed by: NURSE PRACTITIONER

## 2023-06-19 PROCEDURE — 3074F SYST BP LT 130 MM HG: CPT | Mod: ICN,,, | Performed by: NURSE PRACTITIONER

## 2023-06-19 PROCEDURE — 96372 PR INJECTION,THERAP/PROPH/DIAG2ST, IM OR SUBCUT: ICD-10-PCS | Mod: ICN,,, | Performed by: NURSE PRACTITIONER

## 2023-06-19 PROCEDURE — 80061 LIPID PANEL: CPT | Mod: ,,, | Performed by: CLINICAL MEDICAL LABORATORY

## 2023-06-19 PROCEDURE — 99214 PR OFFICE/OUTPT VISIT, EST, LEVL IV, 30-39 MIN: ICD-10-PCS | Mod: 25,ICN,, | Performed by: NURSE PRACTITIONER

## 2023-06-19 PROCEDURE — 3008F PR BODY MASS INDEX (BMI) DOCUMENTED: ICD-10-PCS | Mod: ICN,,, | Performed by: NURSE PRACTITIONER

## 2023-06-19 PROCEDURE — 3078F DIAST BP <80 MM HG: CPT | Mod: ICN,,, | Performed by: NURSE PRACTITIONER

## 2023-06-19 PROCEDURE — 96372 THER/PROPH/DIAG INJ SC/IM: CPT | Mod: ICN,,, | Performed by: NURSE PRACTITIONER

## 2023-06-19 PROCEDURE — 82746 VITAMIN B12/FOLATE, SERUM PANEL: ICD-10-PCS | Mod: ,,, | Performed by: CLINICAL MEDICAL LABORATORY

## 2023-06-19 RX ORDER — CEFTRIAXONE 1 G/1
1 INJECTION, POWDER, FOR SOLUTION INTRAMUSCULAR; INTRAVENOUS
Status: COMPLETED | OUTPATIENT
Start: 2023-06-19 | End: 2023-06-19

## 2023-06-19 RX ORDER — DEXAMETHASONE SODIUM PHOSPHATE 4 MG/ML
4 INJECTION, SOLUTION INTRA-ARTICULAR; INTRALESIONAL; INTRAMUSCULAR; INTRAVENOUS; SOFT TISSUE
Status: COMPLETED | OUTPATIENT
Start: 2023-06-19 | End: 2023-06-19

## 2023-06-19 RX ORDER — MONTELUKAST SODIUM 10 MG/1
TABLET ORAL
Qty: 30 TABLET | Refills: 11 | Status: SHIPPED | OUTPATIENT
Start: 2023-06-19 | End: 2023-09-01 | Stop reason: SDUPTHER

## 2023-06-19 RX ORDER — AZITHROMYCIN 250 MG/1
TABLET, FILM COATED ORAL
Qty: 6 TABLET | Refills: 0 | Status: SHIPPED | OUTPATIENT
Start: 2023-06-19 | End: 2023-07-18 | Stop reason: ALTCHOICE

## 2023-06-19 RX ADMIN — DEXAMETHASONE SODIUM PHOSPHATE 4 MG: 4 INJECTION, SOLUTION INTRA-ARTICULAR; INTRALESIONAL; INTRAMUSCULAR; INTRAVENOUS; SOFT TISSUE at 09:06

## 2023-06-19 RX ADMIN — CEFTRIAXONE 1 G: 1 INJECTION, POWDER, FOR SOLUTION INTRAMUSCULAR; INTRAVENOUS at 09:06

## 2023-06-19 NOTE — PROGRESS NOTES
Valeria Melendrez NP   [id number]  [place]  [address]      PATIENT NAME: Yeny Beaver  : 1995  DATE: 23  MRN: [id number]      Billing Provider: Valeria Melendrez NP  Level of Service: NM OFFICE/OUTPT VISIT, EST, LEVL IV, 30-39 MIN  Patient PCP Information       Provider PCP Type    Guille Parisi MD General            Reason for Visit / Chief Complaint: Nasal Congestion (Started Saturday ), Otalgia (Bilateral. Started yesterday. Has been using ear drops. ), and Headache (Started yesterday. )         History of Present Illness / Problem Focused Workflow     Yeny Beaver presents to the clinic with Nasal Congestion (Started Saturday ), Otalgia (Bilateral. Started yesterday. Has been using ear drops. ), and Headache (Started yesterday. )     27 year old female presents to clinic with complaints of nasal congestion, bilat ear pain, and headache sine Saturday. Denies fever. States her niece has been sick with similar symptoms.         Review of Systems     @Review of Systems   Constitutional:  Negative for activity change.   HENT:  Positive for nasal congestion, ear pain, rhinorrhea and sinus pressure/congestion. Negative for hearing loss and trouble swallowing.    Eyes:  Negative for discharge.   Respiratory:  Positive for cough. Negative for chest tightness and wheezing.    Cardiovascular:  Negative for chest pain and palpitations.   Gastrointestinal:  Negative for constipation, diarrhea and vomiting.   Genitourinary:  Negative for difficulty urinating and hematuria.   Neurological:  Positive for headaches.   Psychiatric/Behavioral:  Negative for dysphoric mood.      Medical / Social / Family History     Past Medical History:   Diagnosis Date    Cardiac murmur     Endometriosis     Migraine headache 2019    PCOS (polycystic ovarian syndrome)     Periumbilical hernia 2020    repaired by Dr. Dwyer on 10/07/2020    Retroflexion of uterus 2018     3rd degree       Past Surgical History:   Procedure Laterality Date    CHOLECYSTECTOMY  3-2022    Cyst removed from cervix      Cyst removed from ovary      DILATION AND CURETTAGE OF UTERUS      HERNIA REPAIR      HYSTERECTOMY      LAPAROSCOPIC CHOLECYSTECTOMY N/A 03/09/2022    Procedure: CHOLECYSTECTOMY, LAPAROSCOPIC;  Surgeon: Denny Dwyer MD;  Location: [facility];  Service: General;  Laterality: N/A;    LOOP ELECTROSURGICAL EXCISION PROCEDURE (LEEP)      Operative Laparoscopy with lysis of adhesions      RH/BSO with limited cystoscopy  09/24/2018    TONSILLECTOMY  2018       Social History  Ms.  reports that she has never smoked. She has been exposed to tobacco smoke. She has never used smokeless tobacco. She reports current alcohol use of about 4.0 standard drinks per week. She reports that she does not use drugs.    Family History  Ms.'s family history includes Breast cancer in an other family member; Diabetes in an other family member.    Medications and Allergies     Medications  Outpatient Medications Marked as Taking for the 6/19/23 encounter (Office Visit) with Valeria Melendrez NP   Medication Sig Dispense Refill    albuterol (PROVENTIL) 2.5 mg /3 mL (0.083 %) nebulizer solution Take 3 mLs (2.5 mg total) by nebulization every 4 to 6 hours as needed for Wheezing. 1 each 1    albuterol (PROVENTIL/VENTOLIN HFA) 90 mcg/actuation inhaler Inhale 1-2 puffs into the lungs every 6 (six) hours as needed for Wheezing. Rescue 18 g 1    amitriptyline (ELAVIL) 25 MG tablet Take 50 mg by mouth every evening.      cyanocobalamin 1,000 mcg/mL injection cyanocobalamin (vit B-12) 1,000 mcg/mL injection solution  INJECT 1 ML INTRAMUSCULARLY MONTHLY 1 mL 5    desvenlafaxine succinate (PRISTIQ) 50 MG Tb24 Take 1 tablet (50 mg total) by mouth once daily. 30 tablet 11    diclofenac potassium (ZIPSOR) 25 mg Cap Take 1 capsule by mouth 4 (four) times daily.      dicyclomine (BENTYL) 10 MG capsule TAKE ONE CAPSULE BY MOUTH FOUR  TIMES DAILY BEFORE MEALS & NIGHTLY 120 capsule 2    EPINEPHrine (EPIPEN) 0.3 mg/0.3 mL AtIn epinephrine 0.3 mg/0.3 mL injection, auto-injector  USE AS DIRECTED FOR ACUTE ALLERGIC REACTION Strength: 0.3 mg/0.3 mL 1 each 1    ergocalciferol (ERGOCALCIFEROL) 50,000 unit Cap Take 1 capsule (50,000 Units total) by mouth every 7 days. 4 capsule 2    fluticasone-salmeterol diskus inhaler 250-50 mcg Inhale 1 puff into the lungs daily as needed (wheezing). Controller 1 each 3    ibuprofen (ADVIL,MOTRIN) 800 MG tablet ibuprofen 800 mg tablet   TAKE 1 TABLET BY MOUTH EVERY 6 TO 8 HOURS AS NEEDED FOR PAIN      NURTEC 75 mg odt Take 75 mg by mouth once as needed for Migraine.      omeprazole (PRILOSEC) 40 MG capsule Take 1 capsule (40 mg total) by mouth once daily. 30 capsule 11    ondansetron (ZOFRAN-ODT) 8 MG TbDL Take by mouth.      prochlorperazine (COMPAZINE) 10 MG tablet Take 10 mg by mouth 2 (two) times daily as needed.      promethazine (PHENERGAN) 12.5 MG Tab Take 12.5 mg by mouth 4 (four) times daily as needed.      rizatriptan (MAXALT-MLT) 10 MG disintegrating tablet Take by mouth.      tiZANidine (ZANAFLEX) 4 MG tablet TAKE 1 OR 2 TABLETS BY MOUTH AT BEDTIME AS NEEDED FOR NECK PAIN / SPASM      triamcinolone acetonide 0.1% (KENALOG) 0.1 % ointment Apply to affected area 2 TIMES A DAY, NO LONGER THEN 1 WEEK 454 g 0    [DISCONTINUED] montelukast (SINGULAIR) 10 mg tablet montelukast 10 mg tablet   TAKE 1 TABLET BY MOUTH DAILY AT BEDTIME 30 tablet 11     Current Facility-Administered Medications for the 6/19/23 encounter (Office Visit) with Valeria Melendrez NP   Medication Dose Route Frequency Provider Last Rate Last Admin    [COMPLETED] cefTRIAXone injection 1 g  1 g Intramuscular 1 time in Clinic/HOD Valeria Melendrez NP   1 g at 06/19/23 0932    [COMPLETED] dexAMETHasone injection 4 mg  4 mg Intramuscular 1 time in Clinic/HOD Valeria Melendrez NP   4 mg at 06/19/23 0935       Allergies  Review of patient's allergies  indicates:   Allergen Reactions    Adhesive      Adhesive on hormone patch    Allergen ext-venom-honey bee Other (See Comments)    Insect venom      Bee sting       Physical Examination     Vitals:    06/19/23 0849   BP: 112/78   Pulse: 86   Resp: 17   Temp: 98 °F (36.7 °C)     Physical Exam  Vitals and nursing note reviewed.   Constitutional:       Appearance: Normal appearance.   HENT:      Head: Normocephalic.      Right Ear: A middle ear effusion is present.      Left Ear: A middle ear effusion is present.      Nose: Congestion and rhinorrhea present. Rhinorrhea is purulent.      Right Turbinates: Pale.      Left Turbinates: Pale.      Right Sinus: Maxillary sinus tenderness and frontal sinus tenderness present.      Left Sinus: Maxillary sinus tenderness and frontal sinus tenderness present.      Mouth/Throat:      Lips: Pink.      Mouth: Mucous membranes are moist.      Pharynx: Oropharyngeal exudate (clear post nasal drainage.) and posterior oropharyngeal erythema present.   Eyes:      Conjunctiva/sclera: Conjunctivae normal.   Cardiovascular:      Rate and Rhythm: Normal rate and regular rhythm.      Pulses: Normal pulses.      Heart sounds: Normal heart sounds.   Pulmonary:      Effort: Pulmonary effort is normal.      Breath sounds: Normal breath sounds. No wheezing or rhonchi.   Abdominal:      General: Abdomen is flat. Bowel sounds are normal. There is no distension.      Palpations: Abdomen is soft.      Tenderness: There is no abdominal tenderness.   Musculoskeletal:         General: Normal range of motion.      Cervical back: Normal range of motion.   Skin:     General: Skin is warm and dry.      Capillary Refill: Capillary refill takes less than 2 seconds.   Neurological:      General: No focal deficit present.      Mental Status: She is alert and oriented to person, place, and time. Mental status is at baseline.   Psychiatric:         Mood and Affect: Mood normal.         Behavior: Behavior normal.              Lab Results   Component Value Date    WBC 9.86 11/04/2022    HGB 12.3 11/04/2022    HCT 36.5 (L) 11/04/2022    MCV 83.5 11/04/2022     11/04/2022        CMP  Sodium   Date Value Ref Range Status   11/04/2022 135 (L) 136 - 145 mmol/L Final     Potassium   Date Value Ref Range Status   11/04/2022 4.0 3.5 - 5.1 mmol/L Final     Chloride   Date Value Ref Range Status   11/04/2022 103 98 - 107 mmol/L Final     CO2   Date Value Ref Range Status   11/04/2022 22 21 - 32 mmol/L Final     Glucose   Date Value Ref Range Status   11/04/2022 103 74 - 106 mg/dL Final     BUN   Date Value Ref Range Status   11/04/2022 8 7 - 18 mg/dL Final     Creatinine   Date Value Ref Range Status   11/04/2022 0.59 0.55 - 1.02 mg/dL Final     Calcium   Date Value Ref Range Status   11/04/2022 8.5 8.5 - 10.1 mg/dL Final     Total Protein   Date Value Ref Range Status   03/07/2022 7.6 6.4 - 8.2 g/dL Final     Albumin   Date Value Ref Range Status   03/07/2022 3.2 (L) 3.5 - 5.0 g/dL Final     Bilirubin, Total   Date Value Ref Range Status   03/07/2022 0.3 0.0 - 1.2 mg/dL Final     Alk Phos   Date Value Ref Range Status   03/07/2022 65 37 - 98 U/L Final     AST   Date Value Ref Range Status   03/07/2022 15 15 - 37 U/L Final     ALT   Date Value Ref Range Status   03/07/2022 19 13 - 56 U/L Final     Anion Gap   Date Value Ref Range Status   11/04/2022 14 7 - 16 mmol/L Final     eGFR   Date Value Ref Range Status   11/04/2022 129 >=60 mL/min/1.73m² Final     Procedures   Assessment and Plan (including Health Maintenance)   :    Plan:           Problem List Items Addressed This Visit          ENT    Acute non-recurrent pansinusitis - Primary    Current Assessment & Plan     Rocephin and Decadron given IM in clinic.   Zithromax as ordered and Aprodine (which patient has at home) as needed.    Reviewed pathology of current symptoms, medication side effects/risk/benefits/directions on taking medications, and worsening or persistent  symptoms that require follow up in next 2-3 days. Saline/steroid nasal sprays, antihistamine use, increase fluid intake, and multivitamin/elderberry/Zinc use were recommended. May take Tylenol or Motrin as needed for pain and/or fever. Patient was instructed to take antibiotic as directed, complete entire course to avoid antibiotic resistance, and take OTC probiotic with antibiotic to prevent GI upset. Patient verbalized understanding of treatment plan and denies any questions.         Relevant Medications    cefTRIAXone injection 1 g (Completed)    dexAMETHasone injection 4 mg (Completed)    azithromycin (ZITHROMAX Z-REBEKA) 250 MG tablet       Pulmonary    Mild intermittent asthma    Relevant Medications    montelukast (SINGULAIR) 10 mg tablet       Endocrine    Vitamin B12 deficiency     Other Visit Diagnoses       Screening for lipoid disorders        Screening for diabetes mellitus                The patient has no Health Maintenance topics of status Not Due    Future Appointments   Date Time Provider Department Center   7/5/2023  1[facility]30 [facility]   6/17/2024  1:30 PM [facility]        Health Maintenance Due   Topic Date Due    COVID-19 Vaccine (1) Never done    Pneumococcal Vaccines (Age 0-64) (1 - PCV) Never done    HPV Vaccines (1 - 2-dose series) Never done    TETANUS VACCINE  Never done        No follow-ups on file.     Signature:  Valeria Melendrez NP  [facility]  [address] [place] [address], [place]  [address]    Date of encounter: 6/19/23

## 2023-06-19 NOTE — ASSESSMENT & PLAN NOTE
Rocephin and Decadron given IM in clinic.   Zithromax as ordered and Aprodine (which patient has at home) as needed.    Reviewed pathology of current symptoms, medication side effects/risk/benefits/directions on taking medications, and worsening or persistent symptoms that require follow up in next 2-3 days. Saline/steroid nasal sprays, antihistamine use, increase fluid intake, and multivitamin/elderberry/Zinc use were recommended. May take Tylenol or Motrin as needed for pain and/or fever. Patient was instructed to take antibiotic as directed, complete entire course to avoid antibiotic resistance, and take OTC probiotic with antibiotic to prevent GI upset. Patient verbalized understanding of treatment plan and denies any questions.

## 2023-06-20 ENCOUNTER — PATIENT MESSAGE (OUTPATIENT)
Dept: FAMILY MEDICINE | Facility: CLINIC | Age: 28
End: 2023-06-20
Payer: COMMERCIAL

## 2023-06-20 ENCOUNTER — PATIENT MESSAGE (OUTPATIENT)
Dept: RESEARCH | Facility: HOSPITAL | Age: 28
End: 2023-06-20

## 2023-06-21 RX ORDER — FLUTICASONE PROPIONATE 50 MCG
1 SPRAY, SUSPENSION (ML) NASAL DAILY
Qty: 18.2 ML | Refills: 2 | Status: SHIPPED | OUTPATIENT
Start: 2023-06-21 | End: 2023-07-18 | Stop reason: ALTCHOICE

## 2023-06-27 ENCOUNTER — PATIENT MESSAGE (OUTPATIENT)
Dept: RESEARCH | Facility: HOSPITAL | Age: 28
End: 2023-06-27

## 2023-06-29 ENCOUNTER — OFFICE VISIT (OUTPATIENT)
Dept: FAMILY MEDICINE | Facility: CLINIC | Age: 28
End: 2023-06-29
Payer: COMMERCIAL

## 2023-06-29 VITALS
OXYGEN SATURATION: 99 % | HEIGHT: 69 IN | WEIGHT: 230.38 LBS | SYSTOLIC BLOOD PRESSURE: 106 MMHG | HEART RATE: 80 BPM | DIASTOLIC BLOOD PRESSURE: 70 MMHG | BODY MASS INDEX: 34.12 KG/M2 | TEMPERATURE: 98 F | RESPIRATION RATE: 18 BRPM

## 2023-06-29 DIAGNOSIS — R10.9 FLANK PAIN: Primary | ICD-10-CM

## 2023-06-29 DIAGNOSIS — F41.9 ANXIETY: ICD-10-CM

## 2023-06-29 PROBLEM — F32.0 CURRENT MILD EPISODE OF MAJOR DEPRESSIVE DISORDER: Status: ACTIVE | Noted: 2023-06-29

## 2023-06-29 LAB
BILIRUB SERPL-MCNC: NEGATIVE MG/DL
BLOOD URINE, POC: NEGATIVE
COLOR, POC UA: YELLOW
GLUCOSE UR QL STRIP: NEGATIVE
KETONES UR QL STRIP: NEGATIVE
LEUKOCYTE ESTERASE URINE, POC: NEGATIVE
NITRITE, POC UA: NEGATIVE
PH, POC UA: 7
PROTEIN, POC: NEGATIVE
SPECIFIC GRAVITY, POC UA: 1.01
UROBILINOGEN, POC UA: 0.2

## 2023-06-29 PROCEDURE — 81003 POCT URINALYSIS W/O SCOPE: ICD-10-PCS | Mod: QW,,, | Performed by: NURSE PRACTITIONER

## 2023-06-29 PROCEDURE — 81003 URINALYSIS AUTO W/O SCOPE: CPT | Mod: QW,,, | Performed by: NURSE PRACTITIONER

## 2023-06-29 PROCEDURE — 3008F PR BODY MASS INDEX (BMI) DOCUMENTED: ICD-10-PCS | Mod: ,,, | Performed by: NURSE PRACTITIONER

## 2023-06-29 PROCEDURE — 3078F DIAST BP <80 MM HG: CPT | Mod: ,,, | Performed by: NURSE PRACTITIONER

## 2023-06-29 PROCEDURE — 3008F BODY MASS INDEX DOCD: CPT | Mod: ,,, | Performed by: NURSE PRACTITIONER

## 2023-06-29 PROCEDURE — 3078F PR MOST RECENT DIASTOLIC BLOOD PRESSURE < 80 MM HG: ICD-10-PCS | Mod: ,,, | Performed by: NURSE PRACTITIONER

## 2023-06-29 PROCEDURE — 3074F PR MOST RECENT SYSTOLIC BLOOD PRESSURE < 130 MM HG: ICD-10-PCS | Mod: ,,, | Performed by: NURSE PRACTITIONER

## 2023-06-29 PROCEDURE — 1160F PR REVIEW ALL MEDS BY PRESCRIBER/CLIN PHARMACIST DOCUMENTED: ICD-10-PCS | Mod: ,,, | Performed by: NURSE PRACTITIONER

## 2023-06-29 PROCEDURE — 3074F SYST BP LT 130 MM HG: CPT | Mod: ,,, | Performed by: NURSE PRACTITIONER

## 2023-06-29 PROCEDURE — 87086 URINE CULTURE/COLONY COUNT: CPT | Mod: ,,, | Performed by: CLINICAL MEDICAL LABORATORY

## 2023-06-29 PROCEDURE — 99213 PR OFFICE/OUTPT VISIT, EST, LEVL III, 20-29 MIN: ICD-10-PCS | Mod: ,,, | Performed by: NURSE PRACTITIONER

## 2023-06-29 PROCEDURE — 87086 CULTURE, URINE: ICD-10-PCS | Mod: ,,, | Performed by: CLINICAL MEDICAL LABORATORY

## 2023-06-29 PROCEDURE — 99213 OFFICE O/P EST LOW 20 MIN: CPT | Mod: ,,, | Performed by: NURSE PRACTITIONER

## 2023-06-29 PROCEDURE — 1160F RVW MEDS BY RX/DR IN RCRD: CPT | Mod: ,,, | Performed by: NURSE PRACTITIONER

## 2023-06-29 PROCEDURE — 1159F MED LIST DOCD IN RCRD: CPT | Mod: ,,, | Performed by: NURSE PRACTITIONER

## 2023-06-29 PROCEDURE — 1159F PR MEDICATION LIST DOCUMENTED IN MEDICAL RECORD: ICD-10-PCS | Mod: ,,, | Performed by: NURSE PRACTITIONER

## 2023-06-29 RX ORDER — BUSPIRONE HYDROCHLORIDE 15 MG/1
15 TABLET ORAL 3 TIMES DAILY
Qty: 90 TABLET | Refills: 11 | Status: SHIPPED | OUTPATIENT
Start: 2023-06-29 | End: 2024-06-28

## 2023-06-29 RX ORDER — DESVENLAFAXINE 100 MG/1
100 TABLET, EXTENDED RELEASE ORAL DAILY
Qty: 30 TABLET | Refills: 11 | Status: SHIPPED | OUTPATIENT
Start: 2023-06-29 | End: 2024-06-28

## 2023-06-29 NOTE — ASSESSMENT & PLAN NOTE
She reports increased anxiety and is requesting to increase Pristiq and Buspar. Talked with patient about healthy ways to manage stress such as meditation, exercise, listening to music, talking with a friend. Follow up in 1 month to discuss effectiveness of increased dosage of meds.

## 2023-06-29 NOTE — ASSESSMENT & PLAN NOTE
UA normal. Patient request Urine Culture. Instructed to increase fluids, avoid holding urine, take regular bathroom breaks. Follow up if symptoms persist or worsen. Pain may be musculoskeletal. May try NSAIDs, heating pad, muscle relaxant.

## 2023-06-29 NOTE — PROGRESS NOTES
Valeria Melendrez NP   [facility]  [address]  [place]  [address]      PATIENT NAME: Yeny Beaver  : 1995  DATE: 23  MRN: [id number]      Billing Provider: Valeria Melendrez NP  Level of Service:   Patient PCP Information       Provider PCP Type    Guille Parisi MD General            Reason for Visit / Chief Complaint: Low-back Pain (States that when she has the urge to go to bathroom her lower back hurts. Started sat)         History of Present Illness / Problem Focused Workflow     Yeny Beaver presents to the clinic with Low-back Pain (States that when she has the urge to go to bathroom her lower back hurts. Started sat)     HPI    Review of Systems     @Review of Systems    Medical / Social / Family History     Past Medical History:   Diagnosis Date    Cardiac murmur     Endometriosis     Migraine headache 2019    PCOS (polycystic ovarian syndrome)     Periumbilical hernia 2020    repaired by Dr. Dwyer on 10/07/2020    Retroflexion of uterus 2018    3rd degree       Past Surgical History:   Procedure Laterality Date    CHOLECYSTECTOMY  3-2022    Cyst removed from cervix      Cyst removed from ovary      DILATION AND CURETTAGE OF UTERUS      HERNIA REPAIR      HYSTERECTOMY      LAPAROSCOPIC CHOLECYSTECTOMY N/A 2022    Procedure: CHOLECYSTECTOMY, LAPAROSCOPIC;  Surgeon: Denny Dwyer MD;  Location: [facility];  Service: General;  Laterality: N/A;    LOOP ELECTROSURGICAL EXCISION PROCEDURE (LEEP)      Operative Laparoscopy with lysis of adhesions      RH/BSO with limited cystoscopy  2018    TONSILLECTOMY  2018       Social History  Ms.  reports that she has never smoked. She has been exposed to tobacco smoke. She has never used smokeless tobacco. She reports current alcohol use of about 4.0 standard drinks per week. She reports that she does not use drugs.    Family History  Ms.'s family history includes Breast cancer in an other family  member; Diabetes in an other family member.    Medications and Allergies     Medications  Outpatient Medications Marked as Taking for the 6/29/23 encounter (Office Visit) with Valeria Melendrez NP   Medication Sig Dispense Refill    albuterol (PROVENTIL) 2.5 mg /3 mL (0.083 %) nebulizer solution Take 3 mLs (2.5 mg total) by nebulization every 4 to 6 hours as needed for Wheezing. 1 each 1    albuterol (PROVENTIL/VENTOLIN HFA) 90 mcg/actuation inhaler Inhale 1-2 puffs into the lungs every 6 (six) hours as needed for Wheezing. Rescue 18 g 1    amitriptyline (ELAVIL) 25 MG tablet Take 50 mg by mouth every evening.      azithromycin (ZITHROMAX Z-REBEKA) 250 MG tablet Take 2 tabs on day one followed by one tab daily on days 2-5. 6 tablet 0    cyanocobalamin 1,000 mcg/mL injection cyanocobalamin (vit B-12) 1,000 mcg/mL injection solution  INJECT 1 ML INTRAMUSCULARLY MONTHLY 1 mL 5    diclofenac potassium (ZIPSOR) 25 mg Cap Take 1 capsule by mouth 4 (four) times daily.      dicyclomine (BENTYL) 10 MG capsule TAKE ONE CAPSULE BY MOUTH FOUR TIMES DAILY BEFORE MEALS & NIGHTLY 120 capsule 2    EPINEPHrine (EPIPEN) 0.3 mg/0.3 mL AtIn epinephrine 0.3 mg/0.3 mL injection, auto-injector  USE AS DIRECTED FOR ACUTE ALLERGIC REACTION Strength: 0.3 mg/0.3 mL 1 each 1    ergocalciferol (ERGOCALCIFEROL) 50,000 unit Cap Take 1 capsule (50,000 Units total) by mouth every 7 days. 4 capsule 2    fluticasone propionate (FLONASE) 50 mcg/actuation nasal spray 1 spray (50 mcg total) by Each Nostril route once daily. 18.2 mL 2    fluticasone-salmeterol diskus inhaler 250-50 mcg Inhale 1 puff into the lungs daily as needed (wheezing). Controller 1 each 3    ibuprofen (ADVIL,MOTRIN) 800 MG tablet ibuprofen 800 mg tablet   TAKE 1 TABLET BY MOUTH EVERY 6 TO 8 HOURS AS NEEDED FOR PAIN      montelukast (SINGULAIR) 10 mg tablet montelukast 10 mg tablet  TAKE 1 TABLET BY MOUTH DAILY AT BEDTIME 30 tablet 11    NURTEC 75 mg odt Take 75 mg by mouth once as  needed for Migraine.      omeprazole (PRILOSEC) 40 MG capsule Take 1 capsule (40 mg total) by mouth once daily. 30 capsule 11    ondansetron (ZOFRAN-ODT) 8 MG TbDL Take by mouth.      prochlorperazine (COMPAZINE) 10 MG tablet Take 10 mg by mouth 2 (two) times daily as needed.      promethazine (PHENERGAN) 12.5 MG Tab Take 12.5 mg by mouth 4 (four) times daily as needed.      rizatriptan (MAXALT-MLT) 10 MG disintegrating tablet Take by mouth.      tiZANidine (ZANAFLEX) 4 MG tablet TAKE 1 OR 2 TABLETS BY MOUTH AT BEDTIME AS NEEDED FOR NECK PAIN / SPASM      triamcinolone acetonide 0.1% (KENALOG) 0.1 % ointment Apply to affected area 2 TIMES A DAY, NO LONGER THEN 1 WEEK 454 g 0    [DISCONTINUED] desvenlafaxine succinate (PRISTIQ) 50 MG Tb24 Take 1 tablet (50 mg total) by mouth once daily. 30 tablet 11       Allergies  Review of patient's allergies indicates:   Allergen Reactions    Adhesive      Adhesive on hormone patch    Allergen ext-venom-honey bee Other (See Comments)    Insect venom      Bee sting       Physical Examination     Vitals:    06/29/23 0854   BP: 106/70   Pulse: 80   Resp: 18   Temp: 97.9 °F (36.6 °C)     Physical Exam          Lab Results   Component Value Date    WBC 9.86 11/04/2022    HGB 12.3 11/04/2022    HCT 36.5 (L) 11/04/2022    MCV 83.5 11/04/2022     11/04/2022        CMP  Sodium   Date Value Ref Range Status   11/04/2022 135 (L) 136 - 145 mmol/L Final     Potassium   Date Value Ref Range Status   11/04/2022 4.0 3.5 - 5.1 mmol/L Final     Chloride   Date Value Ref Range Status   11/04/2022 103 98 - 107 mmol/L Final     CO2   Date Value Ref Range Status   11/04/2022 22 21 - 32 mmol/L Final     Glucose   Date Value Ref Range Status   11/04/2022 103 74 - 106 mg/dL Final     BUN   Date Value Ref Range Status   11/04/2022 8 7 - 18 mg/dL Final     Creatinine   Date Value Ref Range Status   11/04/2022 0.59 0.55 - 1.02 mg/dL Final     Calcium   Date Value Ref Range Status   11/04/2022 8.5 8.5  - 10.1 mg/dL Final     Total Protein   Date Value Ref Range Status   03/07/2022 7.6 6.4 - 8.2 g/dL Final     Albumin   Date Value Ref Range Status   03/07/2022 3.2 (L) 3.5 - 5.0 g/dL Final     Bilirubin, Total   Date Value Ref Range Status   03/07/2022 0.3 0.0 - 1.2 mg/dL Final     Alk Phos   Date Value Ref Range Status   03/07/2022 65 37 - 98 U/L Final     AST   Date Value Ref Range Status   03/07/2022 15 15 - 37 U/L Final     ALT   Date Value Ref Range Status   03/07/2022 19 13 - 56 U/L Final     Anion Gap   Date Value Ref Range Status   11/04/2022 14 7 - 16 mmol/L Final     eGFR   Date Value Ref Range Status   11/04/2022 129 >=60 mL/min/1.73m² Final     Procedures   Assessment and Plan (including Health Maintenance)   :    Plan:           Problem List Items Addressed This Visit    None  Visit Diagnoses       Pain    -  Primary    Relevant Orders    POCT URINALYSIS W/O SCOPE (Completed)    Kidney pain        Relevant Orders    Urine culture            The patient has no Health Maintenance topics of status Not Due    Future Appointments   Date Time Provider Department Center   6/17/2024  1:30 PM Valeria Melendrez NP [facility]        Health Maintenance Due   Topic Date Due    COVID-19 Vaccine (1) Never done    Pneumococcal Vaccines (Age 0-64) (1 - PCV) Never done    HPV Vaccines (1 - 2-dose series) Never done    TETANUS VACCINE  Never done        No follow-ups on file.     Signature:  Valeria Melendrez NP  [address] [place]  [address]    Date of encounter: 6/29/23

## 2023-06-29 NOTE — PATIENT INSTRUCTIONS
"Patient Education       Diet and Health   The Basics   Written by the doctors and editors at [facility]   Why is it important to eat a healthy diet? -- It's important to eat a healthy diet because eating the right foods can keep you healthy now and later on in life.  Which foods are especially healthy? -- Foods that are especially healthy include:  Fruits and vegetables - Eating a diet with lots of fruits and vegetables can help prevent heart disease and strokes. It might also help prevent certain types of cancers. Try to eat fruits and vegetables at each meal and also for snacks. If you don't have fresh fruits and vegetables available, you can eat frozen or canned ones instead. Doctors recommend eating at least 2 1/2 servings of vegetables and 2 servings of fruits each day.  Foods with fiber - Eating foods with a lot of fiber can help prevent heart disease and strokes. If you have type 2 diabetes, it can also help control your blood sugar. Foods that have a lot of fiber include vegetables, fruits, beans, nuts, oatmeal, and whole grain breads and cereals. You can tell how much fiber is in a food by reading the nutrition label (figure 1). Doctors recommend eating 25 to 36 grams of fiber each day.  Foods with folate (also called folic acid) - Folate is a vitamin that is important for pregnant people, since it helps prevent certain birth defects. Anyone who could get pregnant should get at least 400 micrograms of folic acid daily, whether or not they are actively trying to get pregnant. Folate is found in many breakfast cereals, oranges, orange juice, and green leafy vegetables.  Foods with calcium and vitamin D - Babies, children, and adults need calcium and vitamin D to help keep their bones strong. Adults also need calcium and vitamin D to help prevent osteoporosis. Osteoporosis is a condition that causes bones to get "thin" and break more easily than usual. Different foods and drinks have calcium and vitamin D in " "them (figure 2). People who don't get enough calcium and vitamin D in their diet might need to take a supplement.  Foods with protein - Protein helps your muscles stay strong. Healthy foods with a lot of protein include chicken, fish, eggs, beans, nuts, and soy products. Red meat also has a lot of protein, but it also contains fats, which can be unhealthy.  Some experts recommend a "Mediterranean diet." This involves eating a lot of fruits, vegetables, nuts, whole grains, and olive oil. It also includes some fish, poultry, and dairy products, but not a lot of red meat. Eating this way can help your overall health, and might even lower your risk of having a stroke.  What foods should I avoid or limit? -- To eat a healthy diet, there are some things you should avoid or limit. They include:   Fats - There are different types of fats. Some types of fats are better for your body than others.  Trans fats are especially unhealthy. They are found in margarines, many fast foods, and some store-bought baked goods. Trans fats can raise your cholesterol level and your increase your chance of getting heart disease. You should avoid eating foods with these types of fats.  The type of "polyunsaturated" fats found in fish seems to be healthy and can reduce your chance of getting heart disease. Other polyunsaturated fats might also be good for your health. When you cook, it's best to use oils with healthier fats, such as olive oil and canola oil.  Sugar - To have a healthy diet, it's important to limit or avoid sugar, sweets, and refined grains. Refined grains are found in white bread, white rice, most forms of pasta, and most packaged "snack" foods. Whole grains, such as whole-wheat bread and brown rice, have more fiber and are better for your health.  Avoiding sugar-sweetened beverages, like soda and sports drinks, can also help improve your health.  Red meat - Studies have shown that eating a lot of red meat can increase your " "risk of certain health problems, including heart disease and cancer. You should limit the amount of red meat that you eat.  Can I drink alcohol as part of a healthy diet? -- People who drink a small amount of alcohol each day might have a lower chance of getting heart disease. But drinking alcohol can lead to problems. For example, it can raise a person's chances of getting liver disease and certain types of cancers. In women, even 1 drink a day can increase the risk of getting breast cancer.  Most doctors recommend that adult women not have more than 1 drink a day and that adult men not have more than 2 drinks a day. The limits are different because most women's bodies take longer to break down alcohol.  How many calories do I need each day? -- The number of calories you need each day depends on your weight, height, age, sex, and how active you are.  Your doctor or nurse can tell you how many calories you should eat each day. If you are trying to lose weight, you should eat fewer calories each day.  What if I have questions? -- If you have questions about which foods you should or should not eat, ask your doctor or nurse. The right diet for you will depend, in part, on your health and any medical conditions you have.  All topics are updated as new evidence becomes available and our peer review process is complete.  This topic retrieved from [employer] on: Sep 21, 2021.  Topic 30121 Version 20.0  Release: 29.4.2 - C29.263  © 2021 UpToDate, Inc. and/or its affiliates. All rights reserved.  figure 1: Nutrition label - fiber     This is an example of a nutrition label. To figure out how much fiber is in a food, look for the line that says "Dietary Fiber." It's also important to look at the serving size. This food has 7 grams of fiber in each serving, and each serving is 1 cup.  Graphic 21801 Version 7.0    figure 2: Foods and drinks with calcium and vitamin D     Foods rich in calcium include ice cream, soy milk, breads, " "kale, broccoli, milk, cheese, cottage cheese, almonds, yogurt, ready-to-eat cereals, beans, and tofu. Foods rich in vitamin D include milk, fortified plant-based "milks" (soy, almond), canned tuna fish, cod liver oil, yogurt, ready-to-eat-cereals, cooked salmon, canned sardines, mackerel, and eggs. Some of these foods are rich in both.  Graphic 90644 Version 4.0    Consumer Information Use and Disclaimer   This information is not specific medical advice and does not replace information you receive from your health care provider. This is only a brief summary of general information. It does NOT include all information about conditions, illnesses, injuries, tests, procedures, treatments, therapies, discharge instructions or life-style choices that may apply to you. You must talk with your health care provider for complete information about your health and treatment options. This information should not be used to decide whether or not to accept your health care provider's advice, instructions or recommendations. Only your health care provider has the knowledge and training to provide advice that is right for you. The use of this information is governed by the [employer] End User License Agreement, available at https://www.[employer].com/en/solutions/Shoulder Options/about/lexus.The use of [employer] content is governed by the [employer] Terms of Use. ©2021 UpToDate, Inc. All rights reserved.  Copyright   © 2021 UpToDate, Inc. and/or its affiliates. All rights reserved.    "

## 2023-06-29 NOTE — ASSESSMENT & PLAN NOTE
Will start her on Pristiq in addition to her Buspar due to report of increase anxiety and depression. Follow up in 1 month to discuss effectiveness. She denies homicidal or suicidal ideations.

## 2023-06-29 NOTE — ASSESSMENT & PLAN NOTE
Will start her on Pristiq in addition to her Buspar due to report of increase anxiety and depression. Follow up in 1 month to discuss effectiveness.

## 2023-06-29 NOTE — PROGRESS NOTES
Valeria Melendrez NP     [address]  [place]  [address]      PATIENT NAME: Yeny Beaver  : 1995  DATE: 23  MRN: [id number]      Billing Provider: Valeria Melendrez NP  Level of Service: [place] OFFICE/OUTPT VISIT, EST, LEVL III, 20-29 MIN  Patient PCP Information       Provider PCP Type    Guille Parisi MD General            Reason for Visit / Chief Complaint: Low-back Pain (States that when she has the urge to go to bathroom her lower back hurts. Started sat)         History of Present Illness / Problem Focused Workflow     Yeny Beaver presents to the clinic with Low-back Pain (States that when she has the urge to go to bathroom her lower back hurts. Started sat)     27 year old female presents to clinic with complaints of low back pain that started Saturday. She reports when she has the urge to urinate her lower back hurts. Denies burning with urination but does report some increased frequency and urgency.   She also reports increased anxiety. States Pristiq was helping but now seems to not be as effective. She is requesting to increase dosage.     Dysuria   This is a new problem. The current episode started in the past 7 days. The problem occurs every urination. The problem has been rapidly worsening. The quality of the pain is described as aching. The pain is at a severity of 8/10. The pain is moderate. There has been no fever. The fever has been present for 1 - 2 days. She is Sexually active. There is No history of pyelonephritis. Associated symptoms include flank pain, frequency, nausea and urgency. Pertinent negatives include no behavior changes, chills, discharge, hematuria, hesitancy, possible pregnancy, sweats, vomiting, weight loss, constipation, rash or withholding. She has tried NSAIDs for the symptoms. The treatment provided no relief. There is no history of catheterization, diabetes insipidus, diabetes mellitus, genitourinary reflux, hypertension,  kidney stones, recurrent UTIs, a single kidney, STD, urinary stasis or a urological procedure.   Low-back Pain  Associated symptoms include nausea. Pertinent negatives include no abdominal pain, arthralgias, chest pain, chills, congestion, coughing, fatigue, fever, headaches, myalgias, rash, sore throat, vomiting or weakness.     Review of Systems     @Review of Systems   Constitutional:  Negative for activity change, appetite change, chills, fatigue, fever and weight loss.   HENT:  Negative for nasal congestion, ear pain, rhinorrhea, sinus pressure/congestion and sore throat.    Eyes:  Negative for pain, redness, visual disturbance and eye dryness.   Respiratory:  Negative for cough and shortness of breath.    Cardiovascular:  Negative for chest pain and leg swelling.   Gastrointestinal:  Positive for nausea. Negative for abdominal distention, abdominal pain, constipation, diarrhea and vomiting.   Endocrine: Negative for cold intolerance, heat intolerance and polyuria.   Genitourinary:  Positive for dysuria, flank pain, frequency and urgency. Negative for bladder incontinence, hematuria and hesitancy.   Musculoskeletal:  Negative for arthralgias, gait problem and myalgias.   Integumentary:  Negative for color change, rash and wound.   Allergic/Immunologic: Negative for environmental allergies and food allergies.   Neurological:  Negative for dizziness, weakness, light-headedness and headaches.   Psychiatric/Behavioral:  Negative for behavioral problems and sleep disturbance. The patient is nervous/anxious.      Medical / Social / Family History     Past Medical History:   Diagnosis Date    Cardiac murmur     Endometriosis     Migraine headache 11/13/2019    PCOS (polycystic ovarian syndrome)     Periumbilical hernia 08/20/2020    repaired by Dr. Dwyer on 10/07/2020    Retroflexion of uterus 07/31/2018    3rd degree       Past Surgical History:   Procedure Laterality Date    CHOLECYSTECTOMY  3-2022    Cyst removed  from cervix      Cyst removed from ovary      DILATION AND CURETTAGE OF UTERUS      HERNIA REPAIR      HYSTERECTOMY      LAPAROSCOPIC CHOLECYSTECTOMY N/A 03/09/2022    Procedure: CHOLECYSTECTOMY, LAPAROSCOPIC;  Surgeon: Denny Dwyer MD;  Location: [facility], [place];  Service: General;  Laterality: N/A;    LOOP ELECTROSURGICAL EXCISION PROCEDURE (LEEP)      Operative Laparoscopy with lysis of adhesions      RH/BSO with limited cystoscopy  09/24/2018    TONSILLECTOMY  2018       Social History  Ms.  reports that she has never smoked. She has been exposed to tobacco smoke. She has never used smokeless tobacco. She reports current alcohol use of about 4.0 standard drinks per week. She reports that she does not use drugs.    Family History  Ms.'s family history includes Breast cancer in an other family member; Diabetes in an other family member.    Medications and Allergies     Medications  Outpatient Medications Marked as Taking for the 6/29/23 encounter (Office Visit) with Valeria Melendrez NP   Medication Sig Dispense Refill    albuterol (PROVENTIL) 2.5 mg /3 mL (0.083 %) nebulizer solution Take 3 mLs (2.5 mg total) by nebulization every 4 to 6 hours as needed for Wheezing. 1 each 1    albuterol (PROVENTIL/VENTOLIN HFA) 90 mcg/actuation inhaler Inhale 1-2 puffs into the lungs every 6 (six) hours as needed for Wheezing. Rescue 18 g 1    amitriptyline (ELAVIL) 25 MG tablet Take 50 mg by mouth every evening.      azithromycin (ZITHROMAX Z-REBEKA) 250 MG tablet Take 2 tabs on day one followed by one tab daily on days 2-5. 6 tablet 0    cyanocobalamin 1,000 mcg/mL injection cyanocobalamin (vit B-12) 1,000 mcg/mL injection solution  INJECT 1 ML INTRAMUSCULARLY MONTHLY 1 mL 5    diclofenac potassium (ZIPSOR) 25 mg Cap Take 1 capsule by mouth 4 (four) times daily.      dicyclomine (BENTYL) 10 MG capsule TAKE ONE CAPSULE BY MOUTH FOUR TIMES DAILY BEFORE MEALS & NIGHTLY 120 capsule 2    EPINEPHrine (EPIPEN) 0.3 mg/0.3 mL AtIn  epinephrine 0.3 mg/0.3 mL injection, auto-injector  USE AS DIRECTED FOR ACUTE ALLERGIC REACTION Strength: 0.3 mg/0.3 mL 1 each 1    ergocalciferol (ERGOCALCIFEROL) 50,000 unit Cap Take 1 capsule (50,000 Units total) by mouth every 7 days. 4 capsule 2    fluticasone propionate (FLONASE) 50 mcg/actuation nasal spray 1 spray (50 mcg total) by Each Nostril route once daily. 18.2 mL 2    fluticasone-salmeterol diskus inhaler 250-50 mcg Inhale 1 puff into the lungs daily as needed (wheezing). Controller 1 each 3    ibuprofen (ADVIL,MOTRIN) 800 MG tablet ibuprofen 800 mg tablet   TAKE 1 TABLET BY MOUTH EVERY 6 TO 8 HOURS AS NEEDED FOR PAIN      montelukast (SINGULAIR) 10 mg tablet montelukast 10 mg tablet  TAKE 1 TABLET BY MOUTH DAILY AT BEDTIME 30 tablet 11    NURTEC 75 mg odt Take 75 mg by mouth once as needed for Migraine.      omeprazole (PRILOSEC) 40 MG capsule Take 1 capsule (40 mg total) by mouth once daily. 30 capsule 11    ondansetron (ZOFRAN-ODT) 8 MG TbDL Take by mouth.      prochlorperazine (COMPAZINE) 10 MG tablet Take 10 mg by mouth 2 (two) times daily as needed.      promethazine (PHENERGAN) 12.5 MG Tab Take 12.5 mg by mouth 4 (four) times daily as needed.      rizatriptan (MAXALT-MLT) 10 MG disintegrating tablet Take by mouth.      tiZANidine (ZANAFLEX) 4 MG tablet TAKE 1 OR 2 TABLETS BY MOUTH AT BEDTIME AS NEEDED FOR NECK PAIN / SPASM      triamcinolone acetonide 0.1% (KENALOG) 0.1 % ointment Apply to affected area 2 TIMES A DAY, NO LONGER THEN 1 WEEK 454 g 0    [DISCONTINUED] desvenlafaxine succinate (PRISTIQ) 50 MG Tb24 Take 1 tablet (50 mg total) by mouth once daily. 30 tablet 11       Allergies  Review of patient's allergies indicates:   Allergen Reactions    Adhesive      Adhesive on hormone patch    Allergen ext-venom-honey bee Other (See Comments)    Insect venom      Bee sting       Physical Examination     Vitals:    06/29/23 0854   BP: 106/70   Pulse: 80   Resp: 18   Temp: 97.9 °F (36.6 °C)      Physical Exam  Vitals and nursing note reviewed.   HENT:      Head: Normocephalic.      Nose: Nose normal.      Mouth/Throat:      Mouth: Mucous membranes are moist.      Pharynx: Oropharynx is clear.   Eyes:      Conjunctiva/sclera: Conjunctivae normal.   Cardiovascular:      Rate and Rhythm: Normal rate and regular rhythm.      Pulses: Normal pulses.      Heart sounds: Normal heart sounds.   Pulmonary:      Effort: Pulmonary effort is normal.      Breath sounds: Normal breath sounds.   Abdominal:      General: Abdomen is flat. Bowel sounds are normal.      Palpations: Abdomen is soft.      Tenderness: There is abdominal tenderness in the suprapubic area. There is right CVA tenderness and left CVA tenderness.   Musculoskeletal:      Cervical back: Normal range of motion.      Lumbar back: Spasms and tenderness present.   Skin:     General: Skin is warm and dry.      Capillary Refill: Capillary refill takes less than 2 seconds.   Neurological:      Mental Status: She is alert. Mental status is at baseline.   Psychiatric:         Mood and Affect: Mood is anxious and depressed.         Behavior: Behavior normal.             Lab Results   Component Value Date    WBC 9.86 11/04/2022    HGB 12.3 11/04/2022    HCT 36.5 (L) 11/04/2022    MCV 83.5 11/04/2022     11/04/2022        CMP  Sodium   Date Value Ref Range Status   11/04/2022 135 (L) 136 - 145 mmol/L Final     Potassium   Date Value Ref Range Status   11/04/2022 4.0 3.5 - 5.1 mmol/L Final     Chloride   Date Value Ref Range Status   11/04/2022 103 98 - 107 mmol/L Final     CO2   Date Value Ref Range Status   11/04/2022 22 21 - 32 mmol/L Final     Glucose   Date Value Ref Range Status   11/04/2022 103 74 - 106 mg/dL Final     BUN   Date Value Ref Range Status   11/04/2022 8 7 - 18 mg/dL Final     Creatinine   Date Value Ref Range Status   11/04/2022 0.59 0.55 - 1.02 mg/dL Final     Calcium   Date Value Ref Range Status   11/04/2022 8.5 8.5 - 10.1 mg/dL  Final     Total Protein   Date Value Ref Range Status   03/07/2022 7.6 6.4 - 8.2 g/dL Final     Albumin   Date Value Ref Range Status   03/07/2022 3.2 (L) 3.5 - 5.0 g/dL Final     Bilirubin, Total   Date Value Ref Range Status   03/07/2022 0.3 0.0 - 1.2 mg/dL Final     Alk Phos   Date Value Ref Range Status   03/07/2022 65 37 - 98 U/L Final     AST   Date Value Ref Range Status   03/07/2022 15 15 - 37 U/L Final     ALT   Date Value Ref Range Status   03/07/2022 19 13 - 56 U/L Final     Anion Gap   Date Value Ref Range Status   11/04/2022 14 7 - 16 mmol/L Final     eGFR   Date Value Ref Range Status   11/04/2022 129 >=60 mL/min/1.73m² Final     Procedures   Assessment and Plan (including Health Maintenance)   :    Plan:           Problem List Items Addressed This Visit          Psychiatric    Anxiety    Current Assessment & Plan     She reports increased anxiety and is requesting to increase Pristiq and Buspar. Talked with patient about healthy ways to manage stress such as meditation, exercise, listening to music, talking with a friend. Follow up in 1 month to discuss effectiveness of increased dosage of meds.          Relevant Medications    busPIRone (BUSPAR) 15 MG tablet    desvenlafaxine succinate (PRISTIQ) 100 MG Tb24       GI    Flank pain - Primary    Current Assessment & Plan     UA normal. Patient request Urine Culture. Instructed to increase fluids, avoid holding urine, take regular bathroom breaks. Follow up if symptoms persist or worsen. Pain may be musculoskeletal. May try NSAIDs, heating pad, muscle relaxant.          Relevant Orders    POCT URINALYSIS W/O SCOPE (Completed)    Urine culture       The patient has no Health Maintenance topics of status Not Due    Future Appointments   Date Time Provider Department Center   6/17/2024  1:30 PM Valeria Melendrez NP [facility]        Health Maintenance Due   Topic Date Due    COVID-19 Vaccine (1) Never done    Pneumococcal Vaccines (Age 0-64)  (1 - PCV) Never done    HPV Vaccines (1 - 2-dose series) Never done    TETANUS VACCINE  Never done        No follow-ups on file.     Signature:  Valeria Melendrez NP  [address]  [address]    Date of encounter: 6/29/23

## 2023-06-29 NOTE — PROGRESS NOTES
Subjective     Patient ID: Yeny Beaver is a 27 y.o. female.    Chief Complaint: Medication Refill    27 year old female presents to clinic for Healthy You visit. She states she has been doing well. She does report some increased anxiety and depression and would like to discuss starting medication for this.     Review of Systems   Constitutional:  Negative for activity change, appetite change, fatigue and fever.   HENT:  Negative for nasal congestion, ear pain, hearing loss, rhinorrhea, sinus pressure/congestion, sore throat and trouble swallowing.    Eyes:  Negative for pain, discharge, redness, visual disturbance and eye dryness.   Respiratory:  Negative for cough, chest tightness, shortness of breath and wheezing.    Cardiovascular:  Negative for chest pain, palpitations and leg swelling.   Gastrointestinal:  Negative for abdominal distention, abdominal pain, constipation, diarrhea and vomiting.   Endocrine: Negative for cold intolerance, heat intolerance and polyuria.   Genitourinary:  Negative for bladder incontinence, difficulty urinating, dysuria, frequency, hematuria and urgency.   Musculoskeletal:  Negative for arthralgias, gait problem and myalgias.   Integumentary:  Negative for color change, rash and wound.   Allergic/Immunologic: Negative for environmental allergies and food allergies.   Neurological:  Positive for headaches. Negative for dizziness, weakness and light-headedness.   Psychiatric/Behavioral:  Positive for dysphoric mood. Negative for behavioral problems and sleep disturbance.      Tobacco Use: Medium Risk    Smoking Tobacco Use: Never    Smokeless Tobacco Use: Never    Passive Exposure: Current     Review of patient's allergies indicates:   Allergen Reactions    Adhesive      Adhesive on hormone patch    Allergen ext-venom-honey bee Other (See Comments)    Insect venom      Bee sting     Current Outpatient Medications   Medication Instructions    albuterol (PROVENTIL) 2.5 mg,  Nebulization, Every 4-6 hours PRN    albuterol (PROVENTIL/VENTOLIN HFA) 90 mcg/actuation inhaler 1-2 puffs, Inhalation, Every 6 hours PRN, Rescue    amitriptyline (ELAVIL) 50 mg, Oral, Nightly    azithromycin (ZITHROMAX Z-REBEKA) 250 MG tablet Take 2 tabs on day one followed by one tab daily on days 2-5.    busPIRone (BUSPAR) 15 mg, Oral, 3 times daily    cyanocobalamin 1,000 mcg/mL injection cyanocobalamin (vit B-12) 1,000 mcg/mL injection solution  INJECT 1 ML INTRAMUSCULARLY MONTHLY    desvenlafaxine succinate (PRISTIQ) 100 mg, Oral, Daily    diclofenac potassium (ZIPSOR) 25 mg Cap 1 capsule, Oral, 4 times daily    dicyclomine (BENTYL) 10 MG capsule TAKE ONE CAPSULE BY MOUTH FOUR TIMES DAILY BEFORE MEALS & NIGHTLY    EPINEPHrine (EPIPEN) 0.3 mg/0.3 mL AtIn epinephrine 0.3 mg/0.3 mL injection, auto-injector  USE AS DIRECTED FOR ACUTE ALLERGIC REACTION Strength: 0.3 mg/0.3 mL    ergocalciferol (ERGOCALCIFEROL) 50,000 Units, Oral, Every 7 days    fluticasone propionate (FLONASE) 50 mcg, Each Nostril, Daily    fluticasone-salmeterol diskus inhaler 250-50 mcg 1 puff, Inhalation, Daily PRN, Controller    ibuprofen (ADVIL,MOTRIN) 800 MG tablet ibuprofen 800 mg tablet   TAKE 1 TABLET BY MOUTH EVERY 6 TO 8 HOURS AS NEEDED FOR PAIN    montelukast (SINGULAIR) 10 mg tablet montelukast 10 mg tablet  TAKE 1 TABLET BY MOUTH DAILY AT BEDTIME    NURTEC 75 mg, Oral, Once as needed    omeprazole (PRILOSEC) 40 mg, Oral, Daily    ondansetron (ZOFRAN-ODT) 8 MG TbDL Oral    prochlorperazine (COMPAZINE) 10 mg, Oral, 2 times daily PRN    promethazine (PHENERGAN) 12.5 mg, Oral, 4 times daily PRN    rizatriptan (MAXALT-MLT) 10 MG disintegrating tablet Oral    tiZANidine (ZANAFLEX) 4 MG tablet TAKE 1 OR 2 TABLETS BY MOUTH AT BEDTIME AS NEEDED FOR NECK PAIN / SPASM    triamcinolone acetonide 0.1% (KENALOG) 0.1 % ointment Apply to affected area 2 TIMES A DAY, NO LONGER THEN 1 WEEK     Medications Discontinued During This Encounter   Medication  "Reason    omeprazole (PRILOSEC) 40 MG capsule Reorder    EPINEPHrine (EPIPEN) 0.3 mg/0.3 mL AtIn Reorder    ergocalciferol (ERGOCALCIFEROL) 50,000 unit Cap Reorder    montelukast (SINGULAIR) 10 mg tablet Reorder    dicyclomine (BENTYL) 10 MG capsule Reorder    traZODone (DESYREL) 100 MG tablet Patient no longer taking    amitriptyline (ELAVIL) 10 MG tablet Patient no longer taking    promethazine (PHENERGAN) 25 MG tablet Duplicate Order    ciprofloxacin-dexAMETHasone 0.3-0.1% (CIPRODEX) 0.3-0.1 % DrpS Patient no longer taking    TOSYMRA 10 mg/actuation Spry Patient no longer taking    galcanezumab-gnlm (EMGALITY PEN) 120 mg/mL PnIj Patient no longer taking    vortioxetine (TRINTELLIX) 10 mg Tab Cost of medication       Past Medical History:   Diagnosis Date    Cardiac murmur     Endometriosis     Migraine headache 11/13/2019    PCOS (polycystic ovarian syndrome)     Periumbilical hernia 08/20/2020    repaired by Dr. Dwyer on 10/07/2020    Retroflexion of uterus 07/31/2018    3rd degree     The patient has no Health Maintenance topics of status Not Due  Immunization History   Administered Date(s) Administered    Influenza 10/19/2021, 10/08/2022       Objective     Body mass index is 34.26 kg/m².  Wt Readings from Last 3 Encounters:   06/29/23 104.5 kg (230 lb 6.4 oz)   06/19/23 103.9 kg (229 lb)   06/13/23 105.2 kg (232 lb)     Ht Readings from Last 3 Encounters:   06/29/23 5' 9" (1.753 m)   06/19/23 5' 9" (1.753 m)   06/13/23 5' 9" (1.753 m)     BP Readings from Last 3 Encounters:   06/29/23 106/70   06/19/23 112/78   06/13/23 118/68     Temp Readings from Last 3 Encounters:   06/29/23 97.9 °F (36.6 °C) (Oral)   06/19/23 98 °F (36.7 °C) (Oral)   06/13/23 98.6 °F (37 °C) (Oral)     Pulse Readings from Last 3 Encounters:   06/29/23 80   06/19/23 86   06/13/23 71     Resp Readings from Last 3 Encounters:   06/29/23 18   06/19/23 17   06/13/23 17     PF Readings from Last 3 Encounters:   No data found for PF "       Physical Exam  Vitals and nursing note reviewed.   HENT:      Head: Normocephalic.      Right Ear: Tympanic membrane normal.      Left Ear: Tympanic membrane normal.      Nose: Nose normal.      Mouth/Throat:      Mouth: Mucous membranes are moist.      Pharynx: Oropharynx is clear. No posterior oropharyngeal erythema.   Eyes:      Conjunctiva/sclera: Conjunctivae normal.   Cardiovascular:      Rate and Rhythm: Normal rate and regular rhythm.      Pulses: Normal pulses.      Heart sounds: Normal heart sounds.   Pulmonary:      Effort: Pulmonary effort is normal.      Breath sounds: Normal breath sounds.   Abdominal:      General: Abdomen is flat. Bowel sounds are normal. There is no distension.      Palpations: Abdomen is soft.   Musculoskeletal:         General: No swelling or tenderness. Normal range of motion.      Cervical back: Normal range of motion.      Right lower leg: No edema.      Left lower leg: No edema.   Skin:     General: Skin is warm and dry.      Capillary Refill: Capillary refill takes less than 2 seconds.   Neurological:      Mental Status: She is alert. Mental status is at baseline.   Psychiatric:         Mood and Affect: Mood is anxious and depressed.         Behavior: Behavior normal.       Assessment and Plan     Problem List Items Addressed This Visit          Psychiatric    Anxiety     Will start her on Pristiq in addition to her Buspar due to report of increase anxiety and depression. Follow up in 1 month to discuss effectiveness.          Current mild episode of major depressive disorder     Will start her on Pristiq in addition to her Buspar due to report of increase anxiety and depression. Follow up in 1 month to discuss effectiveness. She denies homicidal or suicidal ideations.             Pulmonary    Mild intermittent asthma       Endocrine    Vitamin D deficiency     Continue ergocalciferol as ordered. Follow up in 3 months or as needed.          Relevant Medications     ergocalciferol (ERGOCALCIFEROL) 50,000 unit Cap       GI    Gastroesophageal reflux disease without esophagitis    Relevant Medications    omeprazole (PRILOSEC) 40 MG capsule     Other Visit Diagnoses       Routine general medical examination at a health care facility    -  Primary    Screening for lipoid disorders        Relevant Orders    Lipid Panel (Completed)    Screening for diabetes mellitus        Relevant Orders    Glucose, Fasting (Completed)    Stomach cramps        Relevant Medications    dicyclomine (BENTYL) 10 MG capsule            Plan:   Instructed patient to keep appts as scheduled.   Instructed on DASH diet and increased exercise. Recommended at least 150 minutes a week with resistance training as tolerated. Monitor weight and try to lose some.   Instructed on importance of taking all medications as prescribed.   Discussed yearly dental and eye exams.    Discussed use of sun screen, helmets and seat belts.  Gun safety discussed  Sleep discussed  Stay away from tobacco products    Discussed and provided with a screening schedule and personal prevention plan in accordance with USPSTF age appropriate recommendations and screening guidelines.   Education given and reviewed with patient. Counseling and referrals were provided as needed.  After Visit Summary printed and given to patient which includes written education and a list of any referrals if indicated.      F/u plan for yearly AWV.        I have reviewed the medications, allergies, and problem list.     Goal Actions:    What type of visit is the patient here for today?: Healthy You  Does the patient consent to enroll in Color Me Healthy?: Yes  Choose 3: Biometric, Nutrition, Lifestyle  Biometric Actions: Attend regularly scheduled office visits  Lifestyle Actions : Take medications as prescribed, Develop good support system  Nurtrition Actions: Avoid adding table salt, Read nutrition labels, Recommend weight loss, Eat a well-balanced diet

## 2023-07-01 LAB — UA COMPLETE W REFLEX CULTURE PNL UR: NORMAL

## 2023-07-05 ENCOUNTER — PATIENT MESSAGE (OUTPATIENT)
Dept: RESEARCH | Facility: HOSPITAL | Age: 28
End: 2023-07-05

## 2023-07-11 ENCOUNTER — PATIENT MESSAGE (OUTPATIENT)
Dept: RESEARCH | Facility: HOSPITAL | Age: 28
End: 2023-07-11

## 2023-07-18 ENCOUNTER — OFFICE VISIT (OUTPATIENT)
Dept: FAMILY MEDICINE | Facility: CLINIC | Age: 28
End: 2023-07-18
Payer: COMMERCIAL

## 2023-07-18 VITALS
BODY MASS INDEX: 34.24 KG/M2 | RESPIRATION RATE: 18 BRPM | SYSTOLIC BLOOD PRESSURE: 120 MMHG | WEIGHT: 231.19 LBS | HEART RATE: 88 BPM | OXYGEN SATURATION: 98 % | TEMPERATURE: 99 F | DIASTOLIC BLOOD PRESSURE: 80 MMHG | HEIGHT: 69 IN

## 2023-07-18 DIAGNOSIS — M54.41 ACUTE MIDLINE LOW BACK PAIN WITH BILATERAL SCIATICA: Primary | ICD-10-CM

## 2023-07-18 DIAGNOSIS — F32.0 CURRENT MILD EPISODE OF MAJOR DEPRESSIVE DISORDER, UNSPECIFIED WHETHER RECURRENT: ICD-10-CM

## 2023-07-18 DIAGNOSIS — Z13.31 POSITIVE DEPRESSION SCREENING: ICD-10-CM

## 2023-07-18 DIAGNOSIS — M54.42 ACUTE MIDLINE LOW BACK PAIN WITH BILATERAL SCIATICA: Primary | ICD-10-CM

## 2023-07-18 LAB
ALBUMIN SERPL BCP-MCNC: 3.2 G/DL (ref 3.5–5)
ALBUMIN/GLOB SERPL: 0.8 {RATIO}
ALP SERPL-CCNC: 65 U/L (ref 37–98)
ALT SERPL W P-5'-P-CCNC: 24 U/L (ref 13–56)
ANION GAP SERPL CALCULATED.3IONS-SCNC: 8 MMOL/L (ref 7–16)
AST SERPL W P-5'-P-CCNC: 20 U/L (ref 15–37)
BASOPHILS # BLD AUTO: 0.05 K/UL (ref 0–0.2)
BASOPHILS NFR BLD AUTO: 0.6 % (ref 0–1)
BILIRUB SERPL-MCNC: 0.2 MG/DL (ref ?–1.2)
BUN SERPL-MCNC: 6 MG/DL (ref 7–18)
BUN/CREAT SERPL: 11 (ref 6–20)
CALCIUM SERPL-MCNC: 9 MG/DL (ref 8.5–10.1)
CHLORIDE SERPL-SCNC: 106 MMOL/L (ref 98–107)
CO2 SERPL-SCNC: 28 MMOL/L (ref 21–32)
CREAT SERPL-MCNC: 0.57 MG/DL (ref 0.55–1.02)
CRP SERPL-MCNC: 1.44 MG/DL (ref 0–0.8)
DIFFERENTIAL METHOD BLD: ABNORMAL
EGFR (NO RACE VARIABLE) (RUSH/TITUS): 130 ML/MIN/1.73M2
EOSINOPHIL # BLD AUTO: 0.07 K/UL (ref 0–0.5)
EOSINOPHIL NFR BLD AUTO: 0.8 % (ref 1–4)
ERYTHROCYTE [DISTWIDTH] IN BLOOD BY AUTOMATED COUNT: 11.9 % (ref 11.5–14.5)
ERYTHROCYTE [SEDIMENTATION RATE] IN BLOOD BY WESTERGREN METHOD: 26 MM/HR (ref 0–20)
GLOBULIN SER-MCNC: 4.2 G/DL (ref 2–4)
GLUCOSE SERPL-MCNC: 70 MG/DL (ref 74–106)
HCT VFR BLD AUTO: 37.3 % (ref 38–47)
HGB BLD-MCNC: 11.9 G/DL (ref 12–16)
IMM GRANULOCYTES # BLD AUTO: 0.02 K/UL (ref 0–0.04)
IMM GRANULOCYTES NFR BLD: 0.2 % (ref 0–0.4)
LYMPHOCYTES # BLD AUTO: 2.05 K/UL (ref 1–4.8)
LYMPHOCYTES NFR BLD AUTO: 23.6 % (ref 27–41)
MCH RBC QN AUTO: 27.5 PG (ref 27–31)
MCHC RBC AUTO-ENTMCNC: 31.9 G/DL (ref 32–36)
MCV RBC AUTO: 86.1 FL (ref 80–96)
MONOCYTES # BLD AUTO: 0.69 K/UL (ref 0–0.8)
MONOCYTES NFR BLD AUTO: 7.9 % (ref 2–6)
MPC BLD CALC-MCNC: 9.5 FL (ref 9.4–12.4)
NEUTROPHILS # BLD AUTO: 5.82 K/UL (ref 1.8–7.7)
NEUTROPHILS NFR BLD AUTO: 66.9 % (ref 53–65)
NRBC # BLD AUTO: 0 X10E3/UL
NRBC, AUTO (.00): 0 %
PLATELET # BLD AUTO: 436 K/UL (ref 150–400)
POTASSIUM SERPL-SCNC: 3.8 MMOL/L (ref 3.5–5.1)
PROT SERPL-MCNC: 7.4 G/DL (ref 6.4–8.2)
RBC # BLD AUTO: 4.33 M/UL (ref 4.2–5.4)
SODIUM SERPL-SCNC: 138 MMOL/L (ref 136–145)
WBC # BLD AUTO: 8.7 K/UL (ref 4.5–11)

## 2023-07-18 PROCEDURE — 80053 COMPREHENSIVE METABOLIC PANEL: ICD-10-PCS | Mod: ,,, | Performed by: CLINICAL MEDICAL LABORATORY

## 2023-07-18 PROCEDURE — 3074F SYST BP LT 130 MM HG: CPT | Mod: ICN,,, | Performed by: NURSE PRACTITIONER

## 2023-07-18 PROCEDURE — 1159F MED LIST DOCD IN RCRD: CPT | Mod: ICN,,, | Performed by: NURSE PRACTITIONER

## 2023-07-18 PROCEDURE — 85025 COMPLETE CBC W/AUTO DIFF WBC: CPT | Mod: ,,, | Performed by: CLINICAL MEDICAL LABORATORY

## 2023-07-18 PROCEDURE — 3074F PR MOST RECENT SYSTOLIC BLOOD PRESSURE < 130 MM HG: ICD-10-PCS | Mod: ICN,,, | Performed by: NURSE PRACTITIONER

## 2023-07-18 PROCEDURE — 86140 C-REACTIVE PROTEIN: ICD-10-PCS | Mod: ,,, | Performed by: CLINICAL MEDICAL LABORATORY

## 2023-07-18 PROCEDURE — 99214 OFFICE O/P EST MOD 30 MIN: CPT | Mod: 25,ICN,, | Performed by: NURSE PRACTITIONER

## 2023-07-18 PROCEDURE — 85651 RBC SED RATE NONAUTOMATED: CPT | Mod: ,,, | Performed by: CLINICAL MEDICAL LABORATORY

## 2023-07-18 PROCEDURE — 3008F BODY MASS INDEX DOCD: CPT | Mod: ICN,,, | Performed by: NURSE PRACTITIONER

## 2023-07-18 PROCEDURE — 86140 C-REACTIVE PROTEIN: CPT | Mod: ,,, | Performed by: CLINICAL MEDICAL LABORATORY

## 2023-07-18 PROCEDURE — 3008F PR BODY MASS INDEX (BMI) DOCUMENTED: ICD-10-PCS | Mod: ICN,,, | Performed by: NURSE PRACTITIONER

## 2023-07-18 PROCEDURE — 3079F DIAST BP 80-89 MM HG: CPT | Mod: ICN,,, | Performed by: NURSE PRACTITIONER

## 2023-07-18 PROCEDURE — 36415 COLL VENOUS BLD VENIPUNCTURE: CPT | Performed by: NURSE PRACTITIONER

## 2023-07-18 PROCEDURE — 3079F PR MOST RECENT DIASTOLIC BLOOD PRESSURE 80-89 MM HG: ICD-10-PCS | Mod: ICN,,, | Performed by: NURSE PRACTITIONER

## 2023-07-18 PROCEDURE — 85025 CBC WITH DIFFERENTIAL: ICD-10-PCS | Mod: ,,, | Performed by: CLINICAL MEDICAL LABORATORY

## 2023-07-18 PROCEDURE — 80053 COMPREHEN METABOLIC PANEL: CPT | Mod: ,,, | Performed by: CLINICAL MEDICAL LABORATORY

## 2023-07-18 PROCEDURE — 86038 ANA EIA W/REFLEX DSDNA/ENA: ICD-10-PCS | Mod: ,,, | Performed by: CLINICAL MEDICAL LABORATORY

## 2023-07-18 PROCEDURE — 99214 PR OFFICE/OUTPT VISIT, EST, LEVL IV, 30-39 MIN: ICD-10-PCS | Mod: 25,ICN,, | Performed by: NURSE PRACTITIONER

## 2023-07-18 PROCEDURE — 96372 PR INJECTION,THERAP/PROPH/DIAG2ST, IM OR SUBCUT: ICD-10-PCS | Mod: ICN,,, | Performed by: NURSE PRACTITIONER

## 2023-07-18 PROCEDURE — 85651 SEDIMENTATION RATE, AUTOMATED: ICD-10-PCS | Mod: ,,, | Performed by: CLINICAL MEDICAL LABORATORY

## 2023-07-18 PROCEDURE — 96372 THER/PROPH/DIAG INJ SC/IM: CPT | Mod: ICN,,, | Performed by: NURSE PRACTITIONER

## 2023-07-18 PROCEDURE — 86038 ANTINUCLEAR ANTIBODIES: CPT | Mod: ,,, | Performed by: CLINICAL MEDICAL LABORATORY

## 2023-07-18 PROCEDURE — 1159F PR MEDICATION LIST DOCUMENTED IN MEDICAL RECORD: ICD-10-PCS | Mod: ICN,,, | Performed by: NURSE PRACTITIONER

## 2023-07-18 RX ORDER — METHYLPREDNISOLONE ACETATE 40 MG/ML
40 INJECTION, SUSPENSION INTRA-ARTICULAR; INTRALESIONAL; INTRAMUSCULAR; SOFT TISSUE
Status: COMPLETED | OUTPATIENT
Start: 2023-07-18 | End: 2023-07-18

## 2023-07-18 RX ORDER — DEXAMETHASONE SODIUM PHOSPHATE 4 MG/ML
4 INJECTION, SOLUTION INTRA-ARTICULAR; INTRALESIONAL; INTRAMUSCULAR; INTRAVENOUS; SOFT TISSUE
Status: COMPLETED | OUTPATIENT
Start: 2023-07-18 | End: 2023-07-18

## 2023-07-18 RX ORDER — KETOROLAC TROMETHAMINE 10 MG/1
10 TABLET, FILM COATED ORAL EVERY 6 HOURS
Qty: 20 TABLET | Refills: 0 | Status: SHIPPED | OUTPATIENT
Start: 2023-07-18 | End: 2023-07-23

## 2023-07-18 RX ADMIN — METHYLPREDNISOLONE ACETATE 40 MG: 40 INJECTION, SUSPENSION INTRA-ARTICULAR; INTRALESIONAL; INTRAMUSCULAR; SOFT TISSUE at 10:07

## 2023-07-18 RX ADMIN — DEXAMETHASONE SODIUM PHOSPHATE 4 MG: 4 INJECTION, SOLUTION INTRA-ARTICULAR; INTRALESIONAL; INTRAMUSCULAR; INTRAVENOUS; SOFT TISSUE at 10:07

## 2023-07-18 NOTE — PROGRESS NOTES
Valeria Melendrez NP   [facility]  [address]  [place]  [address]      PATIENT NAME: Yeny Beaver  : 1995  DATE: 23  MRN: [id number]      Billing Provider: Valeria Melendrez NP  Level of Service: [place] OFFICE/OUTPT VISIT, EST, LEVL IV, 30-39 MIN  Patient PCP Information       Provider PCP Type    Guille Parisi MD General            Reason for Visit / Chief Complaint: Leg Pain (For the past 3 weeks or so she has been having pain starting at her right hip that radiates down her leg all the way to her foot.  Reports that she also has pain starting at left hip but it only radiates down to the left knee.)         History of Present Illness / Problem Focused Workflow     Yeny Beaver presents to the clinic with Leg Pain (For the past 3 weeks or so she has been having pain starting at her right hip that radiates down her leg all the way to her foot.  Reports that she also has pain starting at left hip but it only radiates down to the left knee.)     27 year old female presents to clinic with complaints of pain that starts at right hip and radiates down her leg all the way to her foot.  Reports that she also has pain starting at left hip but it only radiates down to the left knee. Does complain of some lower back pain as well. Denies any recent injury. Denies any loss of bladder and bowel control. She is requesting to be tested for autoimmune disorders as well. Mother has Lupus and Fibromyalgia.           Review of Systems     @Review of Systems   Constitutional:  Negative for activity change and unexpected weight change.   HENT:  Negative for hearing loss, rhinorrhea and trouble swallowing.    Eyes:  Negative for discharge and visual disturbance.   Respiratory:  Negative for chest tightness and wheezing.    Cardiovascular:  Negative for chest pain and palpitations.   Gastrointestinal:  Negative for blood in stool, constipation, diarrhea and vomiting.   Endocrine: Negative for  polydipsia and polyuria.   Genitourinary:  Negative for difficulty urinating, dysuria, hematuria and menstrual problem.   Musculoskeletal:  Positive for arthralgias, back pain, leg pain and myalgias. Negative for neck pain.   Neurological:  Positive for headaches. Negative for weakness.   Psychiatric/Behavioral:  Negative for confusion and dysphoric mood.      Medical / Social / Family History     Past Medical History:   Diagnosis Date    Cardiac murmur     Endometriosis     Migraine headache 11/13/2019    PCOS (polycystic ovarian syndrome)     Periumbilical hernia 08/20/2020    repaired by Dr. Dwyer on 10/07/2020    Retroflexion of uterus 07/31/2018    3rd degree       Past Surgical History:   Procedure Laterality Date    CHOLECYSTECTOMY  3-2022    Cyst removed from cervix      Cyst removed from ovary      DILATION AND CURETTAGE OF UTERUS      HERNIA REPAIR      HYSTERECTOMY      LAPAROSCOPIC CHOLECYSTECTOMY N/A 03/09/2022    Procedure: CHOLECYSTECTOMY, LAPAROSCOPIC;  Surgeon: Denny Dwyer MD;  Location: [facility];  Service: General;  Laterality: N/A;    LOOP ELECTROSURGICAL EXCISION PROCEDURE (LEEP)      Operative Laparoscopy with lysis of adhesions      RH/BSO with limited cystoscopy  09/24/2018    TONSILLECTOMY  2018       Social History  Ms.  reports that she has never smoked. She has been exposed to tobacco smoke. She has never used smokeless tobacco. She reports current alcohol use of about 4.0 standard drinks per week. She reports that she does not use drugs.    Family History  Ms.'s family history includes Breast cancer in an other family member; Diabetes in an other family member.    Medications and Allergies     Medications  Outpatient Medications Marked as Taking for the 7/18/23 encounter (Office Visit) with Valeria Melendrez NP   Medication Sig Dispense Refill    albuterol (PROVENTIL) 2.5 mg /3 mL (0.083 %) nebulizer solution Take 3 mLs (2.5 mg total) by nebulization every 4 to 6 hours as needed for  Wheezing. 1 each 1    albuterol (PROVENTIL/VENTOLIN HFA) 90 mcg/actuation inhaler Inhale 1-2 puffs into the lungs every 6 (six) hours as needed for Wheezing. Rescue 18 g 1    amitriptyline (ELAVIL) 25 MG tablet Take 50 mg by mouth every evening.      busPIRone (BUSPAR) 15 MG tablet Take 1 tablet (15 mg total) by mouth 3 (three) times daily. 90 tablet 11    cyanocobalamin 1,000 mcg/mL injection cyanocobalamin (vit B-12) 1,000 mcg/mL injection solution  INJECT 1 ML INTRAMUSCULARLY MONTHLY 1 mL 5    desvenlafaxine succinate (PRISTIQ) 100 MG Tb24 Take 1 tablet (100 mg total) by mouth once daily. 30 tablet 11    dicyclomine (BENTYL) 10 MG capsule TAKE ONE CAPSULE BY MOUTH FOUR TIMES DAILY BEFORE MEALS & NIGHTLY 120 capsule 2    EPINEPHrine (EPIPEN) 0.3 mg/0.3 mL AtIn epinephrine 0.3 mg/0.3 mL injection, auto-injector  USE AS DIRECTED FOR ACUTE ALLERGIC REACTION Strength: 0.3 mg/0.3 mL 1 each 1    ergocalciferol (ERGOCALCIFEROL) 50,000 unit Cap Take 1 capsule (50,000 Units total) by mouth every 7 days. 4 capsule 2    fluticasone-salmeterol diskus inhaler 250-50 mcg Inhale 1 puff into the lungs daily as needed (wheezing). Controller 1 each 3    montelukast (SINGULAIR) 10 mg tablet montelukast 10 mg tablet  TAKE 1 TABLET BY MOUTH DAILY AT BEDTIME 30 tablet 11    omeprazole (PRILOSEC) 40 MG capsule Take 1 capsule (40 mg total) by mouth once daily. 30 capsule 11    ondansetron (ZOFRAN-ODT) 8 MG TbDL Take by mouth.      promethazine (PHENERGAN) 12.5 MG Tab Take 12.5 mg by mouth 4 (four) times daily as needed.      tiZANidine (ZANAFLEX) 4 MG tablet TAKE 1 OR 2 TABLETS BY MOUTH AT BEDTIME AS NEEDED FOR NECK PAIN / SPASM      triamcinolone acetonide 0.1% (KENALOG) 0.1 % ointment Apply to affected area 2 TIMES A DAY, NO LONGER THEN 1 WEEK 454 g 0     Current Facility-Administered Medications for the 7/18/23 encounter (Office Visit) with Valeria Melendrez NP   Medication Dose Route Frequency Provider Last Rate Last Admin     [COMPLETED] dexAMETHasone injection 4 mg  4 mg Intramuscular 1 time in Clinic/HOD Valeria Melendrez, NP   4 mg at 07/18/23 1030    [COMPLETED] methylPREDNISolone acetate injection 40 mg  40 mg Intramuscular 1 time in Clinic/HOD Valeria Melendrez, NP   40 mg at 07/18/23 1030       Allergies  Review of patient's allergies indicates:   Allergen Reactions    Adhesive      Adhesive on hormone patch    Allergen ext-venom-honey bee Other (See Comments)    Insect venom      Bee sting       Physical Examination     Vitals:    07/18/23 0914   BP: 120/80   Pulse: 88   Resp: 18   Temp: 98.5 °F (36.9 °C)     Physical Exam  Vitals and nursing note reviewed.   HENT:      Head: Normocephalic.      Nose: Nose normal.      Mouth/Throat:      Mouth: Mucous membranes are moist.      Pharynx: Oropharynx is clear.   Eyes:      Conjunctiva/sclera: Conjunctivae normal.   Cardiovascular:      Rate and Rhythm: Normal rate and regular rhythm.      Pulses: Normal pulses.      Heart sounds: Normal heart sounds.   Pulmonary:      Effort: Pulmonary effort is normal.      Breath sounds: Normal breath sounds.   Abdominal:      General: Abdomen is flat. Bowel sounds are normal.      Palpations: Abdomen is soft.   Musculoskeletal:      Cervical back: Normal range of motion.      Lumbar back: Spasms and tenderness present. Positive right straight leg raise test and positive left straight leg raise test.   Skin:     General: Skin is warm and dry.      Capillary Refill: Capillary refill takes less than 2 seconds.   Neurological:      Mental Status: She is alert. Mental status is at baseline.   Psychiatric:         Mood and Affect: Mood normal.         Behavior: Behavior normal.             Lab Results   Component Value Date    WBC 9.86 11/04/2022    HGB 12.3 11/04/2022    HCT 36.5 (L) 11/04/2022    MCV 83.5 11/04/2022     11/04/2022        CMP  Sodium   Date Value Ref Range Status   11/04/2022 135 (L) 136 - 145 mmol/L Final     Potassium   Date  Value Ref Range Status   11/04/2022 4.0 3.5 - 5.1 mmol/L Final     Chloride   Date Value Ref Range Status   11/04/2022 103 98 - 107 mmol/L Final     CO2   Date Value Ref Range Status   11/04/2022 22 21 - 32 mmol/L Final     Glucose   Date Value Ref Range Status   11/04/2022 103 74 - 106 mg/dL Final     BUN   Date Value Ref Range Status   11/04/2022 8 7 - 18 mg/dL Final     Creatinine   Date Value Ref Range Status   11/04/2022 0.59 0.55 - 1.02 mg/dL Final     Calcium   Date Value Ref Range Status   11/04/2022 8.5 8.5 - 10.1 mg/dL Final     Total Protein   Date Value Ref Range Status   03/07/2022 7.6 6.4 - 8.2 g/dL Final     Albumin   Date Value Ref Range Status   03/07/2022 3.2 (L) 3.5 - 5.0 g/dL Final     Bilirubin, Total   Date Value Ref Range Status   03/07/2022 0.3 0.0 - 1.2 mg/dL Final     Alk Phos   Date Value Ref Range Status   03/07/2022 65 37 - 98 U/L Final     AST   Date Value Ref Range Status   03/07/2022 15 15 - 37 U/L Final     ALT   Date Value Ref Range Status   03/07/2022 19 13 - 56 U/L Final     Anion Gap   Date Value Ref Range Status   11/04/2022 14 7 - 16 mmol/L Final     eGFR   Date Value Ref Range Status   11/04/2022 129 >=60 mL/min/1.73m² Final     Procedures   Assessment and Plan (including Health Maintenance)   :    Plan:           Problem List Items Addressed This Visit          Psychiatric    Current mild episode of major depressive disorder    Current Assessment & Plan     She scored high on her PHQ today. States she thinks Pristiq may be helping some. Continue at current dosage. Follow up in 3 months to discuss. I did discuss counseling with patient and she declines this.             Orthopedic    Acute midline low back pain with bilateral sciatica - Primary    Current Assessment & Plan     Xray obtained and was normal.   Will treat with Depomedrol/Decadron injection in clinic and oral Toradol as ordered. May take Zanaflex as needed for spasms.   Discussed PT with patient and she wishes to  wait to see if symptoms improved with meds.   Patient was instructed to rest, take medications as directed, alternate ice/moist heat to area, and monitor for worsening symptoms that require immediate evaluation. Patient was instructed to follow up if symptoms persist past current treatment plan to discuss further options, such as physical therapy, referral to ortho or other diagnostic imaging. Medication side effects/risk/benefits/directions on taking medications were reviewed with patient. Patient verbalized understanding of treatment plan and denies any questions.           Relevant Medications    methylPREDNISolone acetate injection 40 mg (Completed)    ketorolac (TORADOL) 10 mg tablet    dexAMETHasone injection 4 mg (Completed)    Other Relevant Orders    X-Ray Lumbar Spine AP And Lateral (Completed)    Comprehensive Metabolic Panel    CBC Auto Differential    Sedimentation Rate    SULLY EIA w/ Reflex to dsDNA/MABLE    C-Reactive Protein     Other Visit Diagnoses       Positive depression screening        I have reviewed the positive depression score which warrants active treatment with psychotherapy and/or medications.            Health Maintenance Topics with due status: Not Due       Topic Last Completion Date    Influenza Vaccine 10/08/2022       Future Appointments   Date Time Provider Department Center   6/17/2024  1:30 PM Valeria Melendrez NP [facility]        Health Maintenance Due   Topic Date Due    COVID-19 Vaccine (1) Never done    Pneumococcal Vaccines (Age 0-64) (1 - PCV) Never done    HPV Vaccines (1 - 2-dose series) Never done    TETANUS VACCINE  Never done        No follow-ups on file.     Signature:  Valeria Melendrez NP  [address] [place]  [address]    Date of encounter: 7/18/23I have used clinical judgement based on duration and functional status to consider definite necessity for treatment.

## 2023-07-18 NOTE — ASSESSMENT & PLAN NOTE
She scored high on her PHQ today. States she thinks Pristiq may be helping some. Continue at current dosage. Follow up in 3 months to discuss. I did discuss counseling with patient and she declines this.

## 2023-07-18 NOTE — ASSESSMENT & PLAN NOTE
Xray obtained and was normal.   Will treat with Depomedrol/Decadron injection in clinic and oral Toradol as ordered. May take Zanaflex as needed for spasms.   Discussed PT with patient and she wishes to wait to see if symptoms improved with meds.   Patient was instructed to rest, take medications as directed, alternate ice/moist heat to area, and monitor for worsening symptoms that require immediate evaluation. Patient was instructed to follow up if symptoms persist past current treatment plan to discuss further options, such as physical therapy, referral to ortho or other diagnostic imaging. Medication side effects/risk/benefits/directions on taking medications were reviewed with patient. Patient verbalized understanding of treatment plan and denies any questions.

## 2023-07-19 ENCOUNTER — PATIENT MESSAGE (OUTPATIENT)
Dept: RESEARCH | Facility: HOSPITAL | Age: 28
End: 2023-07-19

## 2023-07-20 DIAGNOSIS — M54.42 ACUTE MIDLINE LOW BACK PAIN WITH BILATERAL SCIATICA: Primary | ICD-10-CM

## 2023-07-20 DIAGNOSIS — M54.41 ACUTE MIDLINE LOW BACK PAIN WITH BILATERAL SCIATICA: Primary | ICD-10-CM

## 2023-07-20 LAB — ANA SER QL: NEGATIVE

## 2023-07-20 NOTE — PROGRESS NOTES
Labs reviewed: ESR and CRP elevated which are some non specific inflammatory markers. I would like her to come back and have a rheumatoid factor drawn. Please contact patient and notify. Thanks.

## 2023-07-24 ENCOUNTER — PATIENT MESSAGE (OUTPATIENT)
Dept: RESEARCH | Facility: HOSPITAL | Age: 28
End: 2023-07-24

## 2023-07-24 ENCOUNTER — PATIENT MESSAGE (OUTPATIENT)
Dept: FAMILY MEDICINE | Facility: CLINIC | Age: 28
End: 2023-07-24
Payer: COMMERCIAL

## 2023-07-25 DIAGNOSIS — M54.41 ACUTE MIDLINE LOW BACK PAIN WITH BILATERAL SCIATICA: ICD-10-CM

## 2023-07-25 DIAGNOSIS — M54.42 ACUTE MIDLINE LOW BACK PAIN WITH BILATERAL SCIATICA: ICD-10-CM

## 2023-07-26 DIAGNOSIS — M54.41 ACUTE MIDLINE LOW BACK PAIN WITH BILATERAL SCIATICA: ICD-10-CM

## 2023-07-26 DIAGNOSIS — M54.42 ACUTE MIDLINE LOW BACK PAIN WITH BILATERAL SCIATICA: ICD-10-CM

## 2023-07-26 RX ORDER — BREXPIPRAZOLE 0.5 MG/1
0.5 TABLET ORAL DAILY
Qty: 30 TABLET | Refills: 0 | Status: SHIPPED | OUTPATIENT
Start: 2023-07-26 | End: 2023-08-22 | Stop reason: SDUPTHER

## 2023-07-26 RX ORDER — KETOROLAC TROMETHAMINE 10 MG/1
10 TABLET, FILM COATED ORAL EVERY 6 HOURS PRN
Qty: 30 TABLET | Refills: 0 | Status: SHIPPED | OUTPATIENT
Start: 2023-07-26 | End: 2023-07-31

## 2023-07-27 RX ORDER — KETOROLAC TROMETHAMINE 10 MG/1
TABLET, FILM COATED ORAL
Qty: 20 TABLET | Refills: 0 | OUTPATIENT
Start: 2023-07-27

## 2023-07-27 RX ORDER — KETOROLAC TROMETHAMINE 10 MG/1
10 TABLET, FILM COATED ORAL EVERY 6 HOURS
Qty: 20 TABLET | Refills: 0 | OUTPATIENT
Start: 2023-07-27 | End: 2023-08-01

## 2023-08-17 DIAGNOSIS — E55.9 VITAMIN D DEFICIENCY: ICD-10-CM

## 2023-08-22 RX ORDER — BREXPIPRAZOLE 0.5 MG/1
0.5 TABLET ORAL DAILY
Qty: 30 TABLET | Refills: 0 | Status: SHIPPED | OUTPATIENT
Start: 2023-08-22 | End: 2023-09-01 | Stop reason: SDUPTHER

## 2023-08-22 NOTE — TELEPHONE ENCOUNTER
Pt called regarding this refill request, she wanted to know if she needs to come in for a 1 month follow up since this was a new medication or if some refills could be sent in. She reported that the Rexulti and Pristiq seem to be working well.

## 2023-08-28 RX ORDER — ERGOCALCIFEROL 1.25 MG/1
50000 CAPSULE ORAL
Qty: 4 CAPSULE | Refills: 2 | Status: SHIPPED | OUTPATIENT
Start: 2023-08-28 | End: 2023-09-01 | Stop reason: SDUPTHER

## 2023-09-01 ENCOUNTER — OFFICE VISIT (OUTPATIENT)
Dept: FAMILY MEDICINE | Facility: CLINIC | Age: 28
End: 2023-09-01
Payer: COMMERCIAL

## 2023-09-01 VITALS
BODY MASS INDEX: 35.13 KG/M2 | HEART RATE: 78 BPM | WEIGHT: 237.19 LBS | TEMPERATURE: 99 F | SYSTOLIC BLOOD PRESSURE: 128 MMHG | DIASTOLIC BLOOD PRESSURE: 90 MMHG | HEIGHT: 69 IN | RESPIRATION RATE: 18 BRPM | OXYGEN SATURATION: 98 %

## 2023-09-01 DIAGNOSIS — E55.9 VITAMIN D DEFICIENCY: ICD-10-CM

## 2023-09-01 DIAGNOSIS — R53.83 FATIGUE, UNSPECIFIED TYPE: Primary | ICD-10-CM

## 2023-09-01 DIAGNOSIS — F33.0 MILD EPISODE OF RECURRENT MAJOR DEPRESSIVE DISORDER: ICD-10-CM

## 2023-09-01 DIAGNOSIS — J45.20 MILD INTERMITTENT ASTHMA, UNSPECIFIED WHETHER COMPLICATED: ICD-10-CM

## 2023-09-01 DIAGNOSIS — G43.111 INTRACTABLE MIGRAINE WITH AURA WITH STATUS MIGRAINOSUS: ICD-10-CM

## 2023-09-01 DIAGNOSIS — Z79.890 HORMONE REPLACEMENT THERAPY: ICD-10-CM

## 2023-09-01 LAB
T3FREE SERPL-MCNC: 3.25 PG/ML (ref 2.18–3.98)
T4 SERPL-MCNC: 12.7 ΜG/DL (ref 4.8–13.9)
TSH SERPL DL<=0.005 MIU/L-ACNC: 1.43 UIU/ML (ref 0.36–3.74)

## 2023-09-01 PROCEDURE — 3008F BODY MASS INDEX DOCD: CPT | Mod: ,,, | Performed by: NURSE PRACTITIONER

## 2023-09-01 PROCEDURE — 84436 T4: ICD-10-PCS | Mod: ,,, | Performed by: CLINICAL MEDICAL LABORATORY

## 2023-09-01 PROCEDURE — 84481 T3, FREE: ICD-10-PCS | Mod: ,,, | Performed by: CLINICAL MEDICAL LABORATORY

## 2023-09-01 PROCEDURE — 3074F SYST BP LT 130 MM HG: CPT | Mod: ,,, | Performed by: NURSE PRACTITIONER

## 2023-09-01 PROCEDURE — 84443 TSH: ICD-10-PCS | Mod: ,,, | Performed by: CLINICAL MEDICAL LABORATORY

## 2023-09-01 PROCEDURE — 3080F DIAST BP >= 90 MM HG: CPT | Mod: ,,, | Performed by: NURSE PRACTITIONER

## 2023-09-01 PROCEDURE — 3080F PR MOST RECENT DIASTOLIC BLOOD PRESSURE >= 90 MM HG: ICD-10-PCS | Mod: ,,, | Performed by: NURSE PRACTITIONER

## 2023-09-01 PROCEDURE — 84481 FREE ASSAY (FT-3): CPT | Mod: ,,, | Performed by: CLINICAL MEDICAL LABORATORY

## 2023-09-01 PROCEDURE — 84436 ASSAY OF TOTAL THYROXINE: CPT | Mod: ,,, | Performed by: CLINICAL MEDICAL LABORATORY

## 2023-09-01 PROCEDURE — 36415 COLL VENOUS BLD VENIPUNCTURE: CPT | Performed by: NURSE PRACTITIONER

## 2023-09-01 PROCEDURE — 1160F PR REVIEW ALL MEDS BY PRESCRIBER/CLIN PHARMACIST DOCUMENTED: ICD-10-PCS | Mod: ,,, | Performed by: NURSE PRACTITIONER

## 2023-09-01 PROCEDURE — 1159F MED LIST DOCD IN RCRD: CPT | Mod: ,,, | Performed by: NURSE PRACTITIONER

## 2023-09-01 PROCEDURE — 1159F PR MEDICATION LIST DOCUMENTED IN MEDICAL RECORD: ICD-10-PCS | Mod: ,,, | Performed by: NURSE PRACTITIONER

## 2023-09-01 PROCEDURE — 3008F PR BODY MASS INDEX (BMI) DOCUMENTED: ICD-10-PCS | Mod: ,,, | Performed by: NURSE PRACTITIONER

## 2023-09-01 PROCEDURE — 3074F PR MOST RECENT SYSTOLIC BLOOD PRESSURE < 130 MM HG: ICD-10-PCS | Mod: ,,, | Performed by: NURSE PRACTITIONER

## 2023-09-01 PROCEDURE — 1160F RVW MEDS BY RX/DR IN RCRD: CPT | Mod: ,,, | Performed by: NURSE PRACTITIONER

## 2023-09-01 PROCEDURE — 84443 ASSAY THYROID STIM HORMONE: CPT | Mod: ,,, | Performed by: CLINICAL MEDICAL LABORATORY

## 2023-09-01 PROCEDURE — 99213 PR OFFICE/OUTPT VISIT, EST, LEVL III, 20-29 MIN: ICD-10-PCS | Mod: ,,, | Performed by: NURSE PRACTITIONER

## 2023-09-01 PROCEDURE — 99213 OFFICE O/P EST LOW 20 MIN: CPT | Mod: ,,, | Performed by: NURSE PRACTITIONER

## 2023-09-01 RX ORDER — ALBUTEROL SULFATE 90 UG/1
1-2 AEROSOL, METERED RESPIRATORY (INHALATION) EVERY 6 HOURS PRN
Qty: 18 G | Refills: 1 | Status: SHIPPED | OUTPATIENT
Start: 2023-09-01

## 2023-09-01 RX ORDER — MONTELUKAST SODIUM 10 MG/1
TABLET ORAL
Qty: 30 TABLET | Refills: 11 | Status: SHIPPED | OUTPATIENT
Start: 2023-09-01

## 2023-09-01 RX ORDER — AMITRIPTYLINE HYDROCHLORIDE 10 MG/1
10 TABLET, FILM COATED ORAL NIGHTLY
COMMUNITY
Start: 2023-07-27 | End: 2023-11-22 | Stop reason: SDUPTHER

## 2023-09-01 RX ORDER — QUETIAPINE FUMARATE 25 MG/1
25 TABLET, FILM COATED ORAL DAILY
Qty: 30 TABLET | Refills: 2 | Status: SHIPPED | OUTPATIENT
Start: 2023-09-01 | End: 2023-09-18 | Stop reason: SINTOL

## 2023-09-01 RX ORDER — ERGOCALCIFEROL 1.25 MG/1
50000 CAPSULE ORAL
Qty: 4 CAPSULE | Refills: 2 | Status: SHIPPED | OUTPATIENT
Start: 2023-09-01

## 2023-09-01 RX ORDER — FLUTICASONE PROPIONATE AND SALMETEROL 250; 50 UG/1; UG/1
1 POWDER RESPIRATORY (INHALATION) DAILY PRN
Qty: 1 EACH | Refills: 3 | Status: SHIPPED | OUTPATIENT
Start: 2023-09-01

## 2023-09-01 RX ORDER — NABUMETONE 500 MG/1
500 TABLET, FILM COATED ORAL 2 TIMES DAILY PRN
COMMUNITY
Start: 2023-08-24

## 2023-09-01 RX ORDER — BUTALBITAL, ACETAMINOPHEN AND CAFFEINE 50; 325; 40 MG/1; MG/1; MG/1
1 TABLET ORAL EVERY 4 HOURS PRN
Qty: 30 TABLET | Refills: 0 | Status: SHIPPED | OUTPATIENT
Start: 2023-09-01 | End: 2023-10-01

## 2023-09-01 RX ORDER — ONDANSETRON 8 MG/1
8 TABLET, ORALLY DISINTEGRATING ORAL EVERY 6 HOURS PRN
Qty: 30 TABLET | Refills: 1 | Status: SHIPPED | OUTPATIENT
Start: 2023-09-01

## 2023-09-01 RX ORDER — KETOROLAC TROMETHAMINE 10 MG/1
10 TABLET, FILM COATED ORAL EVERY 6 HOURS PRN
COMMUNITY
Start: 2023-08-07 | End: 2024-01-24 | Stop reason: SDUPTHER

## 2023-09-01 RX ORDER — BREXPIPRAZOLE 0.5 MG/1
0.5 TABLET ORAL DAILY
Qty: 30 TABLET | Refills: 1 | Status: SHIPPED | OUTPATIENT
Start: 2023-09-01 | End: 2023-09-18 | Stop reason: DRUGHIGH

## 2023-09-01 RX ORDER — ESTRADIOL 2 MG/1
2 TABLET ORAL 2 TIMES DAILY
COMMUNITY
Start: 2023-08-04

## 2023-09-18 PROBLEM — J01.40 ACUTE NON-RECURRENT PANSINUSITIS: Status: RESOLVED | Noted: 2022-10-04 | Resolved: 2023-09-18

## 2023-09-21 RX ORDER — KETOROLAC TROMETHAMINE 10 MG/1
10 TABLET, FILM COATED ORAL EVERY 6 HOURS PRN
Qty: 30 TABLET | Refills: 0 | OUTPATIENT
Start: 2023-09-21

## 2023-09-21 RX ORDER — ARIPIPRAZOLE 5 MG/1
5 TABLET ORAL DAILY
Qty: 30 TABLET | Refills: 1 | Status: SHIPPED | OUTPATIENT
Start: 2023-09-21 | End: 2023-11-22

## 2023-09-21 NOTE — PROGRESS NOTES
Patient contacted clinic and stated insurance will not pay for her Rexulti. She is requesting to try alternative. Will send in Abilify.

## 2023-11-09 ENCOUNTER — HOSPITAL ENCOUNTER (EMERGENCY)
Facility: HOSPITAL | Age: 28
Discharge: HOME | End: 2023-11-09
Payer: COMMERCIAL

## 2023-11-09 VITALS
HEIGHT: 68 IN | SYSTOLIC BLOOD PRESSURE: 142 MMHG | TEMPERATURE: 99 F | OXYGEN SATURATION: 98 % | WEIGHT: 249 LBS | HEART RATE: 102 BPM | RESPIRATION RATE: 17 BRPM | BODY MASS INDEX: 37.74 KG/M2 | DIASTOLIC BLOOD PRESSURE: 78 MMHG

## 2023-11-09 DIAGNOSIS — R07.89 CHEST WALL PAIN: Primary | ICD-10-CM

## 2023-11-09 DIAGNOSIS — R07.9 CHEST PAIN: ICD-10-CM

## 2023-11-09 PROCEDURE — 63600175 PHARM REV CODE 636 W HCPCS: Performed by: NURSE PRACTITIONER

## 2023-11-09 PROCEDURE — 96372 THER/PROPH/DIAG INJ SC/IM: CPT | Performed by: NURSE PRACTITIONER

## 2023-11-09 PROCEDURE — 99284 PR EMERGENCY DEPT VISIT,LEVEL IV: ICD-10-PCS | Mod: ,,, | Performed by: NURSE PRACTITIONER

## 2023-11-09 PROCEDURE — 99284 EMERGENCY DEPT VISIT MOD MDM: CPT | Mod: 25

## 2023-11-09 PROCEDURE — 99284 EMERGENCY DEPT VISIT MOD MDM: CPT | Mod: ,,, | Performed by: NURSE PRACTITIONER

## 2023-11-09 RX ORDER — METHOCARBAMOL 500 MG/1
1000 TABLET, FILM COATED ORAL 3 TIMES DAILY
Qty: 30 TABLET | Refills: 0 | Status: SHIPPED | OUTPATIENT
Start: 2023-11-09 | End: 2023-11-14

## 2023-11-09 RX ORDER — ORPHENADRINE CITRATE 30 MG/ML
60 INJECTION INTRAMUSCULAR; INTRAVENOUS
Status: COMPLETED | OUTPATIENT
Start: 2023-11-09 | End: 2023-11-09

## 2023-11-09 RX ORDER — KETOROLAC TROMETHAMINE 30 MG/ML
60 INJECTION, SOLUTION INTRAMUSCULAR; INTRAVENOUS
Status: COMPLETED | OUTPATIENT
Start: 2023-11-09 | End: 2023-11-09

## 2023-11-09 RX ORDER — IBUPROFEN 800 MG/1
800 TABLET ORAL 3 TIMES DAILY
Qty: 15 TABLET | Refills: 0 | Status: SHIPPED | OUTPATIENT
Start: 2023-11-09 | End: 2023-11-14

## 2023-11-09 RX ADMIN — KETOROLAC TROMETHAMINE 60 MG: 30 INJECTION, SOLUTION INTRAMUSCULAR at 02:11

## 2023-11-09 RX ADMIN — ORPHENADRINE CITRATE 60 MG: 60 INJECTION INTRAMUSCULAR; INTRAVENOUS at 02:11

## 2023-11-09 NOTE — PROGRESS NOTES
Valeria Melendrez NP   [facility]  [address]  [place]  [address]      PATIENT NAME: Yeny Beaver  : 1995  DATE: 23  MRN: [id number]      Billing Provider: Valeria Melendrez NP  Level of Service:   Patient PCP Information       Provider PCP Type    Guille Parisi MD General            Reason for Visit / Chief Complaint: Medication Refill         History of Present Illness / Problem Focused Workflow     Yeny Beaver presents to the clinic with Medication Refill     27 year old female presents to clinic for check up and medication refill. States she would also like to have her thyroid levels checked due to frequent fatigue.         Review of Systems     @Review of Systems   Constitutional:  Positive for fatigue. Negative for activity change and unexpected weight change.   HENT:  Negative for hearing loss, rhinorrhea and trouble swallowing.    Eyes:  Negative for discharge and visual disturbance.   Respiratory:  Negative for chest tightness and wheezing.    Cardiovascular:  Negative for chest pain and palpitations.   Gastrointestinal:  Negative for blood in stool, constipation, diarrhea and vomiting.   Endocrine: Negative for polydipsia and polyuria.   Genitourinary:  Negative for difficulty urinating, dysuria, hematuria and menstrual problem.   Musculoskeletal:  Negative for arthralgias, joint swelling and neck pain.   Neurological:  Negative for weakness and headaches.   Psychiatric/Behavioral:  Positive for dysphoric mood. Negative for confusion.        Medical / Social / Family History     Past Medical History:   Diagnosis Date    Cardiac murmur     Endometriosis     Migraine headache 2019    PCOS (polycystic ovarian syndrome)     Periumbilical hernia 2020    repaired by Dr. Dwyer on 10/07/2020    Retroflexion of uterus 2018    3rd degree       Past Surgical History:   Procedure Laterality Date    CHOLECYSTECTOMY  3-    Cyst removed from cervix       Cyst removed from ovary      DILATION AND CURETTAGE OF UTERUS      HERNIA REPAIR      HYSTERECTOMY      LAPAROSCOPIC CHOLECYSTECTOMY N/A 03/09/2022    Procedure: CHOLECYSTECTOMY, LAPAROSCOPIC;  Surgeon: Denny Dwyer MD;  Location: [facility];  Service: General;  Laterality: N/A;    LOOP ELECTROSURGICAL EXCISION PROCEDURE (LEEP)      Operative Laparoscopy with lysis of adhesions      RH/BSO with limited cystoscopy  09/24/2018    TONSILLECTOMY  2018       Social History  Ms.  reports that she has never smoked. She has been exposed to tobacco smoke. She has never used smokeless tobacco. She reports current alcohol use of about 4.0 standard drinks of alcohol per week. She reports that she does not use drugs.    Family History  Ms.'s family history includes Breast cancer in an other family member; Diabetes in an other family member.    Medications and Allergies     Medications  Outpatient Medications Marked as Taking for the 9/1/23 encounter (Office Visit) with Valeria Melendrez NP   Medication Sig Dispense Refill    albuterol (PROVENTIL) 2.5 mg /3 mL (0.083 %) nebulizer solution Take 3 mLs (2.5 mg total) by nebulization every 4 to 6 hours as needed for Wheezing. 1 each 1    amitriptyline (ELAVIL) 10 MG tablet Take 10 mg by mouth every evening.      busPIRone (BUSPAR) 15 MG tablet Take 1 tablet (15 mg total) by mouth 3 (three) times daily. 90 tablet 11    cyanocobalamin 1,000 mcg/mL injection cyanocobalamin (vit B-12) 1,000 mcg/mL injection solution  INJECT 1 ML INTRAMUSCULARLY MONTHLY 1 mL 5    desvenlafaxine succinate (PRISTIQ) 100 MG Tb24 Take 1 tablet (100 mg total) by mouth once daily. 30 tablet 11    dicyclomine (BENTYL) 10 MG capsule TAKE ONE CAPSULE BY MOUTH FOUR TIMES DAILY BEFORE MEALS & NIGHTLY 120 capsule 2    EPINEPHrine (EPIPEN) 0.3 mg/0.3 mL AtIn epinephrine 0.3 mg/0.3 mL injection, auto-injector  USE AS DIRECTED FOR ACUTE ALLERGIC REACTION Strength: 0.3 mg/0.3 mL 1 each 1    estradioL (ESTRACE) 2 MG  tablet Take 2 mg by mouth 2 (two) times daily.      ketorolac (TORADOL) 10 mg tablet Take 10 mg by mouth every 6 (six) hours as needed.      nabumetone (RELAFEN) 500 MG tablet Take 500 mg by mouth 2 (two) times daily as needed.      omeprazole (PRILOSEC) 40 MG capsule Take 1 capsule (40 mg total) by mouth once daily. 30 capsule 11    promethazine (PHENERGAN) 12.5 MG Tab Take 12.5 mg by mouth 4 (four) times daily as needed.      tiZANidine (ZANAFLEX) 4 MG tablet TAKE 1 OR 2 TABLETS BY MOUTH AT BEDTIME AS NEEDED FOR NECK PAIN / SPASM      triamcinolone acetonide 0.1% (KENALOG) 0.1 % ointment Apply to affected area 2 TIMES A DAY, NO LONGER THEN 1 WEEK 454 g 0    [DISCONTINUED] albuterol (PROVENTIL/VENTOLIN HFA) 90 mcg/actuation inhaler Inhale 1-2 puffs into the lungs every 6 (six) hours as needed for Wheezing. Rescue 18 g 1    [DISCONTINUED] brexpiprazole (REXULTI) 0.5 mg Tab Take 0.5 mg by mouth once daily. 30 tablet 0    [DISCONTINUED] ergocalciferol (ERGOCALCIFEROL) 50,000 unit Cap TAKE ONE CAPSULE BY MOUTH EVERY 7 DAYS 4 capsule 2    [DISCONTINUED] fluticasone-salmeterol diskus inhaler 250-50 mcg Inhale 1 puff into the lungs daily as needed (wheezing). Controller 1 each 3    [DISCONTINUED] montelukast (SINGULAIR) 10 mg tablet montelukast 10 mg tablet  TAKE 1 TABLET BY MOUTH DAILY AT BEDTIME 30 tablet 11    [DISCONTINUED] ondansetron (ZOFRAN-ODT) 8 MG TbDL Take by mouth.         Allergies  Review of patient's allergies indicates:   Allergen Reactions    Adhesive      Adhesive on hormone patch    Allergen ext-venom-honey bee Other (See Comments)    Insect venom      Bee sting       Physical Examination     Vitals:    09/01/23 1114   BP: (!) 128/90   Pulse: 78   Resp: 18   Temp: 98.6 °F (37 °C)     Physical Exam  Vitals and nursing note reviewed.   Constitutional:       Appearance: She is obese.   HENT:      Head: Normocephalic.      Right Ear: Tympanic membrane normal.      Left Ear: Tympanic membrane normal.       Nose: Nose normal.      Mouth/Throat:      Mouth: Mucous membranes are moist.      Pharynx: Oropharynx is clear. No posterior oropharyngeal erythema.   Eyes:      Conjunctiva/sclera: Conjunctivae normal.   Cardiovascular:      Rate and Rhythm: Normal rate and regular rhythm.      Pulses: Normal pulses.      Heart sounds: Normal heart sounds.   Pulmonary:      Effort: Pulmonary effort is normal.      Breath sounds: Normal breath sounds.   Abdominal:      General: Abdomen is flat. Bowel sounds are normal. There is no distension.      Palpations: Abdomen is soft.   Musculoskeletal:         General: No swelling or tenderness. Normal range of motion.      Cervical back: Normal range of motion.      Right lower leg: No edema.      Left lower leg: No edema.   Skin:     General: Skin is warm and dry.      Capillary Refill: Capillary refill takes less than 2 seconds.   Neurological:      Mental Status: She is alert. Mental status is at baseline.   Psychiatric:         Mood and Affect: Mood normal.         Behavior: Behavior normal.               Lab Results   Component Value Date    WBC 8.70 07/18/2023    HGB 11.9 (L) 07/18/2023    HCT 37.3 (L) 07/18/2023    MCV 86.1 07/18/2023     (H) 07/18/2023        CMP  Sodium   Date Value Ref Range Status   07/18/2023 138 136 - 145 mmol/L Final     Potassium   Date Value Ref Range Status   07/18/2023 3.8 3.5 - 5.1 mmol/L Final     Chloride   Date Value Ref Range Status   07/18/2023 106 98 - 107 mmol/L Final     CO2   Date Value Ref Range Status   07/18/2023 28 21 - 32 mmol/L Final     Glucose   Date Value Ref Range Status   07/18/2023 70 (L) 74 - 106 mg/dL Final     BUN   Date Value Ref Range Status   07/18/2023 6 (L) 7 - 18 mg/dL Final     Creatinine   Date Value Ref Range Status   07/18/2023 0.57 0.55 - 1.02 mg/dL Final     Calcium   Date Value Ref Range Status   07/18/2023 9.0 8.5 - 10.1 mg/dL Final     Total Protein   Date Value Ref Range Status   07/25/2023 7.0 6.4 - 8.2  g/dL Final     Albumin   Date Value Ref Range Status   07/18/2023 3.2 (L) 3.5 - 5.0 g/dL Final     Bilirubin, Total   Date Value Ref Range Status   07/18/2023 0.2 >0.0 - 1.2 mg/dL Final     Alk Phos   Date Value Ref Range Status   07/18/2023 65 37 - 98 U/L Final     AST   Date Value Ref Range Status   07/18/2023 20 15 - 37 U/L Final     ALT   Date Value Ref Range Status   07/18/2023 24 13 - 56 U/L Final     Anion Gap   Date Value Ref Range Status   07/18/2023 8 7 - 16 mmol/L Final     eGFR   Date Value Ref Range Status   07/18/2023 130 >=60 mL/min/1.73m2 Final     Procedures   Assessment and Plan (including Health Maintenance)   :    Plan:           Problem List Items Addressed This Visit          Neuro    Migraine headache    Relevant Medications    amitriptyline (ELAVIL) 10 MG tablet    nabumetone (RELAFEN) 500 MG tablet    ketorolac (TORADOL) 10 mg tablet    ondansetron (ZOFRAN-ODT) 8 MG TbDL       Psychiatric    Current mild episode of major depressive disorder       Pulmonary    Mild intermittent asthma    Relevant Medications    albuterol (PROVENTIL/VENTOLIN HFA) 90 mcg/actuation inhaler    fluticasone-salmeterol diskus inhaler 250-50 mcg    montelukast (SINGULAIR) 10 mg tablet       Endocrine    Hormone replacement therapy    Relevant Medications    estradioL (ESTRACE) 2 MG tablet    Vitamin D deficiency    Relevant Medications    ergocalciferol (ERGOCALCIFEROL) 50,000 unit Cap     Other Visit Diagnoses       Fatigue, unspecified type    -  Primary    Relevant Orders    T4 (Completed)    TSH (Completed)    T3, Free (Completed)            The patient has no Health Maintenance topics of status Not Due    Future Appointments   Date Time Provider Department Center   6/17/2024  1:40 PM Valeria Melendrez, NP [facility] [place]        Health Maintenance Due   Topic Date Due    COVID-19 Vaccine (1) Never done    Pneumococcal Vaccines (Age 0-64) (1 - PCV) Never done    HPV Vaccines (1 - 2-dose series) Never  done    TETANUS VACCINE  Never done    Influenza Vaccine (1) 09/01/2023        No follow-ups on file.     Signature:  Valeria Melendrez NP  [address] [place]  [address]    Date of encounter: 9/1/23

## 2023-11-09 NOTE — ED PROVIDER NOTES
Encounter Date: 11/9/2023       History     Chief Complaint   Patient presents with    Pleurisy     Patient presents to ED with c/o pain to both sides of her ribs that started a couple weeks ago     27-year-old female presents to the emergency department to be evaluated for pain in her chest when she coughs.  She reports that she is been coughing for a couple of weeks.  Denies any shortness of breath.  Denies any exertional pain.    The history is provided by the patient.   Chest Pain  The current episode started several weeks ago. Primary symptoms include cough. Pertinent negatives for primary symptoms include no fever, no fatigue, no syncope, no shortness of breath, no wheezing, no palpitations, no abdominal pain, no nausea, no vomiting, no dizziness and no altered mental status.     Review of patient's allergies indicates:   Allergen Reactions    Adhesive      Adhesive on hormone patch    Allergen ext-venom-honey bee Other (See Comments)    Insect venom      Bee sting     Past Medical History:   Diagnosis Date    Cardiac murmur     Endometriosis     Migraine headache 11/13/2019    PCOS (polycystic ovarian syndrome)     Periumbilical hernia 08/20/2020    repaired by Dr. Dwyer on 10/07/2020    Retroflexion of uterus 07/31/2018    3rd degree     Past Surgical History:   Procedure Laterality Date    CHOLECYSTECTOMY  3-2022    Cyst removed from cervix      Cyst removed from ovary      DILATION AND CURETTAGE OF UTERUS      HERNIA REPAIR      HYSTERECTOMY      LAPAROSCOPIC CHOLECYSTECTOMY N/A 03/09/2022    Procedure: CHOLECYSTECTOMY, LAPAROSCOPIC;  Surgeon: Denny Dwyer MD;  Location: [facility];  Service: General;  Laterality: N/A;    LOOP ELECTROSURGICAL EXCISION PROCEDURE (LEEP)      Operative Laparoscopy with lysis of adhesions      RH/BSO with limited cystoscopy  09/24/2018    TONSILLECTOMY  2018     Family History   Problem Relation Age of Onset    Breast cancer Other     Diabetes Other      Social History      Tobacco Use    Smoking status: Never     Passive exposure: Current    Smokeless tobacco: Never   Substance Use Topics    Alcohol use: Yes     Alcohol/week: 4.0 standard drinks of alcohol     Types: 2 Glasses of wine, 2 Shots of liquor per week    Drug use: Never     Review of Systems   Constitutional:  Negative for fatigue and fever.   Respiratory:  Positive for cough. Negative for shortness of breath and wheezing.    Cardiovascular:  Positive for chest pain. Negative for palpitations and syncope.   Gastrointestinal:  Negative for abdominal pain, nausea and vomiting.   Neurological:  Negative for dizziness.   All other systems reviewed and are negative.      Physical Exam     Initial Vitals [11/09/23 1409]   BP Pulse Resp Temp SpO2   (!) 142/78 102 17 98.9 °F (37.2 °C) 98 %      MAP       --         Physical Exam    Vitals reviewed.  Constitutional: She appears well-developed and well-nourished.   Neck: Neck supple.   Cardiovascular:  Normal rate, regular rhythm and normal heart sounds.           Pulmonary/Chest: Breath sounds normal.   Abdominal: Abdomen is soft. Bowel sounds are normal. She exhibits no distension and no mass. There is no abdominal tenderness. There is no rebound and no guarding.   Musculoskeletal:         General: No tenderness or edema. Normal range of motion.      Cervical back: Neck supple.     Neurological: She is alert and oriented to person, place, and time. She has normal strength. GCS score is 15. GCS eye subscore is 4. GCS verbal subscore is 5. GCS motor subscore is 6.   Skin: Skin is warm and dry. Capillary refill takes less than 2 seconds.   Psychiatric: She has a normal mood and affect.         Medical Screening Exam   See Full Note    ED Course   Procedures  Labs Reviewed - No data to display       Imaging Results              X-Ray Chest PA And Lateral (Final result)  Result time 11/09/23 14:14:48      Final result by Carmine Song MD (11/09/23 14:14:48)                    Impression:      No acute findings.      Electronically signed by: Carmine Song  Date:    11/09/2023  Time:    14:14               Narrative:    EXAMINATION:  XR CHEST PA AND LATERAL    CLINICAL HISTORY:  Chest pain, unspecified    TECHNIQUE:  PA and lateral views of the chest were performed.    COMPARISON:  None    FINDINGS:  Heart size normal.  Lungs clear.  No pneumothorax or pleural effusion.                                       Medications - No data to display  Medical Decision Making  27-year-old female presents to the emergency department to be evaluated for pain in her chest when she coughs.  She reports that she is been coughing for a couple of weeks.  Denies any shortness of breath.  Denies any exertional pain.  Chest x-ray ordered, reviewed as well as radiologist's interpretation that was significant for no acute process  Diagnosis: Chest wall pain  Prescribed Motrin and Robaxin    Amount and/or Complexity of Data Reviewed  Radiology: ordered.                               Clinical Impression:   Final diagnoses:  [R07.9] Chest pain  [R07.89] Chest wall pain (Primary)        ED Disposition Condition    Discharge Stable          ED Prescriptions       Medication Sig Dispense Start Date End Date Auth. Provider    methocarbamoL (ROBAXIN) 500 MG Tab Take 2 tablets (1,000 mg total) by mouth 3 (three) times daily. for 5 days 30 tablet 11/9/2023 11/14/2023 Cielo Ferreira FNP    ibuprofen (ADVIL,MOTRIN) 800 MG tablet Take 1 tablet (800 mg total) by mouth 3 (three) times daily. for 5 days 15 tablet 11/9/2023 11/14/2023 Cielo Ferreira FNP          Follow-up Information    None          Cielo Ferreira FNP  11/09/23 8971

## 2023-11-10 ENCOUNTER — TELEPHONE (OUTPATIENT)
Dept: EMERGENCY MEDICINE | Facility: HOSPITAL | Age: 28
End: 2023-11-10
Payer: COMMERCIAL

## 2023-11-22 ENCOUNTER — OFFICE VISIT (OUTPATIENT)
Dept: FAMILY MEDICINE | Facility: CLINIC | Age: 28
End: 2023-11-22
Payer: COMMERCIAL

## 2023-11-22 VITALS
SYSTOLIC BLOOD PRESSURE: 106 MMHG | HEIGHT: 68 IN | TEMPERATURE: 98 F | RESPIRATION RATE: 17 BRPM | OXYGEN SATURATION: 98 % | BODY MASS INDEX: 37.89 KG/M2 | WEIGHT: 250 LBS | DIASTOLIC BLOOD PRESSURE: 74 MMHG | HEART RATE: 89 BPM

## 2023-11-22 DIAGNOSIS — G43.111 INTRACTABLE MIGRAINE WITH AURA WITH STATUS MIGRAINOSUS: ICD-10-CM

## 2023-11-22 DIAGNOSIS — M94.0 COSTOCHONDRITIS: Primary | ICD-10-CM

## 2023-11-22 PROCEDURE — 99213 PR OFFICE/OUTPT VISIT, EST, LEVL III, 20-29 MIN: ICD-10-PCS | Mod: 25,,, | Performed by: NURSE PRACTITIONER

## 2023-11-22 PROCEDURE — 3078F PR MOST RECENT DIASTOLIC BLOOD PRESSURE < 80 MM HG: ICD-10-PCS | Mod: ,,, | Performed by: NURSE PRACTITIONER

## 2023-11-22 PROCEDURE — 96372 THER/PROPH/DIAG INJ SC/IM: CPT | Mod: ,,, | Performed by: NURSE PRACTITIONER

## 2023-11-22 PROCEDURE — 1159F MED LIST DOCD IN RCRD: CPT | Mod: ,,, | Performed by: NURSE PRACTITIONER

## 2023-11-22 PROCEDURE — 99213 OFFICE O/P EST LOW 20 MIN: CPT | Mod: 25,,, | Performed by: NURSE PRACTITIONER

## 2023-11-22 PROCEDURE — 3074F SYST BP LT 130 MM HG: CPT | Mod: ,,, | Performed by: NURSE PRACTITIONER

## 2023-11-22 PROCEDURE — 3008F BODY MASS INDEX DOCD: CPT | Mod: ,,, | Performed by: NURSE PRACTITIONER

## 2023-11-22 PROCEDURE — 3008F PR BODY MASS INDEX (BMI) DOCUMENTED: ICD-10-PCS | Mod: ,,, | Performed by: NURSE PRACTITIONER

## 2023-11-22 PROCEDURE — 96372 PR INJECTION,THERAP/PROPH/DIAG2ST, IM OR SUBCUT: ICD-10-PCS | Mod: ,,, | Performed by: NURSE PRACTITIONER

## 2023-11-22 PROCEDURE — 3078F DIAST BP <80 MM HG: CPT | Mod: ,,, | Performed by: NURSE PRACTITIONER

## 2023-11-22 PROCEDURE — 3074F PR MOST RECENT SYSTOLIC BLOOD PRESSURE < 130 MM HG: ICD-10-PCS | Mod: ,,, | Performed by: NURSE PRACTITIONER

## 2023-11-22 PROCEDURE — 1159F PR MEDICATION LIST DOCUMENTED IN MEDICAL RECORD: ICD-10-PCS | Mod: ,,, | Performed by: NURSE PRACTITIONER

## 2023-11-22 RX ORDER — BUTALBITAL, ACETAMINOPHEN AND CAFFEINE 300; 40; 50 MG/1; MG/1; MG/1
1 CAPSULE ORAL EVERY 4 HOURS PRN
COMMUNITY
Start: 2023-09-01

## 2023-11-22 RX ORDER — PANTOPRAZOLE SODIUM 40 MG/1
40 TABLET, DELAYED RELEASE ORAL DAILY
Qty: 30 TABLET | Refills: 11 | Status: SHIPPED | OUTPATIENT
Start: 2023-11-22 | End: 2024-01-24 | Stop reason: ALTCHOICE

## 2023-11-22 RX ORDER — AMITRIPTYLINE HYDROCHLORIDE 10 MG/1
10 TABLET, FILM COATED ORAL NIGHTLY
Qty: 30 TABLET | Refills: 3 | Status: SHIPPED | OUTPATIENT
Start: 2023-11-22 | End: 2024-01-24

## 2023-11-22 RX ORDER — ARIPIPRAZOLE 5 MG/1
5 TABLET ORAL DAILY
Qty: 60 TABLET | Refills: 3 | Status: SHIPPED | OUTPATIENT
Start: 2023-11-22 | End: 2023-11-22 | Stop reason: SDUPTHER

## 2023-11-22 RX ORDER — ARIPIPRAZOLE 5 MG/1
10 TABLET ORAL DAILY
Qty: 60 TABLET | Refills: 3 | Status: SHIPPED | OUTPATIENT
Start: 2023-11-22 | End: 2023-12-12 | Stop reason: SDUPTHER

## 2023-11-22 RX ORDER — SCOLOPAMINE TRANSDERMAL SYSTEM 1 MG/1
1 PATCH, EXTENDED RELEASE TRANSDERMAL
COMMUNITY
Start: 2023-09-07

## 2023-11-22 RX ORDER — PREDNISONE 10 MG/1
TABLET ORAL
Qty: 13 TABLET | Refills: 0 | Status: SHIPPED | OUTPATIENT
Start: 2023-11-22 | End: 2024-01-24 | Stop reason: SDUPTHER

## 2023-11-22 RX ORDER — KETOROLAC TROMETHAMINE 30 MG/ML
30 INJECTION, SOLUTION INTRAMUSCULAR; INTRAVENOUS
Status: COMPLETED | OUTPATIENT
Start: 2023-11-22 | End: 2023-11-22

## 2023-11-22 RX ORDER — KETOROLAC TROMETHAMINE 30 MG/ML
30 INJECTION, SOLUTION INTRAMUSCULAR; INTRAVENOUS
Status: DISCONTINUED | OUTPATIENT
Start: 2023-11-22 | End: 2023-11-22

## 2023-11-22 RX ORDER — ARIPIPRAZOLE 5 MG/1
5 TABLET ORAL DAILY
Qty: 30 TABLET | Status: CANCELLED | OUTPATIENT
Start: 2023-11-22

## 2023-11-22 RX ADMIN — KETOROLAC TROMETHAMINE 30 MG: 30 INJECTION, SOLUTION INTRAMUSCULAR; INTRAVENOUS at 12:11

## 2023-12-12 RX ORDER — ARIPIPRAZOLE 10 MG/1
10 TABLET ORAL DAILY
Qty: 60 TABLET | Refills: 3 | Status: SHIPPED | OUTPATIENT
Start: 2023-12-12 | End: 2024-01-24

## 2023-12-12 RX ORDER — ARIPIPRAZOLE 5 MG/1
10 TABLET ORAL DAILY
Qty: 60 TABLET | Refills: 3 | Status: CANCELLED | OUTPATIENT
Start: 2023-12-12 | End: 2024-12-11

## 2023-12-12 NOTE — TELEPHONE ENCOUNTER
Patient requesting medicine be written as 10mg daily instead of 5mg 2 tabs daily.    Patient comment: Can this be written for the 10mg once daily? They wont pay for me to take 2 of the 5mg bid

## 2023-12-25 PROBLEM — M94.0 COSTOCHONDRITIS: Status: ACTIVE | Noted: 2023-12-25

## 2023-12-26 NOTE — PROGRESS NOTES
Valeria Melendrez NP   [id number]  [place]  [address]      PATIENT NAME: Yeny Beaver  : 1995  DATE: 23  MRN: [id number]      Billing Provider: Valeria Melendrez NP  Level of Service: PA OFFICE/OUTPT VISIT, EST, LEVL III, 20-29 MIN  Patient PCP Information       Provider PCP Type    Guille Parisi MD General            Reason for Visit / Chief Complaint: Chest Pain (Pt c/o pain to her left side, was seen at [facility] on 2023 and was told it was caused by costochondritis. Xray was performed at the ER and was WNL. Pt states that the pain is worse when she sits. )         History of Present Illness / Problem Focused Workflow     28 year old female presents to clinic with complaints of musculoskeletal pain to left rib area. States she was seen at [facility] on 2023 and was told it was caused by costochondritis. Xray was performed at the ER and was WNL. Pt states that the pain is worse when she sits. She states she had been sick with respiratory infection the week prior and had been coughing a lot. Cough has improved but ribs are still painful.             Review of Systems     @Review of Systems   Constitutional:  Negative for activity change, appetite change, fatigue and fever.   HENT:  Negative for nasal congestion, ear pain, rhinorrhea, sinus pressure/congestion and sore throat.    Eyes:  Negative for pain, redness, visual disturbance and eye dryness.   Respiratory:  Negative for cough and shortness of breath.    Cardiovascular:  Negative for chest pain and leg swelling.   Gastrointestinal:  Negative for abdominal distention, abdominal pain, constipation and diarrhea.   Endocrine: Negative for cold intolerance, heat intolerance and polyuria.   Genitourinary:  Negative for bladder incontinence, dysuria, frequency and urgency.   Musculoskeletal:  Negative for arthralgias, gait problem and myalgias.        Left rib area pain   Integumentary:   Negative for color change, rash and wound.   Allergic/Immunologic: Negative for environmental allergies and food allergies.   Neurological:  Negative for dizziness, weakness, light-headedness and headaches.   Psychiatric/Behavioral:  Negative for behavioral problems and sleep disturbance.        Medical / Social / Family History     Past Medical History:   Diagnosis Date    Cardiac murmur     Endometriosis     Migraine headache 11/13/2019    PCOS (polycystic ovarian syndrome)     Periumbilical hernia 08/20/2020    repaired by Dr. Dwyer on 10/07/2020    Retroflexion of uterus 07/31/2018    3rd degree       Past Surgical History:   Procedure Laterality Date    CHOLECYSTECTOMY  3-2022    Cyst removed from cervix      Cyst removed from ovary      DILATION AND CURETTAGE OF UTERUS      HERNIA REPAIR      HYSTERECTOMY      LAPAROSCOPIC CHOLECYSTECTOMY N/A 03/09/2022    Procedure: CHOLECYSTECTOMY, LAPAROSCOPIC;  Surgeon: Denny Dwyer MD;  Location: [facility];  Service: General;  Laterality: N/A;    LOOP ELECTROSURGICAL EXCISION PROCEDURE (LEEP)      Operative Laparoscopy with lysis of adhesions      RH/BSO with limited cystoscopy  09/24/2018    TONSILLECTOMY  2018       Medications and Allergies     Medications  Outpatient Medications Marked as Taking for the 11/22/23 encounter (Office Visit) with Valeria Melendrez NP   Medication Sig Dispense Refill    albuterol (PROVENTIL) 2.5 mg /3 mL (0.083 %) nebulizer solution Take 3 mLs (2.5 mg total) by nebulization every 4 to 6 hours as needed for Wheezing. 1 each 1    albuterol (PROVENTIL/VENTOLIN HFA) 90 mcg/actuation inhaler Inhale 1-2 puffs into the lungs every 6 (six) hours as needed for Wheezing. Rescue 18 g 1    busPIRone (BUSPAR) 15 MG tablet Take 1 tablet (15 mg total) by mouth 3 (three) times daily. 90 tablet 11    butalbital-acetaminophen-caff -40 mg Cap Take 1 capsule by mouth every 4 (four) hours as needed (migraine).      cyanocobalamin 1,000 mcg/mL  injection cyanocobalamin (vit B-12) 1,000 mcg/mL injection solution  INJECT 1 ML INTRAMUSCULARLY MONTHLY 1 mL 5    desvenlafaxine succinate (PRISTIQ) 100 MG Tb24 Take 1 tablet (100 mg total) by mouth once daily. 30 tablet 11    dicyclomine (BENTYL) 10 MG capsule TAKE ONE CAPSULE BY MOUTH FOUR TIMES DAILY BEFORE MEALS & NIGHTLY 120 capsule 2    EPINEPHrine (EPIPEN) 0.3 mg/0.3 mL AtIn epinephrine 0.3 mg/0.3 mL injection, auto-injector  USE AS DIRECTED FOR ACUTE ALLERGIC REACTION Strength: 0.3 mg/0.3 mL 1 each 1    ergocalciferol (ERGOCALCIFEROL) 50,000 unit Cap Take 1 capsule (50,000 Units total) by mouth every 7 days. 4 capsule 2    estradioL (ESTRACE) 2 MG tablet Take 2 mg by mouth 2 (two) times daily.      fluticasone-salmeterol diskus inhaler 250-50 mcg Inhale 1 puff into the lungs daily as needed (wheezing). Controller 1 each 3    ketorolac (TORADOL) 10 mg tablet Take 10 mg by mouth every 6 (six) hours as needed.      montelukast (SINGULAIR) 10 mg tablet montelukast 10 mg tablet  TAKE 1 TABLET BY MOUTH DAILY AT BEDTIME 30 tablet 11    nabumetone (RELAFEN) 500 MG tablet Take 500 mg by mouth 2 (two) times daily as needed.      ondansetron (ZOFRAN-ODT) 8 MG TbDL Take 1 tablet (8 mg total) by mouth every 6 (six) hours as needed (nausea). 30 tablet 1    promethazine (PHENERGAN) 12.5 MG Tab Take 12.5 mg by mouth 4 (four) times daily as needed.      scopolamine (TRANSDERM-SCOP) 1.3-1.5 mg (1 mg over 3 days) Place 1 patch onto the skin Every 3 (three) days.      tiZANidine (ZANAFLEX) 4 MG tablet TAKE 1 OR 2 TABLETS BY MOUTH AT BEDTIME AS NEEDED FOR NECK PAIN / SPASM      triamcinolone acetonide 0.1% (KENALOG) 0.1 % ointment Apply to affected area 2 TIMES A DAY, NO LONGER THEN 1 WEEK 454 g 0    [DISCONTINUED] amitriptyline (ELAVIL) 10 MG tablet Take 10 mg by mouth every evening.      [DISCONTINUED] ARIPiprazole (ABILIFY) 5 MG Tab Take 1 tablet (5 mg total) by mouth once daily. 30 tablet 1    [DISCONTINUED] omeprazole  (PRILOSEC) 40 MG capsule Take 1 capsule (40 mg total) by mouth once daily. 30 capsule 11       Allergies  Review of patient's allergies indicates:   Allergen Reactions    Adhesive      Adhesive on hormone patch    Allergen ext-venom-honey bee Other (See Comments)    Insect venom      Bee sting       Physical Examination     Vitals:    11/22/23 1127   BP: 106/74   Pulse: 89   Resp: 17   Temp: 97.8 °F (36.6 °C)     Physical Exam  Vitals and nursing note reviewed.   HENT:      Head: Normocephalic.      Right Ear: Tympanic membrane normal.      Left Ear: Tympanic membrane normal.      Nose: Nose normal.      Mouth/Throat:      Mouth: Mucous membranes are moist.      Pharynx: Oropharynx is clear. No posterior oropharyngeal erythema.   Eyes:      Conjunctiva/sclera: Conjunctivae normal.   Cardiovascular:      Rate and Rhythm: Normal rate and regular rhythm.      Pulses: Normal pulses.      Heart sounds: Normal heart sounds.   Pulmonary:      Effort: Pulmonary effort is normal.      Breath sounds: Normal breath sounds.   Chest:      Chest wall: Tenderness present.   Abdominal:      General: Abdomen is flat. Bowel sounds are normal. There is no distension.      Palpations: Abdomen is soft.   Musculoskeletal:         General: No swelling or tenderness. Normal range of motion.      Cervical back: Normal range of motion.      Right lower leg: No edema.      Left lower leg: No edema.   Skin:     General: Skin is warm and dry.      Capillary Refill: Capillary refill takes less than 2 seconds.   Neurological:      Mental Status: She is alert. Mental status is at baseline.   Psychiatric:         Mood and Affect: Mood normal.         Behavior: Behavior normal.               Lab Results   Component Value Date    WBC 8.70 07/18/2023    HGB 11.9 (L) 07/18/2023    HCT 37.3 (L) 07/18/2023    MCV 86.1 07/18/2023     (H) 07/18/2023        CMP  Sodium   Date Value Ref Range Status   07/18/2023 138 136 - 145 mmol/L Final      Potassium   Date Value Ref Range Status   07/18/2023 3.8 3.5 - 5.1 mmol/L Final     Chloride   Date Value Ref Range Status   07/18/2023 106 98 - 107 mmol/L Final     CO2   Date Value Ref Range Status   07/18/2023 28 21 - 32 mmol/L Final     Glucose   Date Value Ref Range Status   07/18/2023 70 (L) 74 - 106 mg/dL Final     BUN   Date Value Ref Range Status   07/18/2023 6 (L) 7 - 18 mg/dL Final     Creatinine   Date Value Ref Range Status   07/18/2023 0.57 0.55 - 1.02 mg/dL Final     Calcium   Date Value Ref Range Status   07/18/2023 9.0 8.5 - 10.1 mg/dL Final     Total Protein   Date Value Ref Range Status   07/25/2023 7.0 6.4 - 8.2 g/dL Final     Albumin   Date Value Ref Range Status   07/18/2023 3.2 (L) 3.5 - 5.0 g/dL Final     Bilirubin, Total   Date Value Ref Range Status   07/18/2023 0.2 >0.0 - 1.2 mg/dL Final     Alk Phos   Date Value Ref Range Status   07/18/2023 65 37 - 98 U/L Final     AST   Date Value Ref Range Status   07/18/2023 20 15 - 37 U/L Final     ALT   Date Value Ref Range Status   07/18/2023 24 13 - 56 U/L Final     Anion Gap   Date Value Ref Range Status   07/18/2023 8 7 - 16 mmol/L Final     eGFR   Date Value Ref Range Status   07/18/2023 130 >=60 mL/min/1.73m2 Final     Procedures   Assessment and Plan (including Health Maintenance)   :    Plan:     Problem List Items Addressed This Visit          Neuro    Migraine headache    Relevant Medications    amitriptyline (ELAVIL) 10 MG tablet       Orthopedic    Costochondritis - Primary       The patient has no Health Maintenance topics of status Not Due    Future Appointments   Date Time Provider Department Center   6/17/2024  1:40 PM Valeria Melendrez NP [facility]        Health Maintenance Due   Topic Date Due    COVID-19 Vaccine (1) Never done    Pneumococcal Vaccines (Age 0-64) (1 - PCV) Never done    HPV Vaccines (1 - 2-dose series) Never done    TETANUS VACCINE  Never done    Influenza Vaccine (1) 09/01/2023           Signature:  Valeria Melendrez NP  [address] [id number]  [place]  [address]    Date of encounter: 11/22/23

## 2024-01-24 ENCOUNTER — OFFICE VISIT (OUTPATIENT)
Dept: FAMILY MEDICINE | Facility: CLINIC | Age: 29
End: 2024-01-24
Payer: COMMERCIAL

## 2024-01-24 VITALS
RESPIRATION RATE: 18 BRPM | OXYGEN SATURATION: 97 % | SYSTOLIC BLOOD PRESSURE: 114 MMHG | HEIGHT: 68 IN | WEIGHT: 252.63 LBS | DIASTOLIC BLOOD PRESSURE: 77 MMHG | HEART RATE: 85 BPM | TEMPERATURE: 98 F | BODY MASS INDEX: 38.29 KG/M2

## 2024-01-24 DIAGNOSIS — G47.00 INSOMNIA, UNSPECIFIED TYPE: ICD-10-CM

## 2024-01-24 DIAGNOSIS — M94.0 COSTOCHONDRITIS: Primary | ICD-10-CM

## 2024-01-24 DIAGNOSIS — J06.9 UPPER RESPIRATORY TRACT INFECTION, UNSPECIFIED TYPE: ICD-10-CM

## 2024-01-24 DIAGNOSIS — G43.111 INTRACTABLE MIGRAINE WITH AURA WITH STATUS MIGRAINOSUS: ICD-10-CM

## 2024-01-24 DIAGNOSIS — K21.9 GASTROESOPHAGEAL REFLUX DISEASE WITHOUT ESOPHAGITIS: ICD-10-CM

## 2024-01-24 PROCEDURE — 99213 OFFICE O/P EST LOW 20 MIN: CPT | Mod: 25,,, | Performed by: NURSE PRACTITIONER

## 2024-01-24 PROCEDURE — 96372 THER/PROPH/DIAG INJ SC/IM: CPT | Mod: ,,, | Performed by: NURSE PRACTITIONER

## 2024-01-24 RX ORDER — OMEPRAZOLE 40 MG/1
40 CAPSULE, DELAYED RELEASE ORAL EVERY MORNING
COMMUNITY
Start: 2023-12-30

## 2024-01-24 RX ORDER — TRAZODONE HYDROCHLORIDE 50 MG/1
50 TABLET ORAL NIGHTLY
Qty: 30 TABLET | Refills: 2 | Status: SHIPPED | OUTPATIENT
Start: 2024-01-24 | End: 2024-03-25

## 2024-01-24 RX ORDER — AMOXICILLIN 500 MG/1
500 TABLET, FILM COATED ORAL EVERY 12 HOURS
Qty: 20 TABLET | Refills: 0 | Status: SHIPPED | OUTPATIENT
Start: 2024-01-24 | End: 2024-02-03

## 2024-01-24 RX ORDER — KETOROLAC TROMETHAMINE 30 MG/ML
30 INJECTION, SOLUTION INTRAMUSCULAR; INTRAVENOUS
Status: COMPLETED | OUTPATIENT
Start: 2024-01-24 | End: 2024-01-24

## 2024-01-24 RX ORDER — PREDNISONE 10 MG/1
TABLET ORAL
Qty: 13 TABLET | Refills: 0 | Status: SHIPPED | OUTPATIENT
Start: 2024-01-24 | End: 2024-04-02 | Stop reason: ALTCHOICE

## 2024-01-24 RX ORDER — KETOROLAC TROMETHAMINE 10 MG/1
10 TABLET, FILM COATED ORAL EVERY 6 HOURS PRN
Qty: 30 TABLET | Refills: 0 | Status: SHIPPED | OUTPATIENT
Start: 2024-01-24 | End: 2024-05-08

## 2024-01-24 RX ADMIN — KETOROLAC TROMETHAMINE 30 MG: 30 INJECTION, SOLUTION INTRAMUSCULAR; INTRAVENOUS at 10:01

## 2024-01-24 NOTE — PROGRESS NOTES
Valeria Melendrez NP   [facility]  [address]  [place]  [address]      PATIENT NAME: Yeny Beaver  : 1995  DATE: 24  MRN: [id number]      Billing Provider: Valeria Mleendrez NP  Level of Service: [place] OFFICE/OUTPT VISIT, EST, LEVL III, 20-29 MIN  Patient PCP Information       Provider PCP Type    Guille Parisi MD General            Reason for Visit / Chief Complaint: Chest Pain (Patient has Costcochrondritis states she is having a flare up. Denies any facial weakness or arm pain. She does report she usually takes Prednisone during the episodes. Areas affected as well are her ribs) and Rib Cage pain         History of Present Illness / Problem Focused Workflow     28 year old female presents to clinic with complaints of muscloskelatal pain to left side of ribs and sternum. States she has history of costcochrondritis and this feels similar. She was treated for similar problem in November. States it improved after steroid usage but she has started getting sick again and coughing and it has flared back up. She complains of nasal congestion, sinus pressure and headache as well as dry cough.         Review of Systems     @Review of Systems   Constitutional:  Negative for activity change, appetite change, fatigue and fever.   HENT:  Positive for nasal congestion, rhinorrhea and sinus pressure/congestion. Negative for ear pain and sore throat.    Eyes:  Negative for pain, redness, visual disturbance and eye dryness.   Respiratory:  Positive for cough. Negative for shortness of breath.    Cardiovascular:  Negative for chest pain and leg swelling.   Gastrointestinal:  Negative for abdominal distention, abdominal pain, constipation and diarrhea.   Endocrine: Negative for cold intolerance, heat intolerance and polyuria.   Genitourinary:  Negative for bladder incontinence, dysuria, frequency and urgency.   Musculoskeletal:  Negative for arthralgias, gait problem and myalgias.    Integumentary:  Negative for color change, rash and wound.   Allergic/Immunologic: Negative for environmental allergies and food allergies.   Neurological:  Negative for dizziness, weakness, light-headedness and headaches.   Psychiatric/Behavioral:  Negative for behavioral problems and sleep disturbance.        Medical / Social / Family History     Past Medical History:   Diagnosis Date    Cardiac murmur     Endometriosis     Migraine headache 11/13/2019    PCOS (polycystic ovarian syndrome)     Periumbilical hernia 08/20/2020    repaired by Dr. Dwyer on 10/07/2020    Retroflexion of uterus 07/31/2018    3rd degree       Past Surgical History:   Procedure Laterality Date    CHOLECYSTECTOMY  3-2022    Cyst removed from cervix      Cyst removed from ovary      DILATION AND CURETTAGE OF UTERUS      HERNIA REPAIR      HYSTERECTOMY      LAPAROSCOPIC CHOLECYSTECTOMY N/A 03/09/2022    Procedure: CHOLECYSTECTOMY, LAPAROSCOPIC;  Surgeon: Denny Dwyer MD;  Location: [facility];  Service: General;  Laterality: N/A;    LOOP ELECTROSURGICAL EXCISION PROCEDURE (LEEP)      Operative Laparoscopy with lysis of adhesions      RH/BSO with limited cystoscopy  09/24/2018    TONSILLECTOMY  2018       Medications and Allergies     Medications  Outpatient Medications Marked as Taking for the 1/24/24 encounter (Office Visit) with Valerai Melendrez NP   Medication Sig Dispense Refill    albuterol (PROVENTIL) 2.5 mg /3 mL (0.083 %) nebulizer solution Take 3 mLs (2.5 mg total) by nebulization every 4 to 6 hours as needed for Wheezing. 1 each 1    albuterol (PROVENTIL/VENTOLIN HFA) 90 mcg/actuation inhaler Inhale 1-2 puffs into the lungs every 6 (six) hours as needed for Wheezing. Rescue 18 g 1    busPIRone (BUSPAR) 15 MG tablet Take 1 tablet (15 mg total) by mouth 3 (three) times daily. 90 tablet 11    butalbital-acetaminophen-caff -40 mg Cap Take 1 capsule by mouth every 4 (four) hours as needed (migraine).      cyanocobalamin  1,000 mcg/mL injection cyanocobalamin (vit B-12) 1,000 mcg/mL injection solution  INJECT 1 ML INTRAMUSCULARLY MONTHLY 1 mL 5    desvenlafaxine succinate (PRISTIQ) 100 MG Tb24 Take 1 tablet (100 mg total) by mouth once daily. 30 tablet 11    dicyclomine (BENTYL) 10 MG capsule TAKE ONE CAPSULE BY MOUTH FOUR TIMES DAILY BEFORE MEALS & NIGHTLY 120 capsule 2    EPINEPHrine (EPIPEN) 0.3 mg/0.3 mL AtIn epinephrine 0.3 mg/0.3 mL injection, auto-injector  USE AS DIRECTED FOR ACUTE ALLERGIC REACTION Strength: 0.3 mg/0.3 mL 1 each 1    ergocalciferol (ERGOCALCIFEROL) 50,000 unit Cap Take 1 capsule (50,000 Units total) by mouth every 7 days. 4 capsule 2    estradioL (ESTRACE) 2 MG tablet Take 2 mg by mouth 2 (two) times daily.      fluticasone-salmeterol diskus inhaler 250-50 mcg Inhale 1 puff into the lungs daily as needed (wheezing). Controller 1 each 3    montelukast (SINGULAIR) 10 mg tablet montelukast 10 mg tablet  TAKE 1 TABLET BY MOUTH DAILY AT BEDTIME 30 tablet 11    nabumetone (RELAFEN) 500 MG tablet Take 500 mg by mouth 2 (two) times daily as needed.      omeprazole (PRILOSEC) 40 MG capsule Take 40 mg by mouth every morning.      ondansetron (ZOFRAN-ODT) 8 MG TbDL Take 1 tablet (8 mg total) by mouth every 6 (six) hours as needed (nausea). 30 tablet 1    promethazine (PHENERGAN) 12.5 MG Tab Take 12.5 mg by mouth 4 (four) times daily as needed.      scopolamine (TRANSDERM-SCOP) 1.3-1.5 mg (1 mg over 3 days) Place 1 patch onto the skin Every 3 (three) days.      tiZANidine (ZANAFLEX) 4 MG tablet TAKE 1 OR 2 TABLETS BY MOUTH AT BEDTIME AS NEEDED FOR NECK PAIN / SPASM      triamcinolone acetonide 0.1% (KENALOG) 0.1 % ointment Apply to affected area 2 TIMES A DAY, NO LONGER THEN 1 WEEK 454 g 0    [DISCONTINUED] amitriptyline (ELAVIL) 10 MG tablet Take 1 tablet (10 mg total) by mouth every evening. 30 tablet 3    [DISCONTINUED] ARIPiprazole (ABILIFY) 10 MG Tab Take 1 tablet (10 mg total) by mouth once daily. 60 tablet 3     [DISCONTINUED] ketorolac (TORADOL) 10 mg tablet Take 10 mg by mouth every 6 (six) hours as needed.      [DISCONTINUED] predniSONE (DELTASONE) 10 MG tablet Take 3 tabs daily x 2 days, then 2 tabs daily x 2 days, then 1 tab daily x 2 days, then half tab daily x 2 days. 13 tablet 0     Current Facility-Administered Medications for the 1/24/24 encounter (Office Visit) with Valeria Melendrez NP   Medication Dose Route Frequency Provider Last Rate Last Admin    [COMPLETED] ketorolac injection 30 mg  30 mg Intramuscular 1 time in Clinic/HOD Valeria Melendrez NP   30 mg at 01/24/24 1011       Allergies  Review of patient's allergies indicates:   Allergen Reactions    Adhesive      Adhesive on hormone patch    Allergen ext-venom-honey bee Other (See Comments)    Insect venom      Bee sting       Physical Examination     Vitals:    01/24/24 0938   BP: 114/77   Pulse: 85   Resp: 18   Temp: 98 °F (36.7 °C)     Physical Exam  Vitals and nursing note reviewed.   Constitutional:       Appearance: Normal appearance.   HENT:      Head: Normocephalic.      Right Ear: Tympanic membrane normal.      Left Ear: Tympanic membrane normal.      Nose: Congestion and rhinorrhea present. Rhinorrhea is purulent.      Right Turbinates: Pale.      Left Turbinates: Pale.      Right Sinus: Maxillary sinus tenderness and frontal sinus tenderness present.      Left Sinus: Maxillary sinus tenderness and frontal sinus tenderness present.      Mouth/Throat:      Lips: Pink.      Mouth: Mucous membranes are moist.      Pharynx: Oropharyngeal exudate (clear post nasal drainage.) and posterior oropharyngeal erythema present.   Eyes:      Conjunctiva/sclera: Conjunctivae normal.   Cardiovascular:      Rate and Rhythm: Normal rate and regular rhythm.      Pulses: Normal pulses.      Heart sounds: Normal heart sounds.   Pulmonary:      Effort: Pulmonary effort is normal.      Breath sounds: Normal breath sounds. No wheezing or rhonchi.   Chest:      Chest  wall: Tenderness present.       Abdominal:      General: Abdomen is flat. Bowel sounds are normal. There is no distension.      Palpations: Abdomen is soft.      Tenderness: There is no abdominal tenderness.   Musculoskeletal:         General: Normal range of motion.      Cervical back: Normal range of motion.   Skin:     General: Skin is warm and dry.      Capillary Refill: Capillary refill takes less than 2 seconds.   Neurological:      General: No focal deficit present.      Mental Status: She is alert and oriented to person, place, and time. Mental status is at baseline.   Psychiatric:         Mood and Affect: Mood normal.         Behavior: Behavior normal.               Lab Results   Component Value Date    WBC 8.70 07/18/2023    HGB 11.9 (L) 07/18/2023    HCT 37.3 (L) 07/18/2023    MCV 86.1 07/18/2023     (H) 07/18/2023        CMP  Sodium   Date Value Ref Range Status   07/18/2023 138 136 - 145 mmol/L Final     Potassium   Date Value Ref Range Status   07/18/2023 3.8 3.5 - 5.1 mmol/L Final     Chloride   Date Value Ref Range Status   07/18/2023 106 98 - 107 mmol/L Final     CO2   Date Value Ref Range Status   07/18/2023 28 21 - 32 mmol/L Final     Glucose   Date Value Ref Range Status   07/18/2023 70 (L) 74 - 106 mg/dL Final     BUN   Date Value Ref Range Status   07/18/2023 6 (L) 7 - 18 mg/dL Final     Creatinine   Date Value Ref Range Status   07/18/2023 0.57 0.55 - 1.02 mg/dL Final     Calcium   Date Value Ref Range Status   07/18/2023 9.0 8.5 - 10.1 mg/dL Final     Total Protein   Date Value Ref Range Status   07/25/2023 7.0 6.4 - 8.2 g/dL Final     Albumin   Date Value Ref Range Status   07/18/2023 3.2 (L) 3.5 - 5.0 g/dL Final     Bilirubin, Total   Date Value Ref Range Status   07/18/2023 0.2 >0.0 - 1.2 mg/dL Final     Alk Phos   Date Value Ref Range Status   07/18/2023 65 37 - 98 U/L Final     AST   Date Value Ref Range Status   07/18/2023 20 15 - 37 U/L Final     ALT   Date Value Ref Range  Status   07/18/2023 24 13 - 56 U/L Final     Anion Gap   Date Value Ref Range Status   07/18/2023 8 7 - 16 mmol/L Final     eGFR   Date Value Ref Range Status   07/18/2023 130 >=60 mL/min/1.73m2 Final     Procedures   Assessment and Plan (including Health Maintenance)   :    Plan:     Problem List Items Addressed This Visit          Neuro    Migraine headache       GI    Gastroesophageal reflux disease without esophagitis    Relevant Medications    omeprazole (PRILOSEC) 40 MG capsule       Orthopedic    Costochondritis - Primary    Relevant Medications    ketorolac (TORADOL) 10 mg tablet    predniSONE (DELTASONE) 10 MG tablet    ketorolac injection 30 mg (Completed)     Other Visit Diagnoses       Upper respiratory tract infection, unspecified type        Relevant Medications    amoxicillin (AMOXIL) 500 MG Tab    predniSONE (DELTASONE) 10 MG tablet    Insomnia, unspecified type        Relevant Medications    traZODone (DESYREL) 50 MG tablet            The patient has no Health Maintenance topics of status Not Due    Future Appointments   Date Time Provider Department Center   6/17/2024  1:40 PM Valeria Melendrez NP [facility]        Health Maintenance Due   Topic Date Due    COVID-19 Vaccine (1) Never done    Pneumococcal Vaccines (Age 0-64) (1 - PCV) Never done    HPV Vaccines (1 - 2-dose series) Never done    TETANUS VACCINE  Never done    Influenza Vaccine (1) 09/01/2023          Signature:  Valeria Melendrez NP  [address] [place]  [address]    Date of encounter: 1/24/24

## 2024-02-08 ENCOUNTER — OFFICE VISIT (OUTPATIENT)
Dept: FAMILY MEDICINE | Facility: CLINIC | Age: 29
End: 2024-02-08
Payer: COMMERCIAL

## 2024-02-08 VITALS
OXYGEN SATURATION: 97 % | SYSTOLIC BLOOD PRESSURE: 110 MMHG | DIASTOLIC BLOOD PRESSURE: 80 MMHG | WEIGHT: 255.63 LBS | RESPIRATION RATE: 18 BRPM | HEART RATE: 88 BPM | HEIGHT: 68 IN | TEMPERATURE: 98 F | BODY MASS INDEX: 38.74 KG/M2

## 2024-02-08 DIAGNOSIS — R10.31 RIGHT LOWER QUADRANT ABDOMINAL PAIN: Primary | ICD-10-CM

## 2024-02-08 LAB
BILIRUB SERPL-MCNC: NEGATIVE MG/DL
BLOOD URINE, POC: ABNORMAL
COLOR, POC UA: YELLOW
GLUCOSE UR QL STRIP: NEGATIVE
KETONES UR QL STRIP: NEGATIVE
LEUKOCYTE ESTERASE URINE, POC: NEGATIVE
NITRITE, POC UA: NEGATIVE
PH, POC UA: 6
PROTEIN, POC: NEGATIVE
SPECIFIC GRAVITY, POC UA: 1.02
UROBILINOGEN, POC UA: 0.2

## 2024-02-08 PROCEDURE — 3074F SYST BP LT 130 MM HG: CPT | Mod: ,,, | Performed by: NURSE PRACTITIONER

## 2024-02-08 PROCEDURE — 3079F DIAST BP 80-89 MM HG: CPT | Mod: ,,, | Performed by: NURSE PRACTITIONER

## 2024-02-08 PROCEDURE — 1160F RVW MEDS BY RX/DR IN RCRD: CPT | Mod: ,,, | Performed by: NURSE PRACTITIONER

## 2024-02-08 PROCEDURE — 99213 OFFICE O/P EST LOW 20 MIN: CPT | Mod: ,,, | Performed by: NURSE PRACTITIONER

## 2024-02-08 PROCEDURE — 1159F MED LIST DOCD IN RCRD: CPT | Mod: ,,, | Performed by: NURSE PRACTITIONER

## 2024-02-08 PROCEDURE — 81003 URINALYSIS AUTO W/O SCOPE: CPT | Mod: QW,,, | Performed by: NURSE PRACTITIONER

## 2024-02-08 PROCEDURE — 3008F BODY MASS INDEX DOCD: CPT | Mod: ,,, | Performed by: NURSE PRACTITIONER

## 2024-02-08 NOTE — PROGRESS NOTES
I discussed care gaps with patient. She states she has all the vaccines except flu and they were done at [facility].

## 2024-02-09 ENCOUNTER — HOSPITAL ENCOUNTER (OUTPATIENT)
Dept: RADIOLOGY | Facility: HOSPITAL | Age: 29
Discharge: HOME | End: 2024-02-09
Attending: NURSE PRACTITIONER
Payer: COMMERCIAL

## 2024-02-09 DIAGNOSIS — R10.31 RIGHT LOWER QUADRANT ABDOMINAL PAIN: ICD-10-CM

## 2024-02-09 PROCEDURE — 76700 US EXAM ABDOM COMPLETE: CPT | Mod: TC

## 2024-02-10 NOTE — PROGRESS NOTES
Abdominal Ultrasound reviewed: Findings were normal with exception of mild fatty liver. She needs to follow low fat/low cholesterol diet and increase exercise to help this. However, I do not feel this is the cause of her current right lower abdominal pain and nausea. WE can refer to GI if symptoms continue or if she wants us to go ahead and refer to GI just let me know. Follow up as needed.

## 2024-02-15 PROBLEM — R10.31 RIGHT LOWER QUADRANT ABDOMINAL PAIN: Status: ACTIVE | Noted: 2024-02-15

## 2024-02-16 NOTE — ASSESSMENT & PLAN NOTE
UA normal. Will send for Abdominal US for further eval. She has Zofran to take at home PRN nausea.

## 2024-02-16 NOTE — PROGRESS NOTES
Valeria Melendrez NP   [facility]  [address]  [place]  [address]      PATIENT NAME: Yeny Beaver  : 1995  DATE: 24  MRN: [id number]      Billing Provider: Valeria Melendrez NP  Level of Service: [place] OFFICE/OUTPT VISIT, EST, LEVL III, 20-29 MIN  Patient PCP Information       Provider PCP Type    Guille Parisi MD General            Reason for Visit / Chief Complaint: Abdominal Pain (Patient states Right flank pain that radiates over to belly button. Patient states every time she eats she gets nauseated to the point of gagging. She says she had her gallbladder removed. Hurts if jumps or moves around too much. X 2-3 weeks.)         History of Present Illness / Problem Focused Workflow     28 year old female presents to clinic with complaints of right flank pain that radiates over to belly button which has been present for 2-3 weeks. Patient states every time she eats she gets nauseated to the point of gagging. She says she had her gallbladder removed. Hurts if jumps or moves around too much.         Review of Systems     @Review of Systems   Constitutional:  Negative for activity change, appetite change, fatigue and fever.   HENT:  Negative for nasal congestion, ear pain, rhinorrhea, sinus pressure/congestion and sore throat.    Eyes:  Negative for pain, redness, visual disturbance and eye dryness.   Respiratory:  Negative for cough and shortness of breath.    Cardiovascular:  Negative for chest pain and leg swelling.   Gastrointestinal:  Positive for abdominal pain and nausea. Negative for abdominal distention, constipation and diarrhea.   Endocrine: Negative for cold intolerance, heat intolerance and polyuria.   Genitourinary:  Negative for bladder incontinence, dysuria, frequency and urgency.   Musculoskeletal:  Negative for arthralgias, gait problem and myalgias.   Integumentary:  Negative for color change, rash and wound.   Allergic/Immunologic: Negative for environmental  allergies and food allergies.   Neurological:  Negative for dizziness, weakness, light-headedness and headaches.   Psychiatric/Behavioral:  Negative for behavioral problems and sleep disturbance.        Medical / Social / Family History     Past Medical History:   Diagnosis Date    Cardiac murmur     Endometriosis     Migraine headache 11/13/2019    PCOS (polycystic ovarian syndrome)     Periumbilical hernia 08/20/2020    repaired by Dr. Dwyer on 10/07/2020    Retroflexion of uterus 07/31/2018    3rd degree       Past Surgical History:   Procedure Laterality Date    CHOLECYSTECTOMY  3-2022    Cyst removed from cervix      Cyst removed from ovary      DILATION AND CURETTAGE OF UTERUS      HERNIA REPAIR      HYSTERECTOMY      LAPAROSCOPIC CHOLECYSTECTOMY N/A 03/09/2022    Procedure: CHOLECYSTECTOMY, LAPAROSCOPIC;  Surgeon: Denny Dwyer MD;  Location: [facility];  Service: General;  Laterality: N/A;    LOOP ELECTROSURGICAL EXCISION PROCEDURE (LEEP)      Operative Laparoscopy with lysis of adhesions      RH/BSO with limited cystoscopy  09/24/2018    TONSILLECTOMY  2018       Medications and Allergies     Medications  Outpatient Medications Marked as Taking for the 2/8/24 encounter (Office Visit) with Valeria Melendrez NP   Medication Sig Dispense Refill    albuterol (PROVENTIL) 2.5 mg /3 mL (0.083 %) nebulizer solution Take 3 mLs (2.5 mg total) by nebulization every 4 to 6 hours as needed for Wheezing. 1 each 1    albuterol (PROVENTIL/VENTOLIN HFA) 90 mcg/actuation inhaler Inhale 1-2 puffs into the lungs every 6 (six) hours as needed for Wheezing. Rescue 18 g 1    busPIRone (BUSPAR) 15 MG tablet Take 1 tablet (15 mg total) by mouth 3 (three) times daily. 90 tablet 11    butalbital-acetaminophen-caff -40 mg Cap Take 1 capsule by mouth every 4 (four) hours as needed (migraine).      cyanocobalamin 1,000 mcg/mL injection cyanocobalamin (vit B-12) 1,000 mcg/mL injection solution  INJECT 1 ML INTRAMUSCULARLY MONTHLY  1 mL 5    desvenlafaxine succinate (PRISTIQ) 100 MG Tb24 Take 1 tablet (100 mg total) by mouth once daily. 30 tablet 11    dicyclomine (BENTYL) 10 MG capsule TAKE ONE CAPSULE BY MOUTH FOUR TIMES DAILY BEFORE MEALS & NIGHTLY 120 capsule 2    EPINEPHrine (EPIPEN) 0.3 mg/0.3 mL AtIn epinephrine 0.3 mg/0.3 mL injection, auto-injector  USE AS DIRECTED FOR ACUTE ALLERGIC REACTION Strength: 0.3 mg/0.3 mL 1 each 1    ergocalciferol (ERGOCALCIFEROL) 50,000 unit Cap Take 1 capsule (50,000 Units total) by mouth every 7 days. 4 capsule 2    estradioL (ESTRACE) 2 MG tablet Take 2 mg by mouth 2 (two) times daily.      fluticasone-salmeterol diskus inhaler 250-50 mcg Inhale 1 puff into the lungs daily as needed (wheezing). Controller 1 each 3    ketorolac (TORADOL) 10 mg tablet Take 1 tablet (10 mg total) by mouth every 6 (six) hours as needed for Pain. 30 tablet 0    montelukast (SINGULAIR) 10 mg tablet montelukast 10 mg tablet  TAKE 1 TABLET BY MOUTH DAILY AT BEDTIME 30 tablet 11    nabumetone (RELAFEN) 500 MG tablet Take 500 mg by mouth 2 (two) times daily as needed.      omeprazole (PRILOSEC) 40 MG capsule Take 40 mg by mouth every morning.      ondansetron (ZOFRAN-ODT) 8 MG TbDL Take 1 tablet (8 mg total) by mouth every 6 (six) hours as needed (nausea). 30 tablet 1    promethazine (PHENERGAN) 12.5 MG Tab Take 12.5 mg by mouth 4 (four) times daily as needed.      scopolamine (TRANSDERM-SCOP) 1.3-1.5 mg (1 mg over 3 days) Place 1 patch onto the skin Every 3 (three) days.      tiZANidine (ZANAFLEX) 4 MG tablet TAKE 1 OR 2 TABLETS BY MOUTH AT BEDTIME AS NEEDED FOR NECK PAIN / SPASM      traZODone (DESYREL) 50 MG tablet Take 1 tablet (50 mg total) by mouth every evening. 30 tablet 2    triamcinolone acetonide 0.1% (KENALOG) 0.1 % ointment Apply to affected area 2 TIMES A DAY, NO LONGER THEN 1 WEEK 454 g 0       Allergies  Review of patient's allergies indicates:   Allergen Reactions    Adhesive      Adhesive on hormone patch     Allergen ext-venom-honey bee Other (See Comments)    Insect venom      Bee sting       Physical Examination     Vitals:    02/08/24 1410   BP: 110/80   Pulse: 88   Resp: 18   Temp: 97.8 °F (36.6 °C)     Physical Exam  Vitals and nursing note reviewed.   Constitutional:       Appearance: She is obese.   HENT:      Head: Normocephalic.      Right Ear: Tympanic membrane normal.      Left Ear: Tympanic membrane normal.      Nose: Nose normal.      Mouth/Throat:      Mouth: Mucous membranes are moist.      Pharynx: Oropharynx is clear. No posterior oropharyngeal erythema.   Eyes:      Conjunctiva/sclera: Conjunctivae normal.   Cardiovascular:      Rate and Rhythm: Normal rate and regular rhythm.      Pulses: Normal pulses.      Heart sounds: Normal heart sounds.   Pulmonary:      Effort: Pulmonary effort is normal.      Breath sounds: Normal breath sounds.   Abdominal:      General: Bowel sounds are normal. There is no distension.      Palpations: Abdomen is soft.      Tenderness: There is abdominal tenderness in the right lower quadrant.   Musculoskeletal:         General: No swelling or tenderness. Normal range of motion.      Cervical back: Normal range of motion.      Right lower leg: No edema.      Left lower leg: No edema.   Skin:     General: Skin is warm and dry.      Capillary Refill: Capillary refill takes less than 2 seconds.   Neurological:      Mental Status: She is alert. Mental status is at baseline.   Psychiatric:         Mood and Affect: Mood normal.         Behavior: Behavior normal.               Lab Results   Component Value Date    WBC 8.70 07/18/2023    HGB 11.9 (L) 07/18/2023    HCT 37.3 (L) 07/18/2023    MCV 86.1 07/18/2023     (H) 07/18/2023        CMP  Sodium   Date Value Ref Range Status   07/18/2023 138 136 - 145 mmol/L Final     Potassium   Date Value Ref Range Status   07/18/2023 3.8 3.5 - 5.1 mmol/L Final     Chloride   Date Value Ref Range Status   07/18/2023 106 98 - 107 mmol/L  Final     CO2   Date Value Ref Range Status   07/18/2023 28 21 - 32 mmol/L Final     Glucose   Date Value Ref Range Status   07/18/2023 70 (L) 74 - 106 mg/dL Final     BUN   Date Value Ref Range Status   07/18/2023 6 (L) 7 - 18 mg/dL Final     Creatinine   Date Value Ref Range Status   07/18/2023 0.57 0.55 - 1.02 mg/dL Final     Calcium   Date Value Ref Range Status   07/18/2023 9.0 8.5 - 10.1 mg/dL Final     Total Protein   Date Value Ref Range Status   07/25/2023 7.0 6.4 - 8.2 g/dL Final     Albumin   Date Value Ref Range Status   07/18/2023 3.2 (L) 3.5 - 5.0 g/dL Final     Bilirubin, Total   Date Value Ref Range Status   07/18/2023 0.2 >0.0 - 1.2 mg/dL Final     Alk Phos   Date Value Ref Range Status   07/18/2023 65 37 - 98 U/L Final     AST   Date Value Ref Range Status   07/18/2023 20 15 - 37 U/L Final     ALT   Date Value Ref Range Status   07/18/2023 24 13 - 56 U/L Final     Anion Gap   Date Value Ref Range Status   07/18/2023 8 7 - 16 mmol/L Final     eGFR   Date Value Ref Range Status   07/18/2023 130 >=60 mL/min/1.73m2 Final     Procedures   Assessment and Plan (including Health Maintenance)   :    Plan:     Problem List Items Addressed This Visit          GI    Right lower quadrant abdominal pain - Primary    Current Assessment & Plan     UA normal. Will send for Abdominal US for further eval. She has Zofran to take at home PRN nausea.          Relevant Orders    POCT URINALYSIS W/O SCOPE (Completed)    US Abdomen Complete (Completed)       The patient has no Health Maintenance topics of status Not Due    Future Appointments   Date Time Provider Department Center   6/17/2024  1:40 PM [facility]        Health Maintenance Due   Topic Date Due    COVID-19 Vaccine (1) Never done    Pneumococcal Vaccines (Age 0-64) (1 of 2 - PCV) Never done    HPV Vaccines (1 - 2-dose series) Never done    TETANUS VACCINE  Never done    Influenza Vaccine (1) 09/01/2023          Signature:   Valeria Melendrez NP  [address] [place]  [address]    Date of encounter: 2/8/24

## 2024-02-26 DIAGNOSIS — R10.31 RIGHT LOWER QUADRANT ABDOMINAL PAIN: Primary | ICD-10-CM

## 2024-02-26 NOTE — PROGRESS NOTES
Patient contacted clinic stating she continues to have right lower quad pain at times and continues to be nauseated with eating. She is requesting referral to GI.

## 2024-02-28 ENCOUNTER — OFFICE VISIT (OUTPATIENT)
Dept: GASTROENTEROLOGY | Facility: CLINIC | Age: 29
End: 2024-02-28
Payer: COMMERCIAL

## 2024-02-28 VITALS
BODY MASS INDEX: 38.65 KG/M2 | HEIGHT: 68 IN | SYSTOLIC BLOOD PRESSURE: 157 MMHG | RESPIRATION RATE: 18 BRPM | DIASTOLIC BLOOD PRESSURE: 88 MMHG | WEIGHT: 255 LBS | HEART RATE: 74 BPM

## 2024-02-28 DIAGNOSIS — R10.31 RIGHT LOWER QUADRANT PAIN: Primary | ICD-10-CM

## 2024-02-28 DIAGNOSIS — R10.31 RIGHT LOWER QUADRANT ABDOMINAL PAIN: ICD-10-CM

## 2024-02-28 PROCEDURE — 99215 OFFICE O/P EST HI 40 MIN: CPT | Mod: PBBFAC

## 2024-02-28 PROCEDURE — 99214 OFFICE O/P EST MOD 30 MIN: CPT | Mod: S$PBB,,,

## 2024-02-28 PROCEDURE — 1159F MED LIST DOCD IN RCRD: CPT | Mod: ,,,

## 2024-02-28 PROCEDURE — 3079F DIAST BP 80-89 MM HG: CPT | Mod: ,,,

## 2024-02-28 PROCEDURE — 1160F RVW MEDS BY RX/DR IN RCRD: CPT | Mod: ,,,

## 2024-02-28 PROCEDURE — 3077F SYST BP >= 140 MM HG: CPT | Mod: ,,,

## 2024-02-28 PROCEDURE — 3008F BODY MASS INDEX DOCD: CPT | Mod: ,,,

## 2024-03-08 NOTE — PROGRESS NOTES
Gastroenterology Clinic Note    Patient ID: [id number]   Referring MD: Valeria Melendrez, NP   Chief Complaint:   Chief Complaint   Patient presents with    Abdominal Pain     Nausea, right side pain(has had gallbladder removed), also dizzyness when eats sweets         History of Present Illness   Yeny Beaver is an 28 y.o. female who is referred for abdominal pain. Patient reports right sided abdominal pain over the past 2-3 months. Symptoms have been progressively worsening. She is s/p cholecystectomy 03/2022. She reports pain is postprandial. She does have a history of GERD controlled on Prilosec 40 mg daily.     Previous Work-up: US [facility]    Review of Systems   Constitutional:  Negative for weight loss.   Gastrointestinal:  Positive for abdominal pain and nausea. Negative for blood in stool, constipation, diarrhea, heartburn, melena and vomiting.       Past Medical History      Past Medical History:   Diagnosis Date    Cardiac murmur     Endometriosis     Migraine headache 11/13/2019    PCOS (polycystic ovarian syndrome)     Periumbilical hernia 08/20/2020    repaired by Dr. Dwyer on 10/07/2020    Retroflexion of uterus 07/31/2018    3rd degree       Past Surgical History     Past Surgical History:   Procedure Laterality Date    CHOLECYSTECTOMY  3-2022    Cyst removed from cervix      Cyst removed from ovary      DILATION AND CURETTAGE OF UTERUS      HERNIA REPAIR      HYSTERECTOMY      LAPAROSCOPIC CHOLECYSTECTOMY N/A 03/09/2022    Procedure: CHOLECYSTECTOMY, LAPAROSCOPIC;  Surgeon: Denny Dwyer MD;  Location: [facility];  Service: General;  Laterality: N/A;    LOOP ELECTROSURGICAL EXCISION PROCEDURE (LEEP)      Operative Laparoscopy with lysis of adhesions      RH/BSO with limited cystoscopy  09/24/2018    TONSILLECTOMY  2018       Allergies     Review of patient's allergies indicates:   Allergen Reactions    Adhesive      Adhesive on hormone patch    Allergen ext-venom-honey bee Other (See Comments)     Insect venom      Bee sting       Immunization History     Immunization History   Administered Date(s) Administered    Influenza 10/19/2021, 10/08/2022       Past Family History      Family History   Problem Relation Age of Onset    Cancer Mother     No Known Problems Father     Asthma Brother     Breast cancer Other     Diabetes Other        Past Social History      Social History     Socioeconomic History    Marital status:    Tobacco Use    Smoking status: Never     Passive exposure: Current    Smokeless tobacco: Never   Substance and Sexual Activity    Alcohol use: Yes     Alcohol/week: 4.0 standard drinks of alcohol     Types: 2 Glasses of wine, 2 Shots of liquor per week     Comment: occ    Drug use: Never    Sexual activity: Yes     Partners: Male     Birth control/protection: See Surgical Hx   Social History Narrative    Lives at home with .       Current Medications     Outpatient Medications Marked as Taking for the 2/28/24 encounter (Office Visit) with Nava Anna FNP   Medication Sig Dispense Refill    busPIRone (BUSPAR) 15 MG tablet Take 1 tablet (15 mg total) by mouth 3 (three) times daily. 90 tablet 11    desvenlafaxine succinate (PRISTIQ) 100 MG Tb24 Take 1 tablet (100 mg total) by mouth once daily. 30 tablet 11    ergocalciferol (ERGOCALCIFEROL) 50,000 unit Cap Take 1 capsule (50,000 Units total) by mouth every 7 days. 4 capsule 2    estradioL (ESTRACE) 2 MG tablet Take 2 mg by mouth 2 (two) times daily.      montelukast (SINGULAIR) 10 mg tablet montelukast 10 mg tablet  TAKE 1 TABLET BY MOUTH DAILY AT BEDTIME 30 tablet 11    omeprazole (PRILOSEC) 40 MG capsule Take 40 mg by mouth every morning.      traZODone (DESYREL) 50 MG tablet Take 1 tablet (50 mg total) by mouth every evening. 30 tablet 2        I have reviewed the current medications, allergies, vital signs, past medical and surgical history, family medical history, and social history for this encounter and agree with  "all findings.    OBJECTIVE    Physical Exam    BP (!) 157/88   Pulse 74   Resp 18   Ht 5' 8" (1.727 m)   Wt 115.7 kg (255 lb)   LMP  (LMP Unknown)   BMI 38.77 kg/m²   GEN: Well appearing, cooperative, NAD  NECK: Supple, no LAD  CV: Normal rate  RESP: Unlabored  ABD: ND,  no guarding  EXT: No clubbing, cyanosis, or edema  SKIN: Warm and dry  NEURO: AAO x4.     LABS    CBC (with or without Differential):   Lab Results   Component Value Date    WBC 8.58 02/28/2024    HGB 12.6 02/28/2024    HCT 37.2 (L) 02/28/2024    MCV 82.7 02/28/2024    MCH 28.0 02/28/2024    MCHC 33.9 02/28/2024    RDW 11.9 02/28/2024     (H) 02/28/2024    MPV 10.0 02/28/2024    NEUTOPHILPCT 62.8 02/28/2024    DIFFTYPE Auto 02/28/2024     BMP/CMP:   Lab Results   Component Value Date     02/28/2024    K 4.5 02/28/2024     02/28/2024    CO2 31 02/28/2024    BUN 9 02/28/2024    CREATININE 0.70 02/28/2024    GLU 96 02/28/2024    CALCIUM 9.3 02/28/2024    ALBUMIN 3.3 (L) 02/28/2024    AST 19 02/28/2024    ALT 20 02/28/2024    ALKPHOS 61 02/28/2024        IMAGING    US Abdomen Complete  - No acute findings. Mild fatty liver.       ASSESSMENT  Yeny Beaver is a 28 y.o. female with history of GERD and depression who is referred for abdominal pain.     1. Right lower quadrant pain    2. Right lower quadrant abdominal pain           PLAN    - CT Abdomen and pelvis for abdominal pain  - EGD if CT is unremarkable   There are no Patient Instructions on file for this visit.      Orders Placed This Encounter   Procedures    CT Abdomen Pelvis With IV Contrast Routine Oral Contrast     Standing Status:   Future     Standing Expiration Date:   2/28/2025     Order Specific Question:   Does this patient have impaired renal function?     Answer:   No     Order Specific Question:   May the Radiologist modify the order per protocol to meet the clinical needs of the patient?     Answer:   Yes     Order Specific Question:   Oral/Rectal " Contrast instructions:     Answer:   Routine Oral Contrast    CBC Auto Differential     Standing Status:   Future     Number of Occurrences:   1     Standing Expiration Date:   4/28/2025    Comprehensive Metabolic Panel     Standing Status:   Future     Number of Occurrences:   1     Standing Expiration Date:   4/28/2025         The risks and benefits of my recommendations, as well as other treatment options were discussed with the patient today. All questions were answered.    35 minutes of total time spent on the encounter, which includes face to face time and non-face to face time preparing to see the patient (eg, review of tests), obtaining and/or reviewing separately obtained history, documenting clinical information in the electronic or other health record, Independently interpreting results (not separately reported) and communicating results to the patient/family/caregiver, or care coordination (not separately reported).        Nava Anna, FNP/ACNP  [facility]

## 2024-03-19 ENCOUNTER — HOSPITAL ENCOUNTER (OUTPATIENT)
Dept: RADIOLOGY | Facility: HOSPITAL | Age: 29
Discharge: HOME | End: 2024-03-19
Payer: COMMERCIAL

## 2024-03-19 DIAGNOSIS — R10.31 RIGHT LOWER QUADRANT PAIN: ICD-10-CM

## 2024-03-19 PROCEDURE — 74177 CT ABD & PELVIS W/CONTRAST: CPT | Mod: 26,,, | Performed by: RADIOLOGY

## 2024-03-19 PROCEDURE — 25500020 PHARM REV CODE 255

## 2024-03-19 PROCEDURE — 74177 CT ABD & PELVIS W/CONTRAST: CPT | Mod: TC

## 2024-03-19 RX ADMIN — IOPAMIDOL 100 ML: 755 INJECTION, SOLUTION INTRAVENOUS at 09:03

## 2024-03-20 ENCOUNTER — TELEPHONE (OUTPATIENT)
Dept: GASTROENTEROLOGY | Facility: CLINIC | Age: 29
End: 2024-03-20
Payer: COMMERCIAL

## 2024-03-20 NOTE — TELEPHONE ENCOUNTER
----- Message from JOANA Mathew sent at 3/19/2024  4:14 PM CDT -----  CT is unremarkable. Would recommend EGD for further evaluation of pain.

## 2024-03-25 DIAGNOSIS — G47.00 INSOMNIA, UNSPECIFIED TYPE: ICD-10-CM

## 2024-03-25 DIAGNOSIS — R10.31 RIGHT LOWER QUADRANT ABDOMINAL PAIN: Primary | ICD-10-CM

## 2024-03-25 RX ORDER — TRAZODONE HYDROCHLORIDE 50 MG/1
TABLET ORAL
Qty: 30 TABLET | Refills: 2 | Status: SHIPPED | OUTPATIENT
Start: 2024-03-25 | End: 2024-05-08

## 2024-03-27 ENCOUNTER — HOSPITAL ENCOUNTER (OUTPATIENT)
Dept: GASTROENTEROLOGY | Facility: HOSPITAL | Age: 29
Discharge: HOME | End: 2024-03-27
Admitting: INTERNAL MEDICINE
Payer: COMMERCIAL

## 2024-03-27 ENCOUNTER — ANESTHESIA EVENT (OUTPATIENT)
Dept: GASTROENTEROLOGY | Facility: HOSPITAL | Age: 29
End: 2024-03-27
Payer: COMMERCIAL

## 2024-03-27 ENCOUNTER — ANESTHESIA (OUTPATIENT)
Dept: GASTROENTEROLOGY | Facility: HOSPITAL | Age: 29
End: 2024-03-27
Payer: COMMERCIAL

## 2024-03-27 VITALS
SYSTOLIC BLOOD PRESSURE: 111 MMHG | HEART RATE: 70 BPM | OXYGEN SATURATION: 99 % | DIASTOLIC BLOOD PRESSURE: 57 MMHG | RESPIRATION RATE: 19 BRPM | TEMPERATURE: 97 F

## 2024-03-27 VITALS
RESPIRATION RATE: 17 BRPM | OXYGEN SATURATION: 92 % | DIASTOLIC BLOOD PRESSURE: 61 MMHG | SYSTOLIC BLOOD PRESSURE: 108 MMHG | TEMPERATURE: 97 F | HEART RATE: 89 BPM

## 2024-03-27 DIAGNOSIS — R10.31 RIGHT LOWER QUADRANT ABDOMINAL PAIN: ICD-10-CM

## 2024-03-27 PROCEDURE — 27000284 HC CANNULA NASAL: Performed by: NURSE ANESTHETIST, CERTIFIED REGISTERED

## 2024-03-27 PROCEDURE — D9220A PRA ANESTHESIA: Mod: ,,, | Performed by: NURSE ANESTHETIST, CERTIFIED REGISTERED

## 2024-03-27 PROCEDURE — 43239 EGD BIOPSY SINGLE/MULTIPLE: CPT | Performed by: INTERNAL MEDICINE

## 2024-03-27 PROCEDURE — 88342 IMHCHEM/IMCYTCHM 1ST ANTB: CPT | Mod: 26,,, | Performed by: PATHOLOGY

## 2024-03-27 PROCEDURE — 37000008 HC ANESTHESIA 1ST 15 MINUTES

## 2024-03-27 PROCEDURE — 25000003 PHARM REV CODE 250: Performed by: NURSE ANESTHETIST, CERTIFIED REGISTERED

## 2024-03-27 PROCEDURE — 88305 TISSUE EXAM BY PATHOLOGIST: CPT | Mod: 26,,, | Performed by: PATHOLOGY

## 2024-03-27 PROCEDURE — 27201423 OPTIME MED/SURG SUP & DEVICES STERILE SUPPLY

## 2024-03-27 PROCEDURE — 63600175 PHARM REV CODE 636 W HCPCS: Performed by: NURSE ANESTHETIST, CERTIFIED REGISTERED

## 2024-03-27 PROCEDURE — 88305 TISSUE EXAM BY PATHOLOGIST: CPT | Mod: TC,SUR | Performed by: INTERNAL MEDICINE

## 2024-03-27 PROCEDURE — 43239 EGD BIOPSY SINGLE/MULTIPLE: CPT | Mod: ,,, | Performed by: INTERNAL MEDICINE

## 2024-03-27 PROCEDURE — 27000716 HC OXISENSOR PROBE, ANY SIZE: Performed by: NURSE ANESTHETIST, CERTIFIED REGISTERED

## 2024-03-27 RX ORDER — PROPOFOL 10 MG/ML
VIAL (ML) INTRAVENOUS CONTINUOUS PRN
Status: DISCONTINUED | OUTPATIENT
Start: 2024-03-27 | End: 2024-03-27

## 2024-03-27 RX ORDER — LIDOCAINE HYDROCHLORIDE 20 MG/ML
INJECTION, SOLUTION EPIDURAL; INFILTRATION; INTRACAUDAL; PERINEURAL
Status: DISCONTINUED | OUTPATIENT
Start: 2024-03-27 | End: 2024-03-27

## 2024-03-27 RX ORDER — SODIUM CHLORIDE 0.9 % (FLUSH) 0.9 %
10 SYRINGE (ML) INJECTION
Status: DISCONTINUED | OUTPATIENT
Start: 2024-03-27 | End: 2024-03-28 | Stop reason: HOSPADM

## 2024-03-27 RX ORDER — PROPOFOL 10 MG/ML
VIAL (ML) INTRAVENOUS
Status: DISCONTINUED | OUTPATIENT
Start: 2024-03-27 | End: 2024-03-27

## 2024-03-27 RX ADMIN — LIDOCAINE HYDROCHLORIDE 70 MG: 20 INJECTION, SOLUTION INTRAVENOUS at 08:03

## 2024-03-27 RX ADMIN — PROPOFOL 110 MG: 10 INJECTION, EMULSION INTRAVENOUS at 08:03

## 2024-03-27 RX ADMIN — PROPOFOL 100 MG: 10 INJECTION, EMULSION INTRAVENOUS at 08:03

## 2024-03-27 RX ADMIN — SODIUM CHLORIDE: 9 INJECTION, SOLUTION INTRAVENOUS at 08:03

## 2024-03-27 NOTE — TRANSFER OF CARE
Anesthesia Transfer of Care Note    Patient: Yeny Beaver    Procedure(s) Performed: * No procedures listed *    Patient location: GI    Anesthesia Type: general    Transport from OR: Transported from OR on room air with adequate spontaneous ventilation. Continuous ECG monitoring in transport. Continuous SpO2 monitoring in transport    Post pain: adequate analgesia    Post assessment: no apparent anesthetic complications    Post vital signs: stable    Level of consciousness: sedated and responds to stimulation    Nausea/Vomiting: no nausea/vomiting    Complications: none    Transfer of care protocol was followedComments: Good SV continue, NAD, VSS, RTRN      Last vitals: Visit Vitals  /67 (BP Location: Right arm, Patient Position: Lying)   Pulse 88   Temp 36.1 °C (97 °F) (Skin)   Resp 16   LMP  (LMP Unknown)   SpO2 98%   Breastfeeding No

## 2024-03-27 NOTE — DISCHARGE INSTRUCTIONS
Procedure Date  3/27/24     Impression  Overall Impression:   Mild abnormal mucosa, consistent with gastritis in the antrum; performed cold forceps biopsies  The upper third of the esophagus, middle third of the esophagus, lower third of the esophagus, Z-line, cardia, fundus of the stomach, body of the stomach, incisura, duodenal bulb, 1st part of the duodenum and 2nd part of the duodenum appeared normal.        Recommendation    Await pathology results     Continue current medications  Start a daily fiber supplement with Citrucel or Fibercon (can purchase at your local pharmacy)  Use Miralax 17 grams daily-to-twice daily as needed to have a regular, soft BM without straining   Drink at least 60-80 ounces of water daily unless you have a medical condition that requires fluid restriction  If pain persists at follow up, consider colonoscopy   Keep follow up in GI clinic as scheduled or sooner PRN      Outcome of procedure: successful EGD  Disposition: patient to recovery following procedure; discharge to home when appropriate parameters met  Provisions for follow up: please call my office for any unexpected symptoms like chest or abdominal pain or bleeding following your procedure.  Final Diagnosis: abdominal pain      Indication  27 yo AAF with RUQ abdominal pain worst postprandially. H/o cholecystectomy. Cross sectional imaging unremarkable.

## 2024-03-27 NOTE — H&P
Gastroenterology Pre-procedure H&P    History of Present Illness    Yeny Beaver is a 28 y.o. female that  has a past medical history of Cardiac murmur, Endometriosis, Migraine headache (11/13/2019), PCOS (polycystic ovarian syndrome), Periumbilical hernia (08/20/2020), PONV (postoperative nausea and vomiting), and Retroflexion of uterus (07/31/2018).     Patient with RUQ abdominal pain here for EGD. Has had cholecystectomy. CT unremarkable.       Past Medical History:   Diagnosis Date    Cardiac murmur     Endometriosis     Migraine headache 11/13/2019    PCOS (polycystic ovarian syndrome)     Periumbilical hernia 08/20/2020    repaired by Dr. Dwyer on 10/07/2020    PONV (postoperative nausea and vomiting)     Retroflexion of uterus 07/31/2018    3rd degree       Past Surgical History:   Procedure Laterality Date    CHOLECYSTECTOMY  3-2022    Cyst removed from cervix      Cyst removed from ovary      DILATION AND CURETTAGE OF UTERUS      HERNIA REPAIR      HYSTERECTOMY      LAPAROSCOPIC CHOLECYSTECTOMY N/A 03/09/2022    Procedure: CHOLECYSTECTOMY, LAPAROSCOPIC;  Surgeon: Denny Dwyer MD;  Location: [facility];  Service: General;  Laterality: N/A;    LOOP ELECTROSURGICAL EXCISION PROCEDURE (LEEP)      Operative Laparoscopy with lysis of adhesions      RH/BSO with limited cystoscopy  09/24/2018    TONSILLECTOMY  2018       Family History   Problem Relation Age of Onset    Cancer Mother     No Known Problems Father     Asthma Brother     Breast cancer Other     Diabetes Other        Social History     Socioeconomic History    Marital status:    Tobacco Use    Smoking status: Never     Passive exposure: Current    Smokeless tobacco: Never   Substance and Sexual Activity    Alcohol use: Yes     Alcohol/week: 4.0 standard drinks of alcohol     Types: 2 Glasses of wine, 2 Shots of liquor per week     Comment: occ    Drug use: Never    Sexual activity: Yes     Partners: Male     Birth control/protection:  See Surgical Hx   Social History Narrative    Lives at home with .       Current Outpatient Medications   Medication Sig Dispense Refill    albuterol (PROVENTIL) 2.5 mg /3 mL (0.083 %) nebulizer solution Take 3 mLs (2.5 mg total) by nebulization every 4 to 6 hours as needed for Wheezing. 1 each 1    albuterol (PROVENTIL/VENTOLIN HFA) 90 mcg/actuation inhaler Inhale 1-2 puffs into the lungs every 6 (six) hours as needed for Wheezing. Rescue 18 g 1    busPIRone (BUSPAR) 15 MG tablet Take 1 tablet (15 mg total) by mouth 3 (three) times daily. 90 tablet 11    butalbital-acetaminophen-caff -40 mg Cap Take 1 capsule by mouth every 4 (four) hours as needed (migraine).      cyanocobalamin 1,000 mcg/mL injection cyanocobalamin (vit B-12) 1,000 mcg/mL injection solution  INJECT 1 ML INTRAMUSCULARLY MONTHLY (Patient not taking: Reported on 2/28/2024) 1 mL 5    desvenlafaxine succinate (PRISTIQ) 100 MG Tb24 Take 1 tablet (100 mg total) by mouth once daily. 30 tablet 11    dicyclomine (BENTYL) 10 MG capsule TAKE ONE CAPSULE BY MOUTH FOUR TIMES DAILY BEFORE MEALS & NIGHTLY 120 capsule 2    EPINEPHrine (EPIPEN) 0.3 mg/0.3 mL AtIn epinephrine 0.3 mg/0.3 mL injection, auto-injector  USE AS DIRECTED FOR ACUTE ALLERGIC REACTION Strength: 0.3 mg/0.3 mL 1 each 1    ergocalciferol (ERGOCALCIFEROL) 50,000 unit Cap Take 1 capsule (50,000 Units total) by mouth every 7 days. 4 capsule 2    estradioL (ESTRACE) 2 MG tablet Take 2 mg by mouth 2 (two) times daily.      fluticasone-salmeterol diskus inhaler 250-50 mcg Inhale 1 puff into the lungs daily as needed (wheezing). Controller 1 each 3    ketorolac (TORADOL) 10 mg tablet Take 1 tablet (10 mg total) by mouth every 6 (six) hours as needed for Pain. (Patient not taking: Reported on 2/28/2024) 30 tablet 0    montelukast (SINGULAIR) 10 mg tablet montelukast 10 mg tablet  TAKE 1 TABLET BY MOUTH DAILY AT BEDTIME 30 tablet 11    nabumetone (RELAFEN) 500 MG tablet Take 500 mg by mouth  2 (two) times daily as needed.      omeprazole (PRILOSEC) 40 MG capsule Take 40 mg by mouth every morning.      ondansetron (ZOFRAN-ODT) 8 MG TbDL Take 1 tablet (8 mg total) by mouth every 6 (six) hours as needed (nausea). 30 tablet 1    predniSONE (DELTASONE) 10 MG tablet Take 3 tabs daily x 2 days, then 2 tabs daily x 2 days, then 1 tab daily x 2 days, then half tab daily x 2 days. (Patient not taking: Reported on 2/8/2024) 13 tablet 0    promethazine (PHENERGAN) 12.5 MG Tab Take 12.5 mg by mouth 4 (four) times daily as needed.      scopolamine (TRANSDERM-SCOP) 1.3-1.5 mg (1 mg over 3 days) Place 1 patch onto the skin Every 3 (three) days.      tiZANidine (ZANAFLEX) 4 MG tablet TAKE 1 OR 2 TABLETS BY MOUTH AT BEDTIME AS NEEDED FOR NECK PAIN / SPASM      traZODone (DESYREL) 50 MG tablet TAKE 1 TABLET BY MOUTH IN THE EVENING DAILY 30 tablet 2    triamcinolone acetonide 0.1% (KENALOG) 0.1 % ointment Apply to affected area 2 TIMES A DAY, NO LONGER THEN 1 WEEK 454 g 0     No current facility-administered medications for this encounter.       Review of patient's allergies indicates:   Allergen Reactions    Adhesive      Adhesive on hormone patch    Allergen ext-venom-honey bee Other (See Comments)    Insect venom      Bee sting       Objective:  Vitals:    03/27/24 0721   BP: 126/79   Pulse: 80   Resp: 17   SpO2: 98%        GEN: normal appearing, NAD, AAO x3  HENT: NCAT, anicteric, OP benign  CV: normal rate, regular rhythm  RESP: CTA, symmetric rise, unlabored  ABD: soft, ND, no guarding or TTP  SKIN: warm and dry  NEURO: grossly afocal    Assessment and Plan:    Proceed with:    EGD for abdominal pain      Williams Morrissey MD  Gastroenterology

## 2024-03-27 NOTE — ANESTHESIA PREPROCEDURE EVALUATION
03/27/2024  Yeny Beaver is a 28 y.o., female.    Past Medical History:   Diagnosis Date    Cardiac murmur     Endometriosis     Migraine headache 11/13/2019    PCOS (polycystic ovarian syndrome)     Periumbilical hernia 08/20/2020    repaired by Dr. Dwyer on 10/07/2020    PONV (postoperative nausea and vomiting)     Retroflexion of uterus 07/31/2018    3rd degree       Past Surgical History:   Procedure Laterality Date    CHOLECYSTECTOMY  3-2022    Cyst removed from cervix      Cyst removed from ovary      DILATION AND CURETTAGE OF UTERUS      HERNIA REPAIR      HYSTERECTOMY      LAPAROSCOPIC CHOLECYSTECTOMY N/A 03/09/2022    Procedure: CHOLECYSTECTOMY, LAPAROSCOPIC;  Surgeon: Denny Dwyer MD;  Location: [facility];  Service: General;  Laterality: N/A;    LOOP ELECTROSURGICAL EXCISION PROCEDURE (LEEP)      Operative Laparoscopy with lysis of adhesions      RH/BSO with limited cystoscopy  09/24/2018    TONSILLECTOMY  2018       Family History   Problem Relation Age of Onset    Cancer Mother     No Known Problems Father     Asthma Brother     Breast cancer Other     Diabetes Other        Social History     Socioeconomic History    Marital status:    Tobacco Use    Smoking status: Never     Passive exposure: Current    Smokeless tobacco: Never   Substance and Sexual Activity    Alcohol use: Yes     Alcohol/week: 4.0 standard drinks of alcohol     Types: 2 Glasses of wine, 2 Shots of liquor per week     Comment: occ    Drug use: Never    Sexual activity: Yes     Partners: Male     Birth control/protection: See Surgical Hx   Social History Narrative    Lives at home with .       Current Outpatient Medications   Medication Sig Dispense Refill    albuterol (PROVENTIL) 2.5 mg /3 mL (0.083 %) nebulizer solution Take 3 mLs (2.5 mg total) by nebulization every 4 to 6 hours as needed for Wheezing.  1 each 1    albuterol (PROVENTIL/VENTOLIN HFA) 90 mcg/actuation inhaler Inhale 1-2 puffs into the lungs every 6 (six) hours as needed for Wheezing. Rescue 18 g 1    busPIRone (BUSPAR) 15 MG tablet Take 1 tablet (15 mg total) by mouth 3 (three) times daily. 90 tablet 11    butalbital-acetaminophen-caff -40 mg Cap Take 1 capsule by mouth every 4 (four) hours as needed (migraine).      cyanocobalamin 1,000 mcg/mL injection cyanocobalamin (vit B-12) 1,000 mcg/mL injection solution  INJECT 1 ML INTRAMUSCULARLY MONTHLY (Patient not taking: Reported on 2/28/2024) 1 mL 5    desvenlafaxine succinate (PRISTIQ) 100 MG Tb24 Take 1 tablet (100 mg total) by mouth once daily. 30 tablet 11    dicyclomine (BENTYL) 10 MG capsule TAKE ONE CAPSULE BY MOUTH FOUR TIMES DAILY BEFORE MEALS & NIGHTLY 120 capsule 2    EPINEPHrine (EPIPEN) 0.3 mg/0.3 mL AtIn epinephrine 0.3 mg/0.3 mL injection, auto-injector  USE AS DIRECTED FOR ACUTE ALLERGIC REACTION Strength: 0.3 mg/0.3 mL 1 each 1    ergocalciferol (ERGOCALCIFEROL) 50,000 unit Cap Take 1 capsule (50,000 Units total) by mouth every 7 days. 4 capsule 2    estradioL (ESTRACE) 2 MG tablet Take 2 mg by mouth 2 (two) times daily.      fluticasone-salmeterol diskus inhaler 250-50 mcg Inhale 1 puff into the lungs daily as needed (wheezing). Controller 1 each 3    ketorolac (TORADOL) 10 mg tablet Take 1 tablet (10 mg total) by mouth every 6 (six) hours as needed for Pain. (Patient not taking: Reported on 2/28/2024) 30 tablet 0    montelukast (SINGULAIR) 10 mg tablet montelukast 10 mg tablet  TAKE 1 TABLET BY MOUTH DAILY AT BEDTIME 30 tablet 11    nabumetone (RELAFEN) 500 MG tablet Take 500 mg by mouth 2 (two) times daily as needed.      omeprazole (PRILOSEC) 40 MG capsule Take 40 mg by mouth every morning.      ondansetron (ZOFRAN-ODT) 8 MG TbDL Take 1 tablet (8 mg total) by mouth every 6 (six) hours as needed (nausea). 30 tablet 1    predniSONE (DELTASONE) 10 MG tablet Take 3 tabs daily x 2  days, then 2 tabs daily x 2 days, then 1 tab daily x 2 days, then half tab daily x 2 days. (Patient not taking: Reported on 2/8/2024) 13 tablet 0    promethazine (PHENERGAN) 12.5 MG Tab Take 12.5 mg by mouth 4 (four) times daily as needed.      scopolamine (TRANSDERM-SCOP) 1.3-1.5 mg (1 mg over 3 days) Place 1 patch onto the skin Every 3 (three) days.      tiZANidine (ZANAFLEX) 4 MG tablet TAKE 1 OR 2 TABLETS BY MOUTH AT BEDTIME AS NEEDED FOR NECK PAIN / SPASM      traZODone (DESYREL) 50 MG tablet TAKE 1 TABLET BY MOUTH IN THE EVENING DAILY 30 tablet 2    triamcinolone acetonide 0.1% (KENALOG) 0.1 % ointment Apply to affected area 2 TIMES A DAY, NO LONGER THEN 1 WEEK 454 g 0     No current facility-administered medications for this encounter.       Review of patient's allergies indicates:   Allergen Reactions    Adhesive      Adhesive on hormone patch    Allergen ext-venom-honey bee Other (See Comments)    Insect venom      Bee sting      Pre-op Assessment    I have reviewed the Patient Summary Reports.     I have reviewed the Nursing Notes. I have reviewed the NPO Status.   I have reviewed the Medications.     Review of Systems  Anesthesia Hx:  No problems with previous Anesthesia             Denies Family Hx of Anesthesia complications.    Denies Personal Hx of Anesthesia complications.                    Hematology/Oncology:    Oncology Normal                                   EENT/Dental:  EENT/Dental Normal           Cardiovascular:                hyperlipidemia                             Pulmonary:    Asthma                    Renal/:  Renal/ Normal                 Hepatic/GI:     GERD             Musculoskeletal:  Musculoskeletal Normal                Neurological:    Neuromuscular Disease,  Headaches                                 Endocrine:        Obesity / BMI > 30  Dermatological:  Skin Normal    Psych:  Psychiatric History                  Physical Exam  General: Well nourished, Alert, Oriented and  Cooperative    Airway:  Mouth Opening: Normal  Neck ROM: Normal ROM    Chest/Lungs:  Normal Respiratory Rate    Heart:  Rate: Normal        Anesthesia Plan  Type of Anesthesia, risks & benefits discussed:    Anesthesia Type: Gen Natural Airway, MAC  Intra-op Monitoring Plan: Standard ASA Monitors  Post Op Pain Control Plan: multimodal analgesia and IV/PO Opioids PRN  Induction:  IV  Informed Consent: Informed consent signed with the Patient and all parties understand the risks and agree with anesthesia plan.  All questions answered. Patient consented to blood products? Yes  ASA Score: 3  Day of Surgery Review of History & Physical: I have interviewed and examined the patient. I have reviewed the patient's H&P dated: There are no significant changes.     Ready For Surgery From Anesthesia Perspective.     .

## 2024-03-27 NOTE — ANESTHESIA POSTPROCEDURE EVALUATION
Anesthesia Post Evaluation    Patient: Yeny Beaver    Procedure(s) Performed: * No procedures listed *    Final Anesthesia Type: general      Patient location during evaluation: GI PACU  Patient participation: Yes- Able to Participate  Level of consciousness: awake and alert  Post-procedure vital signs: reviewed and stable  Pain management: adequate  Airway patency: patent    PONV status at discharge: No PONV  Anesthetic complications: no      Cardiovascular status: blood pressure returned to baseline and hemodynamically stable  Respiratory status: spontaneous ventilation  Hydration status: euvolemic  Follow-up not needed.  Comments: Refer to nursing notes for pain/antoinette score upon discharge from recovery.              Vitals Value Taken Time   /63 03/27/24 0904   Temp 36.1 °C (97 °F) 03/27/24 0836   Pulse 62 03/27/24 0904   Resp 18 03/27/24 0903   SpO2 99 % 03/27/24 0903   Vitals shown include unvalidated device data.      Event Time   Out of Recovery 09:15:54         Pain/Antoinette Score: Antoinette Score: 10 (3/27/2024  9:00 AM)

## 2024-03-28 LAB
DHEA SERPL-MCNC: NORMAL
ESTROGEN SERPL-MCNC: NORMAL PG/ML
INSULIN SERPL-ACNC: NORMAL U[IU]/ML
LAB AP GROSS DESCRIPTION: NORMAL
LAB AP LABORATORY NOTES: NORMAL
T3RU NFR SERPL: NORMAL %

## 2024-04-02 DIAGNOSIS — Z91.030 HISTORY OF BEE STING ALLERGY: Primary | ICD-10-CM

## 2024-04-02 RX ORDER — EPINEPHRINE 0.3 MG/.3ML
INJECTION SUBCUTANEOUS
Qty: 2 EACH | Refills: 2 | Status: SHIPPED | OUTPATIENT
Start: 2024-04-02 | End: 2024-05-08

## 2024-04-02 NOTE — TELEPHONE ENCOUNTER
Patient notified of denial for Epipen through her health insurance. Insurance prefers a different product. Will send in prescription for preferred formulary to [facility] in [place].

## 2024-05-08 ENCOUNTER — OFFICE VISIT (OUTPATIENT)
Dept: FAMILY MEDICINE | Facility: CLINIC | Age: 29
End: 2024-05-08
Payer: COMMERCIAL

## 2024-05-08 VITALS
BODY MASS INDEX: 39.58 KG/M2 | SYSTOLIC BLOOD PRESSURE: 112 MMHG | RESPIRATION RATE: 19 BRPM | OXYGEN SATURATION: 98 % | HEART RATE: 82 BPM | DIASTOLIC BLOOD PRESSURE: 65 MMHG | WEIGHT: 261.19 LBS | HEIGHT: 68 IN | TEMPERATURE: 98 F

## 2024-05-08 DIAGNOSIS — F33.0 MILD EPISODE OF RECURRENT MAJOR DEPRESSIVE DISORDER: ICD-10-CM

## 2024-05-08 DIAGNOSIS — M54.9 BACK PAIN, UNSPECIFIED BACK LOCATION, UNSPECIFIED BACK PAIN LATERALITY, UNSPECIFIED CHRONICITY: ICD-10-CM

## 2024-05-08 DIAGNOSIS — R10.9 FLANK PAIN: ICD-10-CM

## 2024-05-08 DIAGNOSIS — R31.9 HEMATURIA, UNSPECIFIED TYPE: Primary | ICD-10-CM

## 2024-05-08 DIAGNOSIS — Z79.890 HORMONE REPLACEMENT THERAPY: ICD-10-CM

## 2024-05-08 LAB
BILIRUB SERPL-MCNC: NORMAL MG/DL
BLOOD URINE, POC: NORMAL
COLOR, POC UA: YELLOW
GLUCOSE UR QL STRIP: NORMAL
KETONES UR QL STRIP: NORMAL
LEUKOCYTE ESTERASE URINE, POC: NORMAL
NITRITE, POC UA: NORMAL
PH, POC UA: 6
PROTEIN, POC: NORMAL
SPECIFIC GRAVITY, POC UA: 1.01
UROBILINOGEN, POC UA: 0.2

## 2024-05-08 PROCEDURE — 81003 URINALYSIS AUTO W/O SCOPE: CPT | Mod: QW,,, | Performed by: NURSE PRACTITIONER

## 2024-05-08 PROCEDURE — 3008F BODY MASS INDEX DOCD: CPT | Mod: ,,, | Performed by: NURSE PRACTITIONER

## 2024-05-08 PROCEDURE — 99213 OFFICE O/P EST LOW 20 MIN: CPT | Mod: ,,, | Performed by: NURSE PRACTITIONER

## 2024-05-08 PROCEDURE — 3074F SYST BP LT 130 MM HG: CPT | Mod: ,,, | Performed by: NURSE PRACTITIONER

## 2024-05-08 PROCEDURE — 1160F RVW MEDS BY RX/DR IN RCRD: CPT | Mod: ,,, | Performed by: NURSE PRACTITIONER

## 2024-05-08 PROCEDURE — 87086 URINE CULTURE/COLONY COUNT: CPT | Mod: ,,, | Performed by: CLINICAL MEDICAL LABORATORY

## 2024-05-08 PROCEDURE — 1159F MED LIST DOCD IN RCRD: CPT | Mod: ,,, | Performed by: NURSE PRACTITIONER

## 2024-05-08 PROCEDURE — 3078F DIAST BP <80 MM HG: CPT | Mod: ,,, | Performed by: NURSE PRACTITIONER

## 2024-05-08 RX ORDER — NITROFURANTOIN 25; 75 MG/1; MG/1
100 CAPSULE ORAL 2 TIMES DAILY
Qty: 10 CAPSULE | Refills: 0 | Status: SHIPPED | OUTPATIENT
Start: 2024-05-08 | End: 2024-06-17 | Stop reason: ALTCHOICE

## 2024-05-08 RX ORDER — QUETIAPINE FUMARATE 25 MG/1
TABLET, FILM COATED ORAL
COMMUNITY
End: 2024-05-08

## 2024-05-08 RX ORDER — ESTRADIOL 2 MG/1
2 TABLET ORAL 2 TIMES DAILY
Qty: 60 TABLET | Refills: 2 | Status: SHIPPED | OUTPATIENT
Start: 2024-05-08 | End: 2024-05-08

## 2024-05-08 RX ORDER — NITROFURANTOIN 25; 75 MG/1; MG/1
100 CAPSULE ORAL 2 TIMES DAILY
Qty: 10 CAPSULE | Refills: 0 | Status: SHIPPED | OUTPATIENT
Start: 2024-05-08 | End: 2024-05-08

## 2024-05-08 RX ORDER — KETOROLAC TROMETHAMINE 30 MG/ML
60 INJECTION, SOLUTION INTRAMUSCULAR; INTRAVENOUS
Status: SHIPPED | OUTPATIENT
Start: 2024-05-08

## 2024-05-08 RX ORDER — ESTRADIOL 2 MG/1
2 TABLET ORAL 2 TIMES DAILY
Qty: 60 TABLET | Refills: 2 | Status: SHIPPED | OUTPATIENT
Start: 2024-05-08

## 2024-05-08 RX ORDER — AMITRIPTYLINE HYDROCHLORIDE 10 MG/1
1 TABLET, FILM COATED ORAL NIGHTLY
COMMUNITY
End: 2024-05-08

## 2024-05-08 NOTE — PROGRESS NOTES
Valeria Melendrez NP   [facility]  [address]  [place]  [address]      PATIENT NAME: Yeny Beaver  : 1995  DATE: 24  MRN: [id number]      Billing Provider: Valeria Melendrez NP  Level of Service: MI OFFICE/OUTPT VISIT, EST, LEVL III, 20-29 MIN  Patient PCP Information       Provider PCP Type    Valeria Melednrez NP General            Reason for Visit / Chief Complaint: Back Pain (Yeny Beaver 28 year old black female presents with low back pain x1 week. Reports she thought she slept wrong or possibly had not had enough fluid intake. She increased water intake and thought if she slept wrong that it would be better by now. No lifting or moving of any heavy objects reported. Patient is not on her cycle.)         History of Present Illness / Problem Focused Workflow     28 year old black female presents with low back pain x1 week. Reports she thought she slept wrong or possibly had not had enough fluid intake. She increased water intake and thought if she slept wrong that it would be better by now. No lifting or moving of any heavy objects reported. Patient is not on her cycle. She denies dysuria but does report some increased frequency and urgency.             Review of Systems     @Review of Systems   Constitutional:  Negative for activity change, appetite change, fatigue and fever.   HENT:  Negative for nasal congestion, ear pain, rhinorrhea, sinus pressure/congestion and sore throat.    Eyes:  Negative for pain, redness, visual disturbance and eye dryness.   Respiratory:  Negative for cough and shortness of breath.    Cardiovascular:  Negative for chest pain and leg swelling.   Gastrointestinal:  Negative for abdominal distention, abdominal pain, constipation and diarrhea.   Endocrine: Negative for cold intolerance, heat intolerance and polyuria.   Genitourinary:  Positive for frequency and urgency. Negative for bladder incontinence and dysuria.   Musculoskeletal:   Positive for back pain. Negative for arthralgias, gait problem and myalgias.   Integumentary:  Negative for color change, rash and wound.   Allergic/Immunologic: Negative for environmental allergies and food allergies.   Neurological:  Negative for dizziness, weakness, light-headedness and headaches.   Psychiatric/Behavioral:  Negative for behavioral problems and sleep disturbance.        Medical / Social / Family History     Past Medical History:   Diagnosis Date    Cardiac murmur     Endometriosis     History of bee sting allergy     Migraine headache 11/13/2019    PCOS (polycystic ovarian syndrome)     Periumbilical hernia 08/20/2020    repaired by Dr. Dwyer on 10/07/2020    PONV (postoperative nausea and vomiting)     Retroflexion of uterus 07/31/2018    3rd degree       Past Surgical History:   Procedure Laterality Date    CHOLECYSTECTOMY  3-2022    Cyst removed from cervix      Cyst removed from ovary      DILATION AND CURETTAGE OF UTERUS      HERNIA REPAIR      HYSTERECTOMY      LAPAROSCOPIC CHOLECYSTECTOMY N/A 03/09/2022    Procedure: CHOLECYSTECTOMY, LAPAROSCOPIC;  Surgeon: Denny Dwyer MD;  Location: [facility];  Service: General;  Laterality: N/A;    LOOP ELECTROSURGICAL EXCISION PROCEDURE (LEEP)      Operative Laparoscopy with lysis of adhesions      RH/BSO with limited cystoscopy  09/24/2018    TONSILLECTOMY  2018       Medications and Allergies     Medications  Outpatient Medications Marked as Taking for the 5/8/24 encounter (Office Visit) with Valeria Melendrez NP   Medication Sig Dispense Refill    albuterol (PROVENTIL) 2.5 mg /3 mL (0.083 %) nebulizer solution Take 3 mLs (2.5 mg total) by nebulization every 4 to 6 hours as needed for Wheezing. 1 each 1    albuterol (PROVENTIL/VENTOLIN HFA) 90 mcg/actuation inhaler Inhale 1-2 puffs into the lungs every 6 (six) hours as needed for Wheezing. Rescue 18 g 1    butalbital-acetaminophen-caff -40 mg Cap Take 1 capsule by mouth every 4 (four) hours  as needed (migraine).      cyanocobalamin 1,000 mcg/mL injection cyanocobalamin (vit B-12) 1,000 mcg/mL injection solution  INJECT 1 ML INTRAMUSCULARLY MONTHLY 1 mL 5    fluticasone-salmeterol diskus inhaler 250-50 mcg Inhale 1 puff into the lungs daily as needed (wheezing). Controller 1 each 3    nabumetone (RELAFEN) 500 MG tablet Take 500 mg by mouth 2 (two) times daily as needed.      omeprazole (PRILOSEC) 40 MG capsule Take 40 mg by mouth every morning.      ondansetron (ZOFRAN-ODT) 8 MG TbDL Take 1 tablet (8 mg total) by mouth every 6 (six) hours as needed (nausea). 30 tablet 1    promethazine (PHENERGAN) 12.5 MG Tab Take 12.5 mg by mouth 4 (four) times daily as needed.      tiZANidine (ZANAFLEX) 4 MG tablet TAKE 1 OR 2 TABLETS BY MOUTH AT BEDTIME AS NEEDED FOR NECK PAIN / SPASM      triamcinolone acetonide 0.1% (KENALOG) 0.1 % ointment Apply to affected area 2 TIMES A DAY, NO LONGER THEN 1 WEEK 454 g 0    [DISCONTINUED] estradioL (ESTRACE) 2 MG tablet Take 2 mg by mouth 2 (two) times daily.      [DISCONTINUED] ketorolac (TORADOL) 10 mg tablet Take 1 tablet (10 mg total) by mouth every 6 (six) hours as needed for Pain. 30 tablet 0     Current Facility-Administered Medications for the 5/8/24 encounter (Office Visit) with Valeria Melendrez NP   Medication Dose Route Frequency Provider Last Rate Last Admin    ketorolac injection 60 mg  60 mg Intramuscular 1 time in Clinic/HOD Valeria Melendrez NP           Allergies  Review of patient's allergies indicates:   Allergen Reactions    Adhesive      Adhesive on hormone patch    Allergen ext-venom-honey bee Other (See Comments)    Insect venom      Bee sting       Physical Examination     Vitals:    05/08/24 1617   BP: 112/65   Pulse: 82   Resp: 19   Temp: 98.3 °F (36.8 °C)     Physical Exam  Vitals and nursing note reviewed.   Constitutional:       Appearance: She is obese.   HENT:      Head: Normocephalic.      Right Ear: Tympanic membrane normal.      Left Ear:  Tympanic membrane normal.      Nose: Nose normal.      Mouth/Throat:      Mouth: Mucous membranes are moist.      Pharynx: Oropharynx is clear. No posterior oropharyngeal erythema.   Eyes:      Conjunctiva/sclera: Conjunctivae normal.   Cardiovascular:      Rate and Rhythm: Normal rate and regular rhythm.      Pulses: Normal pulses.      Heart sounds: Normal heart sounds.   Pulmonary:      Effort: Pulmonary effort is normal.      Breath sounds: Normal breath sounds.   Abdominal:      General: Abdomen is flat. Bowel sounds are normal. There is no distension.      Palpations: Abdomen is soft.      Tenderness: There is abdominal tenderness in the suprapubic area. There is right CVA tenderness and left CVA tenderness.   Musculoskeletal:         General: No swelling. Normal range of motion.      Cervical back: Normal range of motion.      Lumbar back: Tenderness present.      Right lower leg: No edema.      Left lower leg: No edema.   Skin:     General: Skin is warm and dry.      Capillary Refill: Capillary refill takes less than 2 seconds.   Neurological:      Mental Status: She is alert. Mental status is at baseline.   Psychiatric:         Mood and Affect: Mood normal.         Behavior: Behavior normal.               Lab Results   Component Value Date    WBC 8.58 02/28/2024    HGB 12.6 02/28/2024    HCT 37.2 (L) 02/28/2024    MCV 82.7 02/28/2024     (H) 02/28/2024        CMP  Sodium   Date Value Ref Range Status   02/28/2024 139 136 - 145 mmol/L Final     Potassium   Date Value Ref Range Status   02/28/2024 4.5 3.5 - 5.1 mmol/L Final     Chloride   Date Value Ref Range Status   02/28/2024 106 98 - 107 mmol/L Final     CO2   Date Value Ref Range Status   02/28/2024 31 21 - 32 mmol/L Final     Glucose   Date Value Ref Range Status   02/28/2024 96 74 - 106 mg/dL Final     BUN   Date Value Ref Range Status   02/28/2024 9 7 - 18 mg/dL Final     Creatinine   Date Value Ref Range Status   02/28/2024 0.70 0.55 - 1.02  mg/dL Final     Calcium   Date Value Ref Range Status   02/28/2024 9.3 8.5 - 10.1 mg/dL Final     Total Protein   Date Value Ref Range Status   02/28/2024 7.7 6.4 - 8.2 g/dL Final     Albumin   Date Value Ref Range Status   02/28/2024 3.3 (L) 3.5 - 5.0 g/dL Final     Bilirubin, Total   Date Value Ref Range Status   02/28/2024 0.2 >0.0 - 1.2 mg/dL Final     Alk Phos   Date Value Ref Range Status   02/28/2024 61 37 - 98 U/L Final     AST   Date Value Ref Range Status   02/28/2024 19 15 - 37 U/L Final     ALT   Date Value Ref Range Status   02/28/2024 20 13 - 56 U/L Final     Anion Gap   Date Value Ref Range Status   02/28/2024 7 7 - 16 mmol/L Final     eGFR   Date Value Ref Range Status   02/28/2024 123 >=60 mL/min/1.73m2 Final     Procedures   Assessment and Plan (including Health Maintenance)   :    Plan:     Problem List Items Addressed This Visit          Psychiatric    Current mild episode of major depressive disorder       Endocrine    Hormone replacement therapy    Current Assessment & Plan     Patient reports she was placed on Estradiol after Hysterectomy by Dr Parisi and has run out and needs refill. I did discuss with patient that this is high dose and she states they tried lower dosage but this is what works for her. Instructed patient we would refill this time but she will need to follow up with Dr Parisi. She verbalized understanding.          Relevant Medications    estradioL (ESTRACE) 2 MG tablet       GI    Flank pain    Current Assessment & Plan     UA showed trace blood. Will culture urine and treat for cystitis due to symptoms with Macrobid.  Instructed to increase fluids, avoid holding urine, take regular bathroom breaks. Will follow up with culture results. Follow up if symptoms persist or worsen. Pain may be musculoskeletal. May try NSAIDs, heating pad, muscle relaxant. She was given Toradol IM in clinic today.           Other Visit Diagnoses       Hematuria, unspecified type    -  Primary     Relevant Orders    Urine culture    Back pain, unspecified back location, unspecified back pain laterality, unspecified chronicity        Relevant Orders    POCT URINALYSIS W/O SCOPE (Completed)            Health Maintenance Topics with due status: Not Due       Topic Last Completion Date    Influenza Vaccine 10/08/2022       Future Appointments   Date Time Provider Department Center   6/10/2024  8:00 AM Nava Anna FNP [facility]   6/17/2024  1:40 PM Valeria Melendrez NP [facility]        Health Maintenance Due   Topic Date Due    Pneumococcal Vaccines (Age 0-64) (1 of 2 - PCV) Never done    HPV Vaccines (1 - 3-dose series) Never done    TETANUS VACCINE  Never done    COVID-19 Vaccine (1 - 2023-24 season) Never done          Signature:  Valeria Melendrez NP  [address] [place]  [address]    Date of encounter: 5/8/24

## 2024-05-08 NOTE — LETTER
May 8, 2024      [facility]  [address] [place] [address] [place] [address]  Phone: [phone]  Fax: [phone]       Patient: Yeny Beaver   YOB: 1995  Date of Visit: 05/08/2024    To Whom It May Concern:    Kaylin Beaver  was at [facility] on 05/08/2024. The patient may return to work/school on 5/9/24 with no restrictions. If you have any questions or concerns, or if I can be of further assistance, please do not hesitate to contact me.    Sincerely,    Valeria Melendrez NP

## 2024-05-08 NOTE — ASSESSMENT & PLAN NOTE
UA showed trace blood. Will culture urine and treat for cystitis due to symptoms with Macrobid.  Instructed to increase fluids, avoid holding urine, take regular bathroom breaks. Will follow up with culture results. Follow up if symptoms persist or worsen. Pain may be musculoskeletal. May try NSAIDs, heating pad, muscle relaxant. She was given Toradol IM in clinic today.

## 2024-05-10 LAB — UA COMPLETE W REFLEX CULTURE PNL UR: NORMAL

## 2024-05-10 NOTE — PROGRESS NOTES
Labs reviewed: Urine culture showed no growth. She can complete macrobid since she should already have taken a couple days. Follow up if symptoms persist or worsen.

## 2024-06-17 ENCOUNTER — OFFICE VISIT (OUTPATIENT)
Dept: FAMILY MEDICINE | Facility: CLINIC | Age: 29
End: 2024-06-17
Payer: COMMERCIAL

## 2024-06-17 VITALS
HEIGHT: 68 IN | DIASTOLIC BLOOD PRESSURE: 81 MMHG | BODY MASS INDEX: 38.49 KG/M2 | RESPIRATION RATE: 15 BRPM | OXYGEN SATURATION: 98 % | WEIGHT: 254 LBS | SYSTOLIC BLOOD PRESSURE: 131 MMHG | HEART RATE: 90 BPM | TEMPERATURE: 99 F

## 2024-06-17 DIAGNOSIS — Z13.1 SCREENING FOR DIABETES MELLITUS: ICD-10-CM

## 2024-06-17 DIAGNOSIS — Z13.220 SCREENING FOR LIPOID DISORDERS: ICD-10-CM

## 2024-06-17 DIAGNOSIS — M25.561 ACUTE PAIN OF RIGHT KNEE: ICD-10-CM

## 2024-06-17 DIAGNOSIS — Z00.01 ENCOUNTER FOR GENERAL ADULT MEDICAL EXAMINATION WITH ABNORMAL FINDINGS: Primary | ICD-10-CM

## 2024-06-17 DIAGNOSIS — H65.93 MIDDLE EAR EFFUSION, BILATERAL: ICD-10-CM

## 2024-06-17 DIAGNOSIS — G89.29 CHRONIC MIDLINE LOW BACK PAIN WITH RIGHT-SIDED SCIATICA: ICD-10-CM

## 2024-06-17 DIAGNOSIS — M54.41 CHRONIC MIDLINE LOW BACK PAIN WITH RIGHT-SIDED SCIATICA: ICD-10-CM

## 2024-06-17 LAB
CHOLEST SERPL-MCNC: 188 MG/DL (ref 0–200)
CHOLEST/HDLC SERPL: 3.6 {RATIO}
GLUCOSE SERPL-MCNC: 86 MG/DL (ref 74–106)
HDLC SERPL-MCNC: 52 MG/DL (ref 40–60)
LDLC SERPL CALC-MCNC: 115 MG/DL
LDLC/HDLC SERPL: 2.2 {RATIO}
NONHDLC SERPL-MCNC: 136 MG/DL
TRIGL SERPL-MCNC: 106 MG/DL (ref 35–150)
VLDLC SERPL-MCNC: 21 MG/DL

## 2024-06-17 PROCEDURE — 3079F DIAST BP 80-89 MM HG: CPT | Mod: ,,, | Performed by: NURSE PRACTITIONER

## 2024-06-17 PROCEDURE — 96372 THER/PROPH/DIAG INJ SC/IM: CPT | Mod: ,,, | Performed by: NURSE PRACTITIONER

## 2024-06-17 PROCEDURE — 82947 ASSAY GLUCOSE BLOOD QUANT: CPT | Mod: ,,, | Performed by: CLINICAL MEDICAL LABORATORY

## 2024-06-17 PROCEDURE — 3008F BODY MASS INDEX DOCD: CPT | Mod: ,,, | Performed by: NURSE PRACTITIONER

## 2024-06-17 PROCEDURE — 80061 LIPID PANEL: CPT | Mod: ,,, | Performed by: CLINICAL MEDICAL LABORATORY

## 2024-06-17 PROCEDURE — 3075F SYST BP GE 130 - 139MM HG: CPT | Mod: ,,, | Performed by: NURSE PRACTITIONER

## 2024-06-17 PROCEDURE — 1159F MED LIST DOCD IN RCRD: CPT | Mod: ,,, | Performed by: NURSE PRACTITIONER

## 2024-06-17 PROCEDURE — 1160F RVW MEDS BY RX/DR IN RCRD: CPT | Mod: ,,, | Performed by: NURSE PRACTITIONER

## 2024-06-17 PROCEDURE — 99395 PREV VISIT EST AGE 18-39: CPT | Mod: 25,,, | Performed by: NURSE PRACTITIONER

## 2024-06-17 RX ORDER — KETOROLAC TROMETHAMINE 30 MG/ML
60 INJECTION, SOLUTION INTRAMUSCULAR; INTRAVENOUS
Status: COMPLETED | OUTPATIENT
Start: 2024-06-17 | End: 2024-06-17

## 2024-06-17 RX ORDER — METHYLPREDNISOLONE 4 MG/1
TABLET ORAL
Qty: 21 EACH | Refills: 0 | Status: SHIPPED | OUTPATIENT
Start: 2024-06-17 | End: 2024-07-08

## 2024-06-17 RX ORDER — GABAPENTIN 100 MG/1
100 CAPSULE ORAL 3 TIMES DAILY
Qty: 90 CAPSULE | Refills: 1 | Status: SHIPPED | OUTPATIENT
Start: 2024-06-17 | End: 2025-06-17

## 2024-06-17 RX ADMIN — KETOROLAC TROMETHAMINE 60 MG: 30 INJECTION, SOLUTION INTRAMUSCULAR; INTRAVENOUS at 09:06

## 2024-06-17 NOTE — ASSESSMENT & PLAN NOTE
Xray showed no abnormality. We will plan to treat her with Toradol IM today in clinic and she was prescribed Medrol Dose Pack for mid ear effusion which may help with knee pain as well. Instructed to rest/ice knee. If symptoms persist follow up for more diagnostic studies.

## 2024-06-17 NOTE — PROGRESS NOTES
Health Maintenance Discussed:    Topic Date Due    COVID-19 Vaccine (2 - 2023-24 season) 09/01/2023 -  Pt declined COVID booster dose.

## 2024-06-17 NOTE — PROGRESS NOTES
Subjective     Patient ID: Yeny Beaver is a 28 y.o. female.    Chief Complaint: Healthy You (Pt is fasting today. ) and Leg Pain (Pt c/o right leg pain that has been occurring off and on for months.)    28 year old female presents to clinic for Healthy You visit.   Patient complains of burning/tingling pain that radiates from her right buttock and extends down outside of right leg to just below knee. She states this has been ongoing for several months. Partially relieved with NSAIDs. She is requesting to try Gabapentin to see if this will improve pain. Also reports recently over the last week, her right knee has become very painful. Hurts to bend.   She also reports dizziness at times that comes out of the blue but she immediately feels like she needs to sit down. Thinks it may be related to sinuses.       Review of Systems   Constitutional:  Negative for activity change, appetite change, fatigue and fever.   HENT:  Negative for nasal congestion, ear pain, rhinorrhea, sinus pressure/congestion and sore throat.    Eyes:  Negative for pain, redness, visual disturbance and eye dryness.   Respiratory:  Negative for cough and shortness of breath.    Cardiovascular:  Negative for chest pain and leg swelling.   Gastrointestinal:  Negative for abdominal distention, abdominal pain, constipation and diarrhea.   Endocrine: Negative for cold intolerance, heat intolerance and polyuria.   Genitourinary:  Negative for bladder incontinence, dysuria, frequency and urgency.   Musculoskeletal:  Positive for arthralgias, back pain, leg pain and myalgias. Negative for gait problem.   Integumentary:  Negative for color change, rash and wound.   Allergic/Immunologic: Negative for environmental allergies and food allergies.   Neurological:  Positive for dizziness, light-headedness and headaches. Negative for weakness.   Psychiatric/Behavioral:  Negative for behavioral problems and sleep disturbance.        Tobacco Use: Medium Risk  (6/17/2024)    Patient History     Smoking Tobacco Use: Never     Smokeless Tobacco Use: Never     Passive Exposure: Current     Review of patient's allergies indicates:   Allergen Reactions    Adhesive      Adhesive on hormone patch    Allergen ext-venom-honey bee Other (See Comments)    Insect venom      Bee sting     Current Outpatient Medications   Medication Instructions    albuterol (PROVENTIL) 2.5 mg, Nebulization, Every 4-6 hours PRN    albuterol (PROVENTIL/VENTOLIN HFA) 90 mcg/actuation inhaler 1-2 puffs, Inhalation, Every 6 hours PRN, Rescue    butalbital-acetaminophen-caff -40 mg Cap 1 capsule, Oral, Every 4 hours PRN    cyanocobalamin 1,000 mcg/mL injection cyanocobalamin (vit B-12) 1,000 mcg/mL injection solution  INJECT 1 ML INTRAMUSCULARLY MONTHLY    estradioL (ESTRACE) 2 mg, Oral, 2 times daily    fluticasone-salmeterol diskus inhaler 250-50 mcg 1 puff, Inhalation, Daily PRN, Controller    gabapentin (NEURONTIN) 100 mg, Oral, 3 times daily    methylPREDNISolone (MEDROL DOSEPACK) 4 mg tablet use as directed    nabumetone (RELAFEN) 500 mg, Oral, 2 times daily PRN    omeprazole (PRILOSEC) 40 mg, Oral, Every morning    ondansetron (ZOFRAN-ODT) 8 mg, Oral, Every 6 hours PRN    promethazine (PHENERGAN) 12.5 mg, Oral, 4 times daily PRN    tiZANidine (ZANAFLEX) 4 MG tablet TAKE 1 OR 2 TABLETS BY MOUTH AT BEDTIME AS NEEDED FOR NECK PAIN / SPASM    triamcinolone acetonide 0.1% (KENALOG) 0.1 % ointment Apply to affected area 2 TIMES A DAY, NO LONGER THEN 1 WEEK     Medications Discontinued During This Encounter   Medication Reason    nitrofurantoin, macrocrystal-monohydrate, (MACROBID) 100 MG capsule Therapy completed       Past Medical History:   Diagnosis Date    Cardiac murmur     Endometriosis     History of bee sting allergy     Migraine headache 11/13/2019    PCOS (polycystic ovarian syndrome)     Periumbilical hernia 08/20/2020    repaired by Dr. Dwyer on 10/07/2020     "PONV (postoperative nausea and vomiting)     Retroflexion of uterus 07/31/2018    3rd degree     Health Maintenance Topics with due status: Not Due       Topic Last Completion Date    TETANUS VACCINE 08/31/2019    Influenza Vaccine 10/08/2022     Immunization History   Administered Date(s) Administered    COVID-19, MRNA, LN-S, PF (Pfizer) (Purple Cap) 03/17/2021    DTaP 02/16/1998, 07/14/1998, 09/18/1998, 03/26/2001, 07/23/2002    HPV Quadrivalent 07/09/2012, 05/22/2015, 08/14/2015    Hepatitis A, Pediatric/Adolescent, 2 Dose 08/01/2008, 07/09/2012    Hepatitis B, Pediatric/Adolescent 1995, 03/11/1998, 09/18/1998    HiB PRP-T 1995, 03/11/1998, 09/18/1998    IPV 02/16/1998, 07/14/1998, 09/18/1998, 07/23/2002    Influenza 10/26/2015, 08/24/2017, 10/13/2018, 08/30/2019, 10/19/2021, 10/08/2022    Influenza - Trivalent - PF (ADULT) 09/02/2020    MMR 03/26/2001, 07/23/2002, 08/24/2017    Meningococcal Conjugate (MCV4P) 07/09/2009, 05/22/2015, 08/24/2017    Pneumococcal Conjugate - 13 Valent 10/26/2015    Pneumococcal Conjugate - 20 Valent 10/13/2022    Pneumococcal Polysaccharide - 23 Valent 08/25/2017    Tdap 07/09/2009, 08/31/2019    Varicella 07/23/2002, 08/01/2008       Objective     Body mass index is 38.62 kg/m².  Wt Readings from Last 3 Encounters:   06/17/24 115.2 kg (254 lb)   05/08/24 118.5 kg (261 lb 3.2 oz)   02/28/24 115.7 kg (255 lb)     Ht Readings from Last 3 Encounters:   06/17/24 5' 8" (1.727 m)   05/08/24 5' 8" (1.727 m)   02/28/24 5' 8" (1.727 m)     BP Readings from Last 3 Encounters:   06/17/24 131/81   05/08/24 112/65   03/27/24 (!) 111/57     Temp Readings from Last 3 Encounters:   06/17/24 98.7 °F (37.1 °C) (Oral)   05/08/24 98.3 °F (36.8 °C) (Oral)   03/27/24 97 °F (36.1 °C) (Skin)     Pulse Readings from Last 3 Encounters:   06/17/24 90   05/08/24 82   03/27/24 70     Resp Readings from Last 3 Encounters:   06/17/24 15   05/08/24 19   03/27/24 19     PF Readings from " Last 3 Encounters:   No data found for PF       Physical Exam  Vitals and nursing note reviewed.   Constitutional:       Appearance: She is obese.   HENT:      Head: Normocephalic.      Right Ear: A middle ear effusion is present.      Left Ear: A middle ear effusion is present.      Nose: Nose normal.      Mouth/Throat:      Mouth: Mucous membranes are moist.      Pharynx: Oropharynx is clear. No posterior oropharyngeal erythema.   Eyes:      Conjunctiva/sclera: Conjunctivae normal.   Cardiovascular:      Rate and Rhythm: Normal rate and regular rhythm.      Pulses: Normal pulses.      Heart sounds: Normal heart sounds.   Pulmonary:      Effort: Pulmonary effort is normal.      Breath sounds: Normal breath sounds.   Abdominal:      General: Bowel sounds are normal. There is no distension.      Palpations: Abdomen is soft.   Musculoskeletal:         General: No swelling or tenderness.      Cervical back: Normal range of motion.      Lumbar back: Bony tenderness present.      Right knee: Bony tenderness present. Decreased range of motion.      Right lower leg: No edema.      Left lower leg: No edema.   Skin:     General: Skin is warm and dry.      Capillary Refill: Capillary refill takes less than 2 seconds.   Neurological:      Mental Status: She is alert. Mental status is at baseline.   Psychiatric:         Mood and Affect: Mood normal.         Behavior: Behavior normal.       Assessment and Plan     Problem List Items Addressed This Visit          ENT    Middle ear effusion, bilateral     Most likely contributing to dizziness. Medrol Dose pack as ordered. Discussed nasal sprays and she states she does not want to use any sort of nasal spray. Recommended OTC decongestant use as needed. Sit or lie down immediately when you feel dizzy. Take time going from lying, to sitting, to standing. Drink enough fluids, eat a healthy diet, get enough sleep and avoid stress.             Orthopedic    Acute pain of right knee      Xray showed no abnormality. We will plan to treat her with Toradol IM today in clinic and she was prescribed Medrol Dose Pack for mid ear effusion which may help with knee pain as well. Instructed to rest/ice knee. If symptoms persist follow up for more diagnostic studies.          Relevant Orders    X-Ray Knee 1 or 2 View Right (Completed)    Chronic midline low back pain with right-sided sciatica     Toradol IM in clinic  Medrol Dose Pack as ordered.   Gabapentin TID PRN.   Follow up if symptoms persist or worsen. She declines physical therapy but states she may be open to go to Pain Clinic.           Other Visit Diagnoses       Encounter for general adult medical examination with abnormal findings    -  Primary    Screening for diabetes mellitus        Relevant Orders    Glucose, Fasting    Screening for lipoid disorders        Relevant Orders    Lipid Panel            Plan:   Instructed patient to keep appts as scheduled.   Instructed on DASH diet and increased exercise. Recommended at least 150 minutes a week with resistance training as tolerated. Monitor weight and try to lose some.   Instructed on importance of taking all medications as prescribed.   Discussed yearly dental and eye exams.    Discussed use of sun screen, helmets and seat belts.  Gun safety discussed  Sleep discussed  Stay away from tobacco products    Discussed and provided with a screening schedule and personal prevention plan in accordance with USPSTF age appropriate recommendations and screening guidelines.   Education given and reviewed with patient. Counseling and referrals were provided as needed.  After Visit Summary printed and given to patient which includes written education and a list of any referrals if indicated.      F/u plan for yearly AWV.        I have reviewed the medications, allergies, and problem list.     Goal Actions:    What type of visit is the patient here for today?: Healthy You  Does the patient consent to enroll in  Color Me Healthy?: Yes  Is this a Wellness Follow Up?: No  What is your overall wellness goal? (select at least one): Improve overall health  Choose 3: Biometric, Lifestyle, Nutrition  Biometric Actions: Attend regularly scheduled office visits  Lifestyle Actions : Take medications as prescribed  Nurtrition Actions: Eat a well-balanced diet

## 2024-06-17 NOTE — ASSESSMENT & PLAN NOTE
Toradol IM in clinic  Medrol Dose Pack as ordered.   Gabapentin TID PRN.   Follow up if symptoms persist or worsen. She declines physical therapy but states she may be open to go to Pain Clinic.

## 2024-06-17 NOTE — PATIENT INSTRUCTIONS
"Patient Education       Diet and Health   The Basics   Written by the doctors and editors at [facility]   Why is it important to eat a healthy diet? -- It's important to eat a healthy diet because eating the right foods can keep you healthy now and later on in life.  Which foods are especially healthy? -- Foods that are especially healthy include:  Fruits and vegetables - Eating a diet with lots of fruits and vegetables can help prevent heart disease and strokes. It might also help prevent certain types of cancers. Try to eat fruits and vegetables at each meal and also for snacks. If you don't have fresh fruits and vegetables available, you can eat frozen or canned ones instead. Doctors recommend eating at least 2 1/2 servings of vegetables and 2 servings of fruits each day.  Foods with fiber - Eating foods with a lot of fiber can help prevent heart disease and strokes. If you have type 2 diabetes, it can also help control your blood sugar. Foods that have a lot of fiber include vegetables, fruits, beans, nuts, oatmeal, and whole grain breads and cereals. You can tell how much fiber is in a food by reading the nutrition label (figure 1). Doctors recommend eating 25 to 36 grams of fiber each day.  Foods with folate (also called folic acid) - Folate is a vitamin that is important for pregnant people, since it helps prevent certain birth defects. Anyone who could get pregnant should get at least 400 micrograms of folic acid daily, whether or not they are actively trying to get pregnant. Folate is found in many breakfast cereals, oranges, orange juice, and green leafy vegetables.  Foods with calcium and vitamin D - Babies, children, and adults need calcium and vitamin D to help keep their bones strong. Adults also need calcium and vitamin D to help prevent osteoporosis. Osteoporosis is a condition that causes bones to get "thin" and break more easily than usual. Different foods and drinks have calcium and vitamin D in " "them (figure 2). People who don't get enough calcium and vitamin D in their diet might need to take a supplement.  Foods with protein - Protein helps your muscles stay strong. Healthy foods with a lot of protein include chicken, fish, eggs, beans, nuts, and soy products. Red meat also has a lot of protein, but it also contains fats, which can be unhealthy.  Some experts recommend a "Mediterranean diet." This involves eating a lot of fruits, vegetables, nuts, whole grains, and olive oil. It also includes some fish, poultry, and dairy products, but not a lot of red meat. Eating this way can help your overall health, and might even lower your risk of having a stroke.  What foods should I avoid or limit? -- To eat a healthy diet, there are some things you should avoid or limit. They include:   Fats - There are different types of fats. Some types of fats are better for your body than others.  Trans fats are especially unhealthy. They are found in margarines, many fast foods, and some store-bought baked goods. Trans fats can raise your cholesterol level and your increase your chance of getting heart disease. You should avoid eating foods with these types of fats.  The type of "polyunsaturated" fats found in fish seems to be healthy and can reduce your chance of getting heart disease. Other polyunsaturated fats might also be good for your health. When you cook, it's best to use oils with healthier fats, such as olive oil and canola oil.  Sugar - To have a healthy diet, it's important to limit or avoid sugar, sweets, and refined grains. Refined grains are found in white bread, white rice, most forms of pasta, and most packaged "snack" foods. Whole grains, such as whole-wheat bread and brown rice, have more fiber and are better for your health.  Avoiding sugar-sweetened beverages, like soda and sports drinks, can also help improve your health.  Red meat - Studies have shown that eating a lot of red meat can increase your " "risk of certain health problems, including heart disease and cancer. You should limit the amount of red meat that you eat.  Can I drink alcohol as part of a healthy diet? -- People who drink a small amount of alcohol each day might have a lower chance of getting heart disease. But drinking alcohol can lead to problems. For example, it can raise a person's chances of getting liver disease and certain types of cancers. In women, even 1 drink a day can increase the risk of getting breast cancer.  Most doctors recommend that adult women not have more than 1 drink a day and that adult men not have more than 2 drinks a day. The limits are different because most women's bodies take longer to break down alcohol.  How many calories do I need each day? -- The number of calories you need each day depends on your weight, height, age, sex, and how active you are.  Your doctor or nurse can tell you how many calories you should eat each day. If you are trying to lose weight, you should eat fewer calories each day.  What if I have questions? -- If you have questions about which foods you should or should not eat, ask your doctor or nurse. The right diet for you will depend, in part, on your health and any medical conditions you have.  All topics are updated as new evidence becomes available and our peer review process is complete.  This topic retrieved from [employer] on: Sep 21, 2021.  Topic 12744 Version 20.0  Release: 29.4.2 - C29.263  © 2021 UpToDate, Inc. and/or its affiliates. All rights reserved.  figure 1: Nutrition label - fiber     This is an example of a nutrition label. To figure out how much fiber is in a food, look for the line that says "Dietary Fiber." It's also important to look at the serving size. This food has 7 grams of fiber in each serving, and each serving is 1 cup.  Graphic 42801 Version 7.0    figure 2: Foods and drinks with calcium and vitamin D     Foods rich in calcium include ice cream, soy milk, breads, " "kale, broccoli, milk, cheese, cottage cheese, almonds, yogurt, ready-to-eat cereals, beans, and tofu. Foods rich in vitamin D include milk, fortified plant-based "milks" (soy, almond), canned tuna fish, cod liver oil, yogurt, ready-to-eat-cereals, cooked salmon, canned sardines, mackerel, and eggs. Some of these foods are rich in both.  SnapNames 40265 Version 4.0    Consumer Information Use and Disclaimer   This information is not specific medical advice and does not replace information you receive from your health care provider. This is only a brief summary of general information. It does NOT include all information about conditions, illnesses, injuries, tests, procedures, treatments, therapies, discharge instructions or life-style choices that may apply to you. You must talk with your health care provider for complete information about your health and treatment options. This information should not be used to decide whether or not to accept your health care provider's advice, instructions or recommendations. Only your health care provider has the knowledge and training to provide advice that is right for you. The use of this information is governed by the [employer] End User License Agreement, available at https://[email or web address]/en/solutions/SocialProof/about/lexus.The use of [employer] content is governed by the [employer] Terms of Use. ©2021 UpToDate, Inc. All rights reserved.  Copyright   © 2021 UpToDate, Inc. and/or its affiliates. All rights reserved.    Patient Education       Diet and Health   The Basics   Written by the doctors and editors at [employer]   Why is it important to eat a healthy diet? -- It's important to eat a healthy diet because eating the right foods can keep you healthy now and later on in life.  Which foods are especially healthy? -- Foods that are especially healthy include:  Fruits and vegetables - Eating a diet with lots of fruits and vegetables can help prevent heart disease and strokes. It " "might also help prevent certain types of cancers. Try to eat fruits and vegetables at each meal and also for snacks. If you don't have fresh fruits and vegetables available, you can eat frozen or canned ones instead. Doctors recommend eating at least 2 1/2 servings of vegetables and 2 servings of fruits each day.  Foods with fiber - Eating foods with a lot of fiber can help prevent heart disease and strokes. If you have type 2 diabetes, it can also help control your blood sugar. Foods that have a lot of fiber include vegetables, fruits, beans, nuts, oatmeal, and whole grain breads and cereals. You can tell how much fiber is in a food by reading the nutrition label (figure 1). Doctors recommend eating 25 to 36 grams of fiber each day.  Foods with folate (also called folic acid) - Folate is a vitamin that is important for pregnant people, since it helps prevent certain birth defects. Anyone who could get pregnant should get at least 400 micrograms of folic acid daily, whether or not they are actively trying to get pregnant. Folate is found in many breakfast cereals, oranges, orange juice, and green leafy vegetables.  Foods with calcium and vitamin D - Babies, children, and adults need calcium and vitamin D to help keep their bones strong. Adults also need calcium and vitamin D to help prevent osteoporosis. Osteoporosis is a condition that causes bones to get "thin" and break more easily than usual. Different foods and drinks have calcium and vitamin D in them (figure 2). People who don't get enough calcium and vitamin D in their diet might need to take a supplement.  Foods with protein - Protein helps your muscles stay strong. Healthy foods with a lot of protein include chicken, fish, eggs, beans, nuts, and soy products. Red meat also has a lot of protein, but it also contains fats, which can be unhealthy.  Some experts recommend a "Mediterranean diet." This involves eating a lot of fruits, vegetables, nuts, whole " "grains, and olive oil. It also includes some fish, poultry, and dairy products, but not a lot of red meat. Eating this way can help your overall health, and might even lower your risk of having a stroke.  What foods should I avoid or limit? -- To eat a healthy diet, there are some things you should avoid or limit. They include:   Fats - There are different types of fats. Some types of fats are better for your body than others.  Trans fats are especially unhealthy. They are found in margarines, many fast foods, and some store-bought baked goods. Trans fats can raise your cholesterol level and your increase your chance of getting heart disease. You should avoid eating foods with these types of fats.  The type of "polyunsaturated" fats found in fish seems to be healthy and can reduce your chance of getting heart disease. Other polyunsaturated fats might also be good for your health. When you cook, it's best to use oils with healthier fats, such as olive oil and canola oil.  Sugar - To have a healthy diet, it's important to limit or avoid sugar, sweets, and refined grains. Refined grains are found in white bread, white rice, most forms of pasta, and most packaged "snack" foods. Whole grains, such as whole-wheat bread and brown rice, have more fiber and are better for your health.  Avoiding sugar-sweetened beverages, like soda and sports drinks, can also help improve your health.  Red meat - Studies have shown that eating a lot of red meat can increase your risk of certain health problems, including heart disease and cancer. You should limit the amount of red meat that you eat.  Can I drink alcohol as part of a healthy diet? -- People who drink a small amount of alcohol each day might have a lower chance of getting heart disease. But drinking alcohol can lead to problems. For example, it can raise a person's chances of getting liver disease and certain types of cancers. In women, even 1 drink a day can increase the risk " "of getting breast cancer.  Most doctors recommend that adult women not have more than 1 drink a day and that adult men not have more than 2 drinks a day. The limits are different because most women's bodies take longer to break down alcohol.  How many calories do I need each day? -- The number of calories you need each day depends on your weight, height, age, sex, and how active you are.  Your doctor or nurse can tell you how many calories you should eat each day. If you are trying to lose weight, you should eat fewer calories each day.  What if I have questions? -- If you have questions about which foods you should or should not eat, ask your doctor or nurse. The right diet for you will depend, in part, on your health and any medical conditions you have.  All topics are updated as new evidence becomes available and our peer review process is complete.  This topic retrieved from [facility] on: Sep 21, 2021.  Topic 48382 Version 20.0  Release: 29.4.2 - C29.263  © 2021 UpToDate, Inc. and/or its affiliates. All rights reserved.  figure 1: Nutrition label - fiber     This is an example of a nutrition label. To figure out how much fiber is in a food, look for the line that says "Dietary Fiber." It's also important to look at the serving size. This food has 7 grams of fiber in each serving, and each serving is 1 cup.  Graphic 31225 Version 7.0    figure 2: Foods and drinks with calcium and vitamin D     Foods rich in calcium include ice cream, soy milk, breads, kale, broccoli, milk, cheese, cottage cheese, almonds, yogurt, ready-to-eat cereals, beans, and tofu. Foods rich in vitamin D include milk, fortified plant-based "milks" (soy, almond), canned tuna fish, cod liver oil, yogurt, ready-to-eat-cereals, cooked salmon, canned sardines, mackerel, and eggs. Some of these foods are rich in both.  Graphic 80819 Version 4.0    Consumer Information Use and Disclaimer   This information is not specific medical advice and does not " replace information you receive from your health care provider. This is only a brief summary of general information. It does NOT include all information about conditions, illnesses, injuries, tests, procedures, treatments, therapies, discharge instructions or life-style choices that may apply to you. You must talk with your health care provider for complete information about your health and treatment options. This information should not be used to decide whether or not to accept your health care provider's advice, instructions or recommendations. Only your health care provider has the knowledge and training to provide advice that is right for you. The use of this information is governed by the [employer] End User License Agreement, available at https://www.Exabre.Panaya/en/solutions/ACTV8/about/lexus.The use of [employer] content is governed by the [employer] Terms of Use. ©2021 UpToDate, Inc. All rights reserved.  Copyright   © 2021 UpToDate, Inc. and/or its affiliates. All rights reserved.

## 2024-06-18 ENCOUNTER — OFFICE VISIT (OUTPATIENT)
Dept: GASTROENTEROLOGY | Facility: CLINIC | Age: 29
End: 2024-06-18
Payer: COMMERCIAL

## 2024-06-18 VITALS
WEIGHT: 255 LBS | BODY MASS INDEX: 38.65 KG/M2 | DIASTOLIC BLOOD PRESSURE: 85 MMHG | RESPIRATION RATE: 18 BRPM | HEART RATE: 103 BPM | HEIGHT: 68 IN | SYSTOLIC BLOOD PRESSURE: 115 MMHG

## 2024-06-18 DIAGNOSIS — R10.31 RIGHT LOWER QUADRANT ABDOMINAL PAIN: ICD-10-CM

## 2024-06-18 DIAGNOSIS — R68.81 EARLY SATIETY: Primary | ICD-10-CM

## 2024-06-18 PROCEDURE — 99999 PR PBB SHADOW E&M-EST. PATIENT-LVL V: CPT | Mod: PBBFAC,,,

## 2024-06-18 PROCEDURE — 1160F RVW MEDS BY RX/DR IN RCRD: CPT | Mod: ,,,

## 2024-06-18 PROCEDURE — 3074F SYST BP LT 130 MM HG: CPT | Mod: ,,,

## 2024-06-18 PROCEDURE — 99214 OFFICE O/P EST MOD 30 MIN: CPT | Mod: S$PBB,,,

## 2024-06-18 PROCEDURE — 3008F BODY MASS INDEX DOCD: CPT | Mod: ,,,

## 2024-06-18 PROCEDURE — 99215 OFFICE O/P EST HI 40 MIN: CPT | Mod: PBBFAC

## 2024-06-18 PROCEDURE — 1159F MED LIST DOCD IN RCRD: CPT | Mod: ,,,

## 2024-06-18 PROCEDURE — 3079F DIAST BP 80-89 MM HG: CPT | Mod: ,,,

## 2024-06-18 RX ORDER — POLYETHYLENE GLYCOL 3350, SODIUM SULFATE ANHYDROUS, SODIUM BICARBONATE, SODIUM CHLORIDE, POTASSIUM CHLORIDE 236; 22.74; 6.74; 5.86; 2.97 G/4L; G/4L; G/4L; G/4L; G/4L
4 POWDER, FOR SOLUTION ORAL ONCE
Qty: 4000 ML | Refills: 0 | Status: SHIPPED | OUTPATIENT
Start: 2024-06-18 | End: 2024-06-18

## 2024-06-18 NOTE — PROGRESS NOTES
Gastroenterology Clinic Note    Patient ID: [id number]   Referring MD: Nava Anna FNP   Chief Complaint:   Chief Complaint   Patient presents with    Follow-up       History of Present Illness   Yeny Beaver is an 28 y.o. female who is referred for abdominal pain. Patient reports right sided abdominal pain over the past 2-3 months. Symptoms have been progressively worsening. She is s/p cholecystectomy 03/2022. She reports pain is postprandial. She does have a history of GERD controlled on Prilosec 40 mg daily.     Previous Work-up: EGD; CT Abdomen and pelvis; US Abdomen    Interval  - EGD revealed mild chronic gastritis with negative H pylori; patient reports compliance with PPI therapy  - her symptoms of right-sided abdominal pain or persistent; pain is present almost constantly, but is exacerbated when she eats  - she denies constipation or diarrhea; she does report early satiety and frequent nausea    Review of Systems   Constitutional:  Negative for weight loss.   Gastrointestinal:  Positive for abdominal pain and nausea. Negative for blood in stool, constipation, diarrhea, heartburn, melena and vomiting.       Past Medical History      Past Medical History:   Diagnosis Date    Cardiac murmur     Endometriosis     History of bee sting allergy     Migraine headache 11/13/2019    PCOS (polycystic ovarian syndrome)     Periumbilical hernia 08/20/2020    repaired by Dr. Dwyer on 10/07/2020    PONV (postoperative nausea and vomiting)     Retroflexion of uterus 07/31/2018    3rd degree       Past Surgical History     Past Surgical History:   Procedure Laterality Date    CHOLECYSTECTOMY  3-2022    Cyst removed from cervix      Cyst removed from ovary      DILATION AND CURETTAGE OF UTERUS      HERNIA REPAIR      HYSTERECTOMY      LAPAROSCOPIC CHOLECYSTECTOMY N/A 03/09/2022    Procedure: CHOLECYSTECTOMY, LAPAROSCOPIC;  Surgeon: Denny Dwyer MD;  Location: [facility];  Service: General;  Laterality: N/A;     LOOP ELECTROSURGICAL EXCISION PROCEDURE (LEEP)      Operative Laparoscopy with lysis of adhesions      RH/BSO with limited cystoscopy  09/24/2018    TONSILLECTOMY  2018       Allergies     Review of patient's allergies indicates:   Allergen Reactions    Adhesive      Adhesive on hormone patch    Allergen ext-venom-honey bee Other (See Comments)    Insect venom      Bee sting       Immunization History     Immunization History   Administered Date(s) Administered    COVID-19, MRNA, LN-S, PF (Pfizer) (Purple Cap) 03/17/2021    DTaP 02/16/1998, 07/14/1998, 09/18/1998, 03/26/2001, 07/23/2002    HPV Quadrivalent 07/09/2012, 05/22/2015, 08/14/2015    Hepatitis A, Pediatric/Adolescent, 2 Dose 08/01/2008, 07/09/2012    Hepatitis B, Pediatric/Adolescent 1997, 03/11/1998, 09/18/1998    HiB PRP-T 1997, 03/11/1998, 09/18/1998    IPV 02/16/1998, 07/14/1998, 09/18/1998, 07/23/2002    Influenza 10/26/2015, 08/24/2017, 10/13/2018, 08/30/2019, 10/19/2021, 10/08/2022    Influenza - Trivalent - PF (ADULT) 09/02/2020    MMR 03/26/2001, 07/23/2002, 08/24/2017    Meningococcal Conjugate (MCV4P) 07/09/2009, 05/22/2015, 08/24/2017    Pneumococcal Conjugate - 13 Valent 10/26/2015    Pneumococcal Conjugate - 20 Valent 10/13/2022    Pneumococcal Polysaccharide - 23 Valent 08/25/2017    Tdap 07/09/2009, 08/31/2019    Varicella 07/23/2002, 08/01/2008       Past Family History      Family History   Problem Relation Name Age of Onset    Cancer Mother      No Known Problems Father      Asthma Brother      Breast cancer Other      Diabetes Other         Past Social History      Social History     Socioeconomic History    Marital status:    Tobacco Use    Smoking status: Never     Passive exposure: Current    Smokeless tobacco: Never   Substance and Sexual Activity    Alcohol use: Yes     Alcohol/week: 4.0 standard drinks of alcohol     Types: 2 Glasses of wine, 2 Shots of liquor per week     Comment: occ    Drug use: Never     Sexual activity: Yes     Partners: Male     Birth control/protection: See Surgical Hx   Social History Narrative    Lives at home with .       Current Medications     Outpatient Medications Marked as Taking for the 6/18/24 encounter (Office Visit) with Nava Anna FNP   Medication Sig Dispense Refill    albuterol (PROVENTIL) 2.5 mg /3 mL (0.083 %) nebulizer solution Take 3 mLs (2.5 mg total) by nebulization every 4 to 6 hours as needed for Wheezing. 1 each 1    albuterol (PROVENTIL/VENTOLIN HFA) 90 mcg/actuation inhaler Inhale 1-2 puffs into the lungs every 6 (six) hours as needed for Wheezing. Rescue 18 g 1    butalbital-acetaminophen-caff -40 mg Cap Take 1 capsule by mouth every 4 (four) hours as needed (migraine).      cyanocobalamin 1,000 mcg/mL injection cyanocobalamin (vit B-12) 1,000 mcg/mL injection solution  INJECT 1 ML INTRAMUSCULARLY MONTHLY 1 mL 5    estradioL (ESTRACE) 2 MG tablet Take 1 tablet (2 mg total) by mouth 2 (two) times daily. 60 tablet 2    fluticasone-salmeterol diskus inhaler 250-50 mcg Inhale 1 puff into the lungs daily as needed (wheezing). Controller 1 each 3    gabapentin (NEURONTIN) 100 MG capsule Take 1 capsule (100 mg total) by mouth 3 (three) times daily. 90 capsule 1    methylPREDNISolone (MEDROL DOSEPACK) 4 mg tablet use as directed 21 each 0    nabumetone (RELAFEN) 500 MG tablet Take 500 mg by mouth 2 (two) times daily as needed.      omeprazole (PRILOSEC) 40 MG capsule Take 40 mg by mouth every morning.      ondansetron (ZOFRAN-ODT) 8 MG TbDL Take 1 tablet (8 mg total) by mouth every 6 (six) hours as needed (nausea). 30 tablet 1    promethazine (PHENERGAN) 12.5 MG Tab Take 12.5 mg by mouth 4 (four) times daily as needed.      tiZANidine (ZANAFLEX) 4 MG tablet TAKE 1 OR 2 TABLETS BY MOUTH AT BEDTIME AS NEEDED FOR NECK PAIN / SPASM      triamcinolone acetonide 0.1% (KENALOG) 0.1 % ointment Apply to affected area 2 TIMES A DAY, NO LONGER THEN 1 WEEK 454 g 0  "    Current Facility-Administered Medications for the 6/18/24 encounter (Office Visit) with Nava Anna FNP   Medication Dose Route Frequency Provider Last Rate Last Admin    ketorolac injection 60 mg  60 mg Intramuscular 1 time in Clinic/Valeria Boothe NP        [COMPLETED] ketorolac injection 60 mg  60 mg Intramuscular 1 time in Clinic/Valeria Boothe NP   60 mg at 06/17/24 0949        I have reviewed the current medications, allergies, vital signs, past medical and surgical history, family medical history, and social history for this encounter and agree with all findings.    OBJECTIVE    Physical Exam    /85 (BP Location: Left arm, Patient Position: Sitting)   Pulse 103   Resp 18   Ht 5' 8" (1.727 m)   Wt 115.7 kg (255 lb)   LMP  (LMP Unknown)   BMI 38.77 kg/m²   GEN: Well appearing, cooperative, NAD  NECK: Supple, no LAD  CV: Normal rate  RESP: Unlabored  ABD: ND,  no guarding  EXT: No clubbing, cyanosis, or edema  SKIN: Warm and dry  NEURO: AAO x4.     LABS    CBC (with or without Differential):   Lab Results   Component Value Date    WBC 8.58 02/28/2024    HGB 12.6 02/28/2024    HCT 37.2 (L) 02/28/2024    MCV 82.7 02/28/2024    MCH 28.0 02/28/2024    MCHC 33.9 02/28/2024    RDW 11.9 02/28/2024     (H) 02/28/2024    MPV 10.0 02/28/2024    NEUTOPHILPCT 62.8 02/28/2024    DIFFTYPE Auto 02/28/2024     BMP/CMP:   Lab Results   Component Value Date     02/28/2024    K 4.5 02/28/2024     02/28/2024    CO2 31 02/28/2024    BUN 9 02/28/2024    CREATININE 0.70 02/28/2024    GLU 96 02/28/2024    CALCIUM 9.3 02/28/2024    ALBUMIN 3.3 (L) 02/28/2024    AST 19 02/28/2024    ALT 20 02/28/2024    ALKPHOS 61 02/28/2024        IMAGING  CT abdomen pelvis with IV contrast 03/2024  - No evidence of acute process demonstrated.    US Abdomen Complete  - No acute findings. Mild fatty liver.       ASSESSMENT  Yeny Beaver is a 28 y.o. female with history of GERD and depression " who is referred for abdominal pain.     1. Early satiety    2. Right lower quadrant abdominal pain           PLAN    - gastric emptying study to rule out gastroparesis  - plan for colonoscopy 4 right-sided abdominal pain with no findings on cross-sectional imaging or EGD to explain the patient's pain    There are no Patient Instructions on file for this visit.      Orders Placed This Encounter   Procedures    NM Gastric Emptying     Standing Status:   Future     Standing Expiration Date:   6/18/2025     Order Specific Question:   May the Radiologist modify the order per protocol to meet the clinical needs of the patient?     Answer:   Yes     Order Specific Question:   Release to patient     Answer:   Immediate         The risks and benefits of my recommendations, as well as other treatment options were discussed with the patient today. All questions were answered.    35 minutes of total time spent on the encounter, which includes face to face time and non-face to face time preparing to see the patient (eg, review of tests), obtaining and/or reviewing separately obtained history, documenting clinical information in the electronic or other health record, Independently interpreting results (not separately reported) and communicating results to the patient/family/caregiver, or care coordination (not separately reported).        Nava Anna, JOP/ACNP  [facility]

## 2024-06-18 NOTE — PROGRESS NOTES
Labs reviewedL Fasting glucose and cholesterol looks okay. Cholesterol could be improved. Work hard on low fat/low cholesterol diet with increased exercise as tolerated.

## 2024-06-24 ENCOUNTER — PATIENT MESSAGE (OUTPATIENT)
Dept: FAMILY MEDICINE | Facility: CLINIC | Age: 29
End: 2024-06-24
Payer: COMMERCIAL

## 2024-06-24 ENCOUNTER — TELEPHONE (OUTPATIENT)
Dept: FAMILY MEDICINE | Facility: CLINIC | Age: 29
End: 2024-06-24
Payer: COMMERCIAL

## 2024-06-24 RX ORDER — BREXPIPRAZOLE 1 MG/1
1 TABLET ORAL DAILY
Qty: 30 TABLET | Refills: 5 | Status: SHIPPED | OUTPATIENT
Start: 2024-06-24

## 2024-06-24 RX ORDER — POLYETHYLENE GLYCOL-3350 AND ELECTROLYTES 236; 6.74; 5.86; 2.97; 22.74 G/274.31G; G/274.31G; G/274.31G; G/274.31G; G/274.31G
4000 POWDER, FOR SOLUTION ORAL ONCE
COMMUNITY
Start: 2024-06-18

## 2024-06-24 NOTE — TELEPHONE ENCOUNTER
I sent in Rexulti 1mg. Take half tab once daily x 1 week then increase to 1 tab daily if tolerating well.

## 2024-06-24 NOTE — TELEPHONE ENCOUNTER
Patient sent message through pt portal. She states she would like to start back on Rexulti for her depression. She reports she has not been taking any medicne for depression. She would like a 90 day supply sent to [facility] in [place]. She was on Rexulti 1 mg in the past per patient.

## 2024-07-01 ENCOUNTER — ANESTHESIA (OUTPATIENT)
Dept: GASTROENTEROLOGY | Facility: HOSPITAL | Age: 29
End: 2024-07-01
Payer: COMMERCIAL

## 2024-07-01 ENCOUNTER — HOSPITAL ENCOUNTER (OUTPATIENT)
Dept: GASTROENTEROLOGY | Facility: HOSPITAL | Age: 29
Discharge: HOME | End: 2024-07-01
Admitting: INTERNAL MEDICINE
Payer: COMMERCIAL

## 2024-07-01 ENCOUNTER — ANESTHESIA EVENT (OUTPATIENT)
Dept: GASTROENTEROLOGY | Facility: HOSPITAL | Age: 29
End: 2024-07-01
Payer: COMMERCIAL

## 2024-07-01 VITALS
SYSTOLIC BLOOD PRESSURE: 102 MMHG | WEIGHT: 250 LBS | DIASTOLIC BLOOD PRESSURE: 49 MMHG | HEIGHT: 68 IN | OXYGEN SATURATION: 98 % | TEMPERATURE: 98 F | BODY MASS INDEX: 37.89 KG/M2 | HEART RATE: 64 BPM | RESPIRATION RATE: 18 BRPM

## 2024-07-01 DIAGNOSIS — R10.31 RIGHT LOWER QUADRANT ABDOMINAL PAIN: ICD-10-CM

## 2024-07-01 PROCEDURE — 63600175 PHARM REV CODE 636 W HCPCS: Performed by: INTERNAL MEDICINE

## 2024-07-01 PROCEDURE — 27000284 HC CANNULA NASAL: Performed by: NURSE ANESTHETIST, CERTIFIED REGISTERED

## 2024-07-01 PROCEDURE — 63600175 PHARM REV CODE 636 W HCPCS: Performed by: NURSE ANESTHETIST, CERTIFIED REGISTERED

## 2024-07-01 PROCEDURE — 37000009 HC ANESTHESIA EA ADD 15 MINS

## 2024-07-01 PROCEDURE — 25000003 PHARM REV CODE 250: Performed by: NURSE ANESTHETIST, CERTIFIED REGISTERED

## 2024-07-01 PROCEDURE — 37000008 HC ANESTHESIA 1ST 15 MINUTES

## 2024-07-01 RX ORDER — SODIUM CHLORIDE 0.9 % (FLUSH) 0.9 %
10 SYRINGE (ML) INJECTION
Status: DISCONTINUED | OUTPATIENT
Start: 2024-07-01 | End: 2024-07-02 | Stop reason: HOSPADM

## 2024-07-01 RX ORDER — LIDOCAINE HYDROCHLORIDE 20 MG/ML
INJECTION, SOLUTION EPIDURAL; INFILTRATION; INTRACAUDAL; PERINEURAL
Status: DISCONTINUED | OUTPATIENT
Start: 2024-07-01 | End: 2024-07-01

## 2024-07-01 RX ORDER — ONDANSETRON HYDROCHLORIDE 2 MG/ML
4 INJECTION, SOLUTION INTRAVENOUS ONCE
Status: COMPLETED | OUTPATIENT
Start: 2024-07-01 | End: 2024-07-01

## 2024-07-01 RX ORDER — PROPOFOL 10 MG/ML
VIAL (ML) INTRAVENOUS CONTINUOUS PRN
Status: DISCONTINUED | OUTPATIENT
Start: 2024-07-01 | End: 2024-07-01

## 2024-07-01 RX ADMIN — ONDANSETRON 4 MG: 2 INJECTION INTRAMUSCULAR; INTRAVENOUS at 09:07

## 2024-07-01 RX ADMIN — SODIUM CHLORIDE: 9 INJECTION, SOLUTION INTRAVENOUS at 08:07

## 2024-07-01 RX ADMIN — PROPOFOL 150 MCG/KG/MIN: 10 INJECTION, EMULSION INTRAVENOUS at 08:07

## 2024-07-01 RX ADMIN — LIDOCAINE HYDROCHLORIDE 80 MG: 20 INJECTION, SOLUTION INTRAVENOUS at 08:07

## 2024-07-01 NOTE — ANESTHESIA POSTPROCEDURE EVALUATION
Anesthesia Post Evaluation    Patient: Yeny Beaver    Procedure(s) Performed: * Colonoscopy *    Final Anesthesia Type: general      Patient location during evaluation: GI PACU  Patient participation: Yes- Able to Participate  Level of consciousness: awake and alert  Post-procedure vital signs: reviewed and stable  Pain management: adequate  Airway patency: patent    PONV status at discharge: No PONV  Anesthetic complications: no      Cardiovascular status: blood pressure returned to baseline and hemodynamically stable  Respiratory status: spontaneous ventilation  Hydration status: euvolemic  Follow-up not needed.  Comments: Refer to nursing notes for pain/antoinette score upon discharge from recovery.              Vitals Value Taken Time   /53 07/01/24 0936   Temp 36.7 °C (98 °F) 07/01/24 0910   Pulse 75 07/01/24 0937   Resp 17 07/01/24 0937   SpO2 99 % 07/01/24 0937   Vitals shown include unfiled device data.      Event Time   Out of Recovery 09:39:01         Pain/Antoinette Score: Antoinette Score: 10 (7/1/2024  9:18 AM)

## 2024-07-01 NOTE — TRANSFER OF CARE
"Anesthesia Transfer of Care Note    Patient: Yeny Beaver    Procedure(s) Performed: * colonoscopy*    Patient location: GI    Anesthesia Type: MAC    Transport from OR: Transported from OR on room air with adequate spontaneous ventilation. Continuous ECG monitoring in transport. Continuous SpO2 monitoring in transport    Post pain: adequate analgesia    Post assessment: no apparent anesthetic complications    Post vital signs: stable    Level of consciousness: sedated and responds to stimulation    Nausea/Vomiting: no nausea/vomiting    Complications: none    Transfer of care protocol was followedComments: Good SV continue, NAD, VSS, RTRN      Last vitals: Visit Vitals  BP (!) 81/48   Pulse 74   Temp 36.7 °C (98 °F)   Resp 16   Ht 5' 8" (1.727 m)   Wt 113.4 kg (250 lb)   LMP  (LMP Unknown)   SpO2 97%   Breastfeeding No   BMI 38.01 kg/m²     "

## 2024-07-01 NOTE — H&P
Gastroenterology Pre-procedure H&P    History of Present Illness    Yeny Beaver is a 28 y.o. female that  has a past medical history of Cardiac murmur, Endometriosis, History of bee sting allergy, Migraine headache (11/13/2019), PCOS (polycystic ovarian syndrome), Periumbilical hernia (08/20/2020), PONV (postoperative nausea and vomiting), and Retroflexion of uterus (07/31/2018).     Patient with RUQ abdominal pain here for diagnostic colonoscopy. Has had EGD and cross sectional imaging with no etiology.       Past Medical History:   Diagnosis Date    Cardiac murmur     Endometriosis     History of bee sting allergy     Migraine headache 11/13/2019    PCOS (polycystic ovarian syndrome)     Periumbilical hernia 08/20/2020    repaired by Dr. Dwyer on 10/07/2020    PONV (postoperative nausea and vomiting)     Retroflexion of uterus 07/31/2018    3rd degree       Past Surgical History:   Procedure Laterality Date    CHOLECYSTECTOMY  3-2022    Cyst removed from cervix      Cyst removed from ovary      DILATION AND CURETTAGE OF UTERUS      HERNIA REPAIR      HYSTERECTOMY      LAPAROSCOPIC CHOLECYSTECTOMY N/A 03/09/2022    Procedure: CHOLECYSTECTOMY, LAPAROSCOPIC;  Surgeon: Denny Dwyer MD;  Location: [facility];  Service: General;  Laterality: N/A;    LOOP ELECTROSURGICAL EXCISION PROCEDURE (LEEP)      Operative Laparoscopy with lysis of adhesions      RH/BSO with limited cystoscopy  09/24/2018    TONSILLECTOMY  2018       Family History   Problem Relation Name Age of Onset    Cancer Mother      No Known Problems Father      Asthma Brother      Breast cancer Other      Diabetes Other         Social History     Socioeconomic History    Marital status:    Tobacco Use    Smoking status: Never     Passive exposure: Current    Smokeless tobacco: Never   Substance and Sexual Activity    Alcohol use: Yes     Alcohol/week: 4.0 standard drinks of alcohol     Types: 2 Glasses of wine, 2 Shots of liquor per week      Comment: occ    Drug use: Never    Sexual activity: Yes     Partners: Male     Birth control/protection: See Surgical Hx   Social History Narrative    Lives at home with .       Current Outpatient Medications   Medication Sig Dispense Refill    albuterol (PROVENTIL) 2.5 mg /3 mL (0.083 %) nebulizer solution Take 3 mLs (2.5 mg total) by nebulization every 4 to 6 hours as needed for Wheezing. 1 each 1    albuterol (PROVENTIL/VENTOLIN HFA) 90 mcg/actuation inhaler Inhale 1-2 puffs into the lungs every 6 (six) hours as needed for Wheezing. Rescue 18 g 1    brexpiprazole (REXULTI) 1 mg Tab Take 1 tablet (1 mg total) by mouth once daily. 30 tablet 5    butalbital-acetaminophen-caff -40 mg Cap Take 1 capsule by mouth every 4 (four) hours as needed (migraine).      cyanocobalamin 1,000 mcg/mL injection cyanocobalamin (vit B-12) 1,000 mcg/mL injection solution  INJECT 1 ML INTRAMUSCULARLY MONTHLY 1 mL 5    estradioL (ESTRACE) 2 MG tablet Take 1 tablet (2 mg total) by mouth 2 (two) times daily. 60 tablet 2    fluticasone-salmeterol diskus inhaler 250-50 mcg Inhale 1 puff into the lungs daily as needed (wheezing). Controller 1 each 3    gabapentin (NEURONTIN) 100 MG capsule Take 1 capsule (100 mg total) by mouth 3 (three) times daily. 90 capsule 1    GAVILYTE-G 236-22.74-6.74 -5.86 gram suspension Take 4,000 mLs by mouth once.      methylPREDNISolone (MEDROL DOSEPACK) 4 mg tablet use as directed 21 each 0    nabumetone (RELAFEN) 500 MG tablet Take 500 mg by mouth 2 (two) times daily as needed.      omeprazole (PRILOSEC) 40 MG capsule Take 40 mg by mouth every morning.      ondansetron (ZOFRAN-ODT) 8 MG TbDL Take 1 tablet (8 mg total) by mouth every 6 (six) hours as needed (nausea). 30 tablet 1    promethazine (PHENERGAN) 12.5 MG Tab Take 12.5 mg by mouth 4 (four) times daily as needed.      tiZANidine (ZANAFLEX) 4 MG tablet TAKE 1 OR 2 TABLETS BY MOUTH AT BEDTIME AS NEEDED FOR NECK PAIN / SPASM      triamcinolone  "acetonide 0.1% (KENALOG) 0.1 % ointment Apply to affected area 2 TIMES A DAY, NO LONGER THEN 1 WEEK 454 g 0     Current Facility-Administered Medications   Medication Dose Route Frequency Provider Last Rate Last Admin    ketorolac injection 60 mg  60 mg Intramuscular 1 time in Clinic/HOD Valeria Melendrez NP           Review of patient's allergies indicates:   Allergen Reactions    Adhesive      Adhesive on hormone patch    Allergen ext-venom-honey bee Other (See Comments)    Insect venom      Bee sting       Objective:  Vitals:    07/01/24 0749   BP: 122/62   Pulse: 90   Resp: 16   Temp: 98 °F (36.7 °C)   TempSrc: Oral   SpO2: 96%   Weight: 113.4 kg (250 lb)   Height: 5' 8" (1.727 m)        GEN: normal appearing, NAD, AAO x3  HENT: NCAT, anicteric, OP benign  CV: normal rate, regular rhythm  RESP: NABS, symmetric rise, unlabored  ABD: soft, ND, no guarding or TTP  SKIN: warm and dry  NEURO: grossly afocal    Assessment and Plan:    Proceed with:    Colonoscopy for chronic abdominal pain    Williams Morrissey MD  Gastroenterology  "

## 2024-07-01 NOTE — DISCHARGE INSTRUCTIONS
Procedure Date  7/1/24     Impression  Overall Impression:   The terminal ileum, ileocecal valve, cecum, ascending colon, transverse colon, descending colon and sigmoid colon appeared normal.  (grade 1) hemorrhoids        Recommendation  Start screening colonoscopies at appropriate age per current guidelines  Agree with GES to complete workup  Recommend daily bowel regimen as below  Follow up in GI clinic as scheduled or sooner prn     Start a daily fiber supplement with Citrucel or Fibercon (can purchase at your local pharmacy)  Use Miralax 17 grams daily-to-twice daily as needed to have a regular, soft BM without straining  Drink at least 60-80 ounces of water daily unless you have a medical condition that requires fluid restriction      Outcome of procedure: successful Colonoscopy  Disposition: patient to recovery following procedure; discharge to home when appropriate parameters met  Provisions for follow up: please call my office for any unexpected symptoms like chest or abdominal pain or bleeding following your procedure.  Final Diagnosis: constipation      NO DRIVING, OPERATING EQUIPMENT, OR SIGNING LEGAL DOCUMENTS FOR 24 HOURS.  THE NURSE WILL CALL YOU WITH YOUR BIOPSY RESULTS IN A FEW DAYS. IF YOU HAVE  [facility] MYCHART YOUR RESULTS WILL APPEAR THERE AS WELL.  Please call the GI Lab if you have any nausea, vomiting, or abdominal pain.

## 2024-07-02 ENCOUNTER — OFFICE VISIT (OUTPATIENT)
Dept: FAMILY MEDICINE | Facility: CLINIC | Age: 29
End: 2024-07-02
Payer: COMMERCIAL

## 2024-07-02 VITALS
BODY MASS INDEX: 38.59 KG/M2 | HEART RATE: 68 BPM | HEIGHT: 68 IN | RESPIRATION RATE: 20 BRPM | WEIGHT: 254.63 LBS | SYSTOLIC BLOOD PRESSURE: 134 MMHG | DIASTOLIC BLOOD PRESSURE: 78 MMHG | TEMPERATURE: 98 F

## 2024-07-02 DIAGNOSIS — R53.83 FATIGUE, UNSPECIFIED TYPE: ICD-10-CM

## 2024-07-02 DIAGNOSIS — Z79.890 HORMONE REPLACEMENT THERAPY: ICD-10-CM

## 2024-07-02 DIAGNOSIS — R07.9 CHEST PAIN, UNSPECIFIED TYPE: Primary | ICD-10-CM

## 2024-07-02 DIAGNOSIS — K21.9 GASTROESOPHAGEAL REFLUX DISEASE WITHOUT ESOPHAGITIS: ICD-10-CM

## 2024-07-02 DIAGNOSIS — M94.0 COSTOCHONDRITIS: ICD-10-CM

## 2024-07-02 LAB
BASOPHILS # BLD AUTO: 0.04 K/UL (ref 0–0.2)
BASOPHILS NFR BLD AUTO: 0.5 % (ref 0–1)
DIFFERENTIAL METHOD BLD: ABNORMAL
EOSINOPHIL # BLD AUTO: 0.08 K/UL (ref 0–0.5)
EOSINOPHIL NFR BLD AUTO: 0.9 % (ref 1–4)
ERYTHROCYTE [DISTWIDTH] IN BLOOD BY AUTOMATED COUNT: 11.9 % (ref 11.5–14.5)
ESTRADIOL SERPL-MCNC: 298.5 PG/ML
HCT VFR BLD AUTO: 37.3 % (ref 38–47)
HGB BLD-MCNC: 11.9 G/DL (ref 12–16)
IMM GRANULOCYTES # BLD AUTO: 0.03 K/UL (ref 0–0.04)
IMM GRANULOCYTES NFR BLD: 0.3 % (ref 0–0.4)
IRON SATN MFR SERPL: 13 % (ref 14–50)
IRON SERPL-MCNC: 45 ΜG/DL (ref 50–170)
LYMPHOCYTES # BLD AUTO: 2.43 K/UL (ref 1–4.8)
LYMPHOCYTES NFR BLD AUTO: 28.2 % (ref 27–41)
MCH RBC QN AUTO: 27.5 PG (ref 27–31)
MCHC RBC AUTO-ENTMCNC: 31.9 G/DL (ref 32–36)
MCV RBC AUTO: 86.3 FL (ref 80–96)
MONOCYTES # BLD AUTO: 0.62 K/UL (ref 0–0.8)
MONOCYTES NFR BLD AUTO: 7.2 % (ref 2–6)
MPC BLD CALC-MCNC: 10 FL (ref 9.4–12.4)
NEUTROPHILS # BLD AUTO: 5.42 K/UL (ref 1.8–7.7)
NEUTROPHILS NFR BLD AUTO: 62.9 % (ref 53–65)
NRBC # BLD AUTO: 0 X10E3/UL
NRBC, AUTO (.00): 0 %
PLATELET # BLD AUTO: 397 K/UL (ref 150–400)
RBC # BLD AUTO: 4.32 M/UL (ref 4.2–5.4)
T3FREE SERPL-MCNC: 3.25 PG/ML (ref 2.18–3.98)
T4 FREE SERPL-MCNC: 1.06 NG/DL (ref 0.76–1.46)
TIBC SERPL-MCNC: 357 ΜG/DL (ref 250–450)
TSH SERPL DL<=0.005 MIU/L-ACNC: 1.75 UIU/ML (ref 0.36–3.74)
WBC # BLD AUTO: 8.62 K/UL (ref 4.5–11)

## 2024-07-02 PROCEDURE — 83550 IRON BINDING TEST: CPT | Mod: ,,, | Performed by: CLINICAL MEDICAL LABORATORY

## 2024-07-02 PROCEDURE — 84443 ASSAY THYROID STIM HORMONE: CPT | Mod: ,,, | Performed by: CLINICAL MEDICAL LABORATORY

## 2024-07-02 PROCEDURE — 82670 ASSAY OF TOTAL ESTRADIOL: CPT | Mod: ,,, | Performed by: CLINICAL MEDICAL LABORATORY

## 2024-07-02 PROCEDURE — 85025 COMPLETE CBC W/AUTO DIFF WBC: CPT | Mod: ,,, | Performed by: CLINICAL MEDICAL LABORATORY

## 2024-07-02 PROCEDURE — 83540 ASSAY OF IRON: CPT | Mod: ,,, | Performed by: CLINICAL MEDICAL LABORATORY

## 2024-07-02 PROCEDURE — 84481 FREE ASSAY (FT-3): CPT | Mod: ,,, | Performed by: CLINICAL MEDICAL LABORATORY

## 2024-07-02 PROCEDURE — 84439 ASSAY OF FREE THYROXINE: CPT | Mod: ,,, | Performed by: CLINICAL MEDICAL LABORATORY

## 2024-07-02 RX ORDER — GABAPENTIN 300 MG/1
300 CAPSULE ORAL 3 TIMES DAILY
Qty: 90 CAPSULE | Refills: 1 | Status: SHIPPED | OUTPATIENT
Start: 2024-07-02 | End: 2025-07-02

## 2024-07-02 RX ORDER — KETOROLAC TROMETHAMINE 10 MG/1
10 TABLET, FILM COATED ORAL EVERY 6 HOURS
Qty: 20 TABLET | Refills: 0 | Status: SHIPPED | OUTPATIENT
Start: 2024-07-02 | End: 2024-07-07

## 2024-07-02 RX ORDER — OMEPRAZOLE 40 MG/1
40 CAPSULE, DELAYED RELEASE ORAL EVERY MORNING
Qty: 90 CAPSULE | Refills: 1 | Status: SHIPPED | OUTPATIENT
Start: 2024-07-02

## 2024-07-02 NOTE — PROGRESS NOTES
Valeria Melendrez NP   [place] [address]  [place]  [address]      PATIENT NAME: Yeny Beaver  : 1995  DATE: 24  MRN: [id number]      Billing Provider: Valeria Melendrez NP  Level of Service: [place] OFFICE/OUTPT VISIT, EST, LEVL IV, 30-39 MIN  Patient PCP Information       Provider PCP Type    Valeria Melendrez NP General            Reason for Visit / Chief Complaint: Chest Pain (Patient is complaining of upper mid chest pain off and on x 2-3 weeks. She does report some shortness of breath at times. )         History of Present Illness / Problem Focused Workflow     28 year old female presents to clinic with complaints of upper mid chest pain in sternal area off and on x 2-3 weeks. She does report some shortness of breath at times. Reports she thought it was muscle spasms but now does not think this is the case. Pain is reproducible with palpation. She is also requesting refill on her Gabapentin and Omeprazole.           Review of Systems     @Review of Systems   Constitutional:  Negative for activity change and unexpected weight change.   HENT:  Negative for hearing loss, rhinorrhea and trouble swallowing.    Eyes:  Negative for discharge and visual disturbance.   Respiratory:  Negative for wheezing.    Cardiovascular:  Positive for chest pain.   Gastrointestinal:  Negative for blood in stool, constipation, diarrhea and vomiting.   Endocrine: Negative for polydipsia and polyuria.   Genitourinary:  Negative for difficulty urinating, dysuria, hematuria and menstrual problem.   Musculoskeletal:  Negative for arthralgias, joint swelling and neck pain.   Neurological:  Positive for headaches. Negative for weakness.   Psychiatric/Behavioral:  Negative for confusion and dysphoric mood.        Medical / Social / Family History     Past Medical History:   Diagnosis Date    Cardiac murmur     Endometriosis     History of bee sting allergy     Migraine headache 2019    PCOS  (polycystic ovarian syndrome)     Periumbilical hernia 08/20/2020    repaired by Dr. Dwyer on 10/07/2020    PONV (postoperative nausea and vomiting)     Retroflexion of uterus 07/31/2018    3rd degree       Past Surgical History:   Procedure Laterality Date    CHOLECYSTECTOMY  3-2022    Cyst removed from cervix      Cyst removed from ovary      DILATION AND CURETTAGE OF UTERUS      HERNIA REPAIR      HYSTERECTOMY      LAPAROSCOPIC CHOLECYSTECTOMY N/A 03/09/2022    Procedure: CHOLECYSTECTOMY, LAPAROSCOPIC;  Surgeon: Denny Dwyer MD;  Location: [facility];  Service: General;  Laterality: N/A;    LOOP ELECTROSURGICAL EXCISION PROCEDURE (LEEP)      Operative Laparoscopy with lysis of adhesions      RH/BSO with limited cystoscopy  09/24/2018    TONSILLECTOMY  2018       Medications and Allergies     Medications  Outpatient Medications Marked as Taking for the 7/2/24 encounter (Office Visit) with Valeria Melendrez NP   Medication Sig Dispense Refill    albuterol (PROVENTIL) 2.5 mg /3 mL (0.083 %) nebulizer solution Take 3 mLs (2.5 mg total) by nebulization every 4 to 6 hours as needed for Wheezing. 1 each 1    albuterol (PROVENTIL/VENTOLIN HFA) 90 mcg/actuation inhaler Inhale 1-2 puffs into the lungs every 6 (six) hours as needed for Wheezing. Rescue 18 g 1    brexpiprazole (REXULTI) 1 mg Tab Take 1 tablet (1 mg total) by mouth once daily. 30 tablet 5    butalbital-acetaminophen-caff -40 mg Cap Take 1 capsule by mouth every 4 (four) hours as needed (migraine).      cyanocobalamin 1,000 mcg/mL injection cyanocobalamin (vit B-12) 1,000 mcg/mL injection solution  INJECT 1 ML INTRAMUSCULARLY MONTHLY (Patient taking differently: Inject 1,000 mcg into the muscle every 30 days.) 1 mL 5    estradioL (ESTRACE) 2 MG tablet Take 1 tablet (2 mg total) by mouth 2 (two) times daily. 60 tablet 2    fluticasone-salmeterol diskus inhaler 250-50 mcg Inhale 1 puff into the lungs daily as needed (wheezing).  Controller 1 each 3    nabumetone (RELAFEN) 500 MG tablet Take 500 mg by mouth 2 (two) times daily as needed.      ondansetron (ZOFRAN-ODT) 8 MG TbDL Take 1 tablet (8 mg total) by mouth every 6 (six) hours as needed (nausea). 30 tablet 1    promethazine (PHENERGAN) 12.5 MG Tab Take 12.5 mg by mouth 4 (four) times daily as needed.      tiZANidine (ZANAFLEX) 4 MG tablet TAKE 1 OR 2 TABLETS BY MOUTH AT BEDTIME AS NEEDED FOR NECK PAIN / SPASM      triamcinolone acetonide 0.1% (KENALOG) 0.1 % ointment Apply to affected area 2 TIMES A DAY, NO LONGER THEN 1 WEEK 454 g 0    [DISCONTINUED] gabapentin (NEURONTIN) 100 MG capsule Take 1 capsule (100 mg total) by mouth 3 (three) times daily. 90 capsule 1    [DISCONTINUED] omeprazole (PRILOSEC) 40 MG capsule Take 40 mg by mouth every morning.       Current Facility-Administered Medications for the 7/2/24 encounter (Office Visit) with Valeria Melendrez NP   Medication Dose Route Frequency Provider Last Rate Last Admin    [DISCONTINUED] ketorolac injection 60 mg  60 mg Intramuscular 1 time in Clinic/HOD Valeria Melendrez NP           Allergies  Review of patient's allergies indicates:   Allergen Reactions    Adhesive      Adhesive on hormone patch    Allergen ext-venom-honey bee Other (See Comments)    Insect venom      Bee sting       Physical Examination     Vitals:    07/02/24 1335   BP: 134/78   Pulse: 68   Resp: 20   Temp: 98.3 °F (36.8 °C)     Physical Exam  Vitals and nursing note reviewed.   Constitutional:       Appearance: She is obese.   HENT:      Head: Normocephalic.      Nose: Nose normal.      Mouth/Throat:      Mouth: Mucous membranes are moist.      Pharynx: Oropharynx is clear. No posterior oropharyngeal erythema.   Eyes:      Conjunctiva/sclera: Conjunctivae normal.   Cardiovascular:      Rate and Rhythm: Normal rate and regular rhythm.      Pulses: Normal pulses.      Heart sounds: Normal heart sounds.   Pulmonary:      Effort: Pulmonary effort is  normal.      Breath sounds: Normal breath sounds.   Chest:      Chest wall: Tenderness present.      Comments: Mid to upper sternal tenderness reproducible by palpation.   Abdominal:      General: Abdomen is flat. Bowel sounds are normal. There is no distension.      Palpations: Abdomen is soft.   Musculoskeletal:         General: No swelling or tenderness. Normal range of motion.      Cervical back: Normal range of motion.      Right lower leg: No edema.      Left lower leg: No edema.   Skin:     General: Skin is warm and dry.      Capillary Refill: Capillary refill takes less than 2 seconds.   Neurological:      Mental Status: She is alert. Mental status is at baseline.   Psychiatric:         Mood and Affect: Mood normal.         Behavior: Behavior normal.             Lab Results   Component Value Date    WBC 8.58 02/28/2024    HGB 12.6 02/28/2024    HCT 37.2 (L) 02/28/2024    MCV 82.7 02/28/2024     (H) 02/28/2024        CMP  Sodium   Date Value Ref Range Status   02/28/2024 139 136 - 145 mmol/L Final     Potassium   Date Value Ref Range Status   02/28/2024 4.5 3.5 - 5.1 mmol/L Final     Chloride   Date Value Ref Range Status   02/28/2024 106 98 - 107 mmol/L Final     CO2   Date Value Ref Range Status   02/28/2024 31 21 - 32 mmol/L Final     Glucose   Date Value Ref Range Status   02/28/2024 96 74 - 106 mg/dL Final     BUN   Date Value Ref Range Status   02/28/2024 9 7 - 18 mg/dL Final     Creatinine   Date Value Ref Range Status   02/28/2024 0.70 0.55 - 1.02 mg/dL Final     Calcium   Date Value Ref Range Status   02/28/2024 9.3 8.5 - 10.1 mg/dL Final     Total Protein   Date Value Ref Range Status   02/28/2024 7.7 6.4 - 8.2 g/dL Final     Albumin   Date Value Ref Range Status   02/28/2024 3.3 (L) 3.5 - 5.0 g/dL Final     Bilirubin, Total   Date Value Ref Range Status   02/28/2024 0.2 >0.0 - 1.2 mg/dL Final     Alk Phos   Date Value Ref Range Status   02/28/2024 61 37 - 98 U/L Final     AST   Date Value  Ref Range Status   02/28/2024 19 15 - 37 U/L Final     ALT   Date Value Ref Range Status   02/28/2024 20 13 - 56 U/L Final     Anion Gap   Date Value Ref Range Status   02/28/2024 7 7 - 16 mmol/L Final     eGFR   Date Value Ref Range Status   02/28/2024 123 >=60 mL/min/1.73m2 Final     Procedures   Assessment and Plan (including Health Maintenance)   :    Plan:     Problem List Items Addressed This Visit          Endocrine    Hormone replacement therapy    Relevant Orders    Estradiol       GI    Gastroesophageal reflux disease without esophagitis    Current Assessment & Plan     Patient reports well controlled on Prilosec. Continue current dosage. Follow up in 3 months or as needed.          Relevant Medications    omeprazole (PRILOSEC) 40 MG capsule       Orthopedic    Costochondritis    Current Assessment & Plan     EKG obtained and was normal. Pain is reproducible with palpation which lends more toward musculoskeletal pain. She does report some shortness of breath at times. Will check CBC and Iron as she reports history of anemia.           Other Visit Diagnoses       Chest pain, unspecified type    -  Primary    Relevant Orders    POCT EKG 12-LEAD (Manually Resulted by Ordering Provider)    Fatigue, unspecified type        Relevant Orders    TSH    T3, Free    T4, Free    CBC Auto Differential    Iron and TIBC            Health Maintenance Topics with due status: Not Due       Topic Last Completion Date    TETANUS VACCINE 08/31/2019    Influenza Vaccine 10/08/2022       Future Appointments   Date Time Provider Department Center   7/3/2024  9:00 AM [address]   12/18/2024  8:00 AM Nava Anna FNP [facility]   6/17/2025  8:00 AM Valeria Melendrez NP [facility]        Health Maintenance Due   Topic Date Due    COVID-19 Vaccine (2 - 2023-24 season) 09/01/2023          Signature:  Valeria Melendrez NP  [address]  [place]   [address]    Date of encounter: 7/2/24

## 2024-07-02 NOTE — ASSESSMENT & PLAN NOTE
EKG obtained and was normal. Pain is reproducible with palpation which lends more toward musculoskeletal pain. She does report some shortness of breath at times. Will check CBC and Iron as she reports history of anemia.

## 2024-07-03 ENCOUNTER — TELEPHONE (OUTPATIENT)
Dept: GASTROENTEROLOGY | Facility: CLINIC | Age: 29
End: 2024-07-03
Payer: COMMERCIAL

## 2024-07-03 ENCOUNTER — HOSPITAL ENCOUNTER (OUTPATIENT)
Dept: RADIOLOGY | Facility: HOSPITAL | Age: 29
Discharge: HOME | End: 2024-07-03
Payer: COMMERCIAL

## 2024-07-03 DIAGNOSIS — R68.81 EARLY SATIETY: ICD-10-CM

## 2024-07-03 DIAGNOSIS — D50.9 IRON DEFICIENCY ANEMIA, UNSPECIFIED IRON DEFICIENCY ANEMIA TYPE: Primary | ICD-10-CM

## 2024-07-03 PROCEDURE — 78264 GASTRIC EMPTYING IMG STUDY: CPT | Mod: TC

## 2024-07-03 PROCEDURE — A9541 TC99M SULFUR COLLOID: HCPCS

## 2024-07-03 RX ORDER — TECHNETIUM TC 99M SULFUR COLLOID 2 MG
457 KIT MISCELLANEOUS
Status: COMPLETED | OUTPATIENT
Start: 2024-07-03 | End: 2024-07-03

## 2024-07-03 RX ORDER — FERROUS GLUCONATE 324(38)MG
324 TABLET ORAL
Qty: 30 TABLET | Refills: 5 | Status: SHIPPED | OUTPATIENT
Start: 2024-07-03

## 2024-07-03 RX ADMIN — TECHNETIUM TC 99M SULFUR COLLOID KIT 0.46 MILLICURIE: KIT at 09:07

## 2024-07-03 NOTE — PROGRESS NOTES
Labs reviewed: Iron and Iron sat slightly low. This could be contributing to symptoms of fatigue and shortness of breath. I have sent in Iron supplementation for her. Hgb and Hct levels very slightly low most likely related to iron deficiency anemia. Increase iron in diet as well. Foods high in iron include red meats, chick, turkey, eggs, salmon, tuna, liver, nuts and seeds, dried fruit, dark leafy green veggies such as spinach or broccoli, beans, and iron fortified cereals.    Thyroid levels normal. Estradiol levels normal for someone on hormone supplementation.

## 2024-08-09 ENCOUNTER — OFFICE VISIT (OUTPATIENT)
Dept: FAMILY MEDICINE | Facility: CLINIC | Age: 29
End: 2024-08-09
Payer: COMMERCIAL

## 2024-08-09 VITALS
BODY MASS INDEX: 39.53 KG/M2 | TEMPERATURE: 98 F | DIASTOLIC BLOOD PRESSURE: 74 MMHG | HEART RATE: 86 BPM | SYSTOLIC BLOOD PRESSURE: 126 MMHG | OXYGEN SATURATION: 99 % | WEIGHT: 260 LBS

## 2024-08-09 DIAGNOSIS — R09.81 SINUS CONGESTION: ICD-10-CM

## 2024-08-09 DIAGNOSIS — J01.40 ACUTE NON-RECURRENT PANSINUSITIS: Primary | ICD-10-CM

## 2024-08-09 LAB
CTP QC/QA: YES
CTP QC/QA: YES
POC MOLECULAR INFLUENZA A AGN: NEGATIVE
POC MOLECULAR INFLUENZA B AGN: NEGATIVE
SARS-COV-2 RDRP RESP QL NAA+PROBE: NEGATIVE

## 2024-08-09 RX ORDER — FLUCONAZOLE 150 MG/1
TABLET ORAL
Qty: 2 TABLET | Refills: 2 | Status: SHIPPED | OUTPATIENT
Start: 2024-08-09

## 2024-08-09 RX ORDER — SUMATRIPTAN 10 MG/1
1 SPRAY NASAL
COMMUNITY
Start: 2024-07-18

## 2024-08-09 RX ORDER — DEXAMETHASONE SODIUM PHOSPHATE 4 MG/ML
4 INJECTION, SOLUTION INTRA-ARTICULAR; INTRALESIONAL; INTRAMUSCULAR; INTRAVENOUS; SOFT TISSUE
Status: COMPLETED | OUTPATIENT
Start: 2024-08-09 | End: 2024-08-09

## 2024-08-09 RX ORDER — AMOXICILLIN 500 MG/1
500 TABLET, FILM COATED ORAL EVERY 12 HOURS
Qty: 20 TABLET | Refills: 0 | Status: SHIPPED | OUTPATIENT
Start: 2024-08-09 | End: 2024-08-19

## 2024-08-09 RX ORDER — CEFTRIAXONE 1 G/1
1 INJECTION, POWDER, FOR SOLUTION INTRAMUSCULAR; INTRAVENOUS
Status: COMPLETED | OUTPATIENT
Start: 2024-08-09 | End: 2024-08-09

## 2024-08-09 RX ORDER — ONABOTULINUMTOXINA 200 [USP'U]/1
155 INJECTION, POWDER, LYOPHILIZED, FOR SOLUTION INTRADERMAL; INTRAMUSCULAR
COMMUNITY

## 2024-08-09 RX ORDER — SCOLOPAMINE TRANSDERMAL SYSTEM 1 MG/1
1 PATCH, EXTENDED RELEASE TRANSDERMAL
COMMUNITY
Start: 2024-07-18

## 2024-08-09 RX ADMIN — CEFTRIAXONE 1 G: 1 INJECTION, POWDER, FOR SOLUTION INTRAMUSCULAR; INTRAVENOUS at 11:08

## 2024-08-09 RX ADMIN — DEXAMETHASONE SODIUM PHOSPHATE 4 MG: 4 INJECTION, SOLUTION INTRA-ARTICULAR; INTRALESIONAL; INTRAMUSCULAR; INTRAVENOUS; SOFT TISSUE at 11:08

## 2024-09-16 ENCOUNTER — OFFICE VISIT (OUTPATIENT)
Dept: FAMILY MEDICINE | Facility: CLINIC | Age: 29
End: 2024-09-16
Payer: COMMERCIAL

## 2024-09-16 VITALS
TEMPERATURE: 99 F | BODY MASS INDEX: 40.16 KG/M2 | WEIGHT: 265 LBS | HEIGHT: 68 IN | DIASTOLIC BLOOD PRESSURE: 82 MMHG | HEART RATE: 75 BPM | OXYGEN SATURATION: 97 % | SYSTOLIC BLOOD PRESSURE: 124 MMHG

## 2024-09-16 DIAGNOSIS — J01.40 ACUTE NON-RECURRENT PANSINUSITIS: Primary | ICD-10-CM

## 2024-09-16 DIAGNOSIS — R10.9 ABDOMINAL CRAMPING: ICD-10-CM

## 2024-09-16 DIAGNOSIS — J02.9 SORE THROAT: ICD-10-CM

## 2024-09-16 LAB
CTP QC/QA: YES
MOLECULAR STREP A: NEGATIVE

## 2024-09-16 PROCEDURE — 3080F DIAST BP >= 90 MM HG: CPT | Mod: ,,, | Performed by: NURSE PRACTITIONER

## 2024-09-16 PROCEDURE — 87651 STREP A DNA AMP PROBE: CPT | Mod: QW,,, | Performed by: NURSE PRACTITIONER

## 2024-09-16 PROCEDURE — 1160F RVW MEDS BY RX/DR IN RCRD: CPT | Mod: ,,, | Performed by: NURSE PRACTITIONER

## 2024-09-16 PROCEDURE — 99213 OFFICE O/P EST LOW 20 MIN: CPT | Mod: 25,,, | Performed by: NURSE PRACTITIONER

## 2024-09-16 PROCEDURE — 1159F MED LIST DOCD IN RCRD: CPT | Mod: ,,, | Performed by: NURSE PRACTITIONER

## 2024-09-16 PROCEDURE — 96372 THER/PROPH/DIAG INJ SC/IM: CPT | Mod: ,,, | Performed by: NURSE PRACTITIONER

## 2024-09-16 PROCEDURE — 3008F BODY MASS INDEX DOCD: CPT | Mod: ,,, | Performed by: NURSE PRACTITIONER

## 2024-09-16 PROCEDURE — 3075F SYST BP GE 130 - 139MM HG: CPT | Mod: ,,, | Performed by: NURSE PRACTITIONER

## 2024-09-16 RX ORDER — HYOSCYAMINE SULFATE 0.12 MG/1
0.12 TABLET SUBLINGUAL EVERY 4 HOURS PRN
Qty: 30 TABLET | Refills: 0 | Status: SHIPPED | OUTPATIENT
Start: 2024-09-16

## 2024-09-16 RX ORDER — DEXAMETHASONE SODIUM PHOSPHATE 4 MG/ML
4 INJECTION, SOLUTION INTRA-ARTICULAR; INTRALESIONAL; INTRAMUSCULAR; INTRAVENOUS; SOFT TISSUE
Status: COMPLETED | OUTPATIENT
Start: 2024-09-16 | End: 2024-09-16

## 2024-09-16 RX ORDER — SUMATRIPTAN SUCCINATE 3 MG/1
INJECTION, SOLUTION SUBCUTANEOUS
COMMUNITY

## 2024-09-16 RX ORDER — AMOXICILLIN AND CLAVULANATE POTASSIUM 875; 125 MG/1; MG/1
1 TABLET, FILM COATED ORAL EVERY 12 HOURS
Qty: 20 TABLET | Refills: 0 | Status: SHIPPED | OUTPATIENT
Start: 2024-09-16

## 2024-09-16 RX ORDER — CEFTRIAXONE 1 G/1
1 INJECTION, POWDER, FOR SOLUTION INTRAMUSCULAR; INTRAVENOUS
Status: COMPLETED | OUTPATIENT
Start: 2024-09-16 | End: 2024-09-16

## 2024-09-16 RX ADMIN — CEFTRIAXONE 1 G: 1 INJECTION, POWDER, FOR SOLUTION INTRAMUSCULAR; INTRAVENOUS at 02:09

## 2024-09-16 RX ADMIN — DEXAMETHASONE SODIUM PHOSPHATE 4 MG: 4 INJECTION, SOLUTION INTRA-ARTICULAR; INTRALESIONAL; INTRAMUSCULAR; INTRAVENOUS; SOFT TISSUE at 02:09

## 2024-09-16 NOTE — PROGRESS NOTES
Valeria Melendrez NP   [facility]  [address]  [place]  [address]      PATIENT NAME: Yeny Beaver  : 1995  DATE: 24  MRN: [id number]      Billing Provider: Valeria Melendrez NP  Level of Service: HI OFFICE/OUTPT VISIT, EST, LEVL III, 20-29 MIN  Patient PCP Information       Provider PCP Type    Valeria Melendrez NP General            Reason for Visit / Chief Complaint: Sinus Problem (Patient here for what she thinks is sinus infection. Symptoms include, head fullness, stuffy nose, bilateral ear fullness and sore throat. Denies any fever, n/v. )         History of Present Illness / Problem Focused Workflow     28 year old female presents to clinic for what she thinks is sinus infection. Symptoms include, head fullness, stuffy nose, bilateral ear fullness and sore throat. Denies any fever, n/v. States she has taken Mucinex all in one at home with no relief.   She complains of lower abdominal pain occasionally. States it feels like menstrual cramps but she has had total hysterectomy.   BP elevated at today's visit but then came down. She denies headache, blurred vision, chest pain, or other s/sx of uncontrolled HTN. She does have home BP cuff and was encouraged to monitor at home.       Review of Systems     @Review of Systems   Constitutional:  Positive for activity change. Negative for unexpected weight change.   HENT:  Positive for ear pain, rhinorrhea and trouble swallowing. Negative for hearing loss.    Eyes:  Negative for discharge and visual disturbance.   Respiratory:  Negative for chest tightness and wheezing.    Cardiovascular:  Negative for chest pain and palpitations.   Gastrointestinal:  Positive for abdominal pain. Negative for blood in stool, constipation, diarrhea and vomiting.   Endocrine: Negative for polydipsia and polyuria.   Genitourinary:  Negative for difficulty urinating, dysuria, hematuria and menstrual problem.   Musculoskeletal:  Negative for  arthralgias, joint swelling and neck pain.   Neurological:  Positive for weakness and headaches.   Psychiatric/Behavioral:  Negative for confusion and dysphoric mood.        Medical / Social / Family History     Past Medical History:   Diagnosis Date    Cardiac murmur     Endometriosis     History of bee sting allergy     Migraine headache 11/13/2019    PCOS (polycystic ovarian syndrome)     Periumbilical hernia 08/20/2020    repaired by Dr. Dwyer on 10/07/2020    PONV (postoperative nausea and vomiting)     Retroflexion of uterus 07/31/2018    3rd degree       Past Surgical History:   Procedure Laterality Date    CHOLECYSTECTOMY  3-2022    Cyst removed from cervix      Cyst removed from ovary      DILATION AND CURETTAGE OF UTERUS      HERNIA REPAIR      HYSTERECTOMY      LAPAROSCOPIC CHOLECYSTECTOMY N/A 03/09/2022    Procedure: CHOLECYSTECTOMY, LAPAROSCOPIC;  Surgeon: Denny Dwyer MD;  Location: [facility];  Service: General;  Laterality: N/A;    LOOP ELECTROSURGICAL EXCISION PROCEDURE (LEEP)      Operative Laparoscopy with lysis of adhesions      RH/BSO with limited cystoscopy  09/24/2018    TONSILLECTOMY  2018       Medications and Allergies     Medications  Outpatient Medications Marked as Taking for the 9/16/24 encounter (Office Visit) with Valeria Melendrez NP   Medication Sig Dispense Refill    albuterol (PROVENTIL) 2.5 mg /3 mL (0.083 %) nebulizer solution Take 3 mLs (2.5 mg total) by nebulization every 4 to 6 hours as needed for Wheezing. 1 each 1    albuterol (PROVENTIL/VENTOLIN HFA) 90 mcg/actuation inhaler Inhale 1-2 puffs into the lungs every 6 (six) hours as needed for Wheezing. Rescue 18 g 1    BOTOX 200 unit SolR Inject 155 Units into the skin every 3 (three) months.      brexpiprazole (REXULTI) 1 mg Tab Take 1 tablet (1 mg total) by mouth once daily. 30 tablet 5    butalbital-acetaminophen-caff -40 mg Cap Take 1 capsule by mouth every 4 (four) hours as needed  (migraine).      cyanocobalamin 1,000 mcg/mL injection cyanocobalamin (vit B-12) 1,000 mcg/mL injection solution  INJECT 1 ML INTRAMUSCULARLY MONTHLY (Patient taking differently: Inject 1,000 mcg into the muscle every 30 days.) 1 mL 5    estradioL (ESTRACE) 2 MG tablet Take 1 tablet (2 mg total) by mouth 2 (two) times daily. 60 tablet 2    ferrous gluconate (FERGON) 324 MG tablet Take 1 tablet (324 mg total) by mouth daily with breakfast. 30 tablet 5    fluconazole (DIFLUCAN) 150 MG Tab Take on tablet on day one and then repeat in 3-5 days if needed. 2 tablet 2    fluticasone-salmeterol diskus inhaler 250-50 mcg Inhale 1 puff into the lungs daily as needed (wheezing). Controller 1 each 3    gabapentin (NEURONTIN) 300 MG capsule Take 1 capsule (300 mg total) by mouth 3 (three) times daily. 90 capsule 1    nabumetone (RELAFEN) 500 MG tablet Take 500 mg by mouth 2 (two) times daily as needed.      omeprazole (PRILOSEC) 40 MG capsule Take 1 capsule (40 mg total) by mouth every morning. 90 capsule 1    ondansetron (ZOFRAN-ODT) 8 MG TbDL Take 1 tablet (8 mg total) by mouth every 6 (six) hours as needed (nausea). 30 tablet 1    promethazine (PHENERGAN) 12.5 MG Tab Take 12.5 mg by mouth 4 (four) times daily as needed.      scopolamine (TRANSDERM-SCOP) 1.3-1.5 mg (1 mg over 3 days) Place 1 patch onto the skin Every 3 (three) days.      tiZANidine (ZANAFLEX) 4 MG tablet TAKE 1 OR 2 TABLETS BY MOUTH AT BEDTIME AS NEEDED FOR NECK PAIN / SPASM      triamcinolone acetonide 0.1% (KENALOG) 0.1 % ointment Apply to affected area 2 TIMES A DAY, NO LONGER THEN 1 WEEK 454 g 0    ZEMBRACE SYMTOUCH 3 mg/0.5 mL PnIj INJECT 3MG UNDER THE SKIN AT ONSET OF MIGRAINE;MAY REPEAT DOSE IF NEEDED AFTER 1 HOUR. MAX OF 4 INJECTIONS IN 24 HOURS      [DISCONTINUED] SUMAtriptan (TOSYMRA) 10 mg/actuation Spry 1 spray by Nasal route as needed (headache).  DOSE MAY BE REPEATED ONCE AFTER 1 HOUR, NOT TO EXCEED 30 MG PER DAY       Current  Facility-Administered Medications for the 9/16/24 encounter (Office Visit) with Valeria Melendrez NP   Medication Dose Route Frequency Provider Last Rate Last Admin    [COMPLETED] cefTRIAXone injection 1 g  1 g Intramuscular 1 time in Clinic/HOD Valeria Melendrez NP   1 g at 09/16/24 1422    [COMPLETED] dexAMETHasone injection 4 mg  4 mg Intramuscular 1 time in Clinic/HOD Valeria Melendrez NP   4 mg at 09/16/24 1422       Allergies  Review of patient's allergies indicates:   Allergen Reactions    Adhesive      Adhesive on hormone patch    Allergen ext-venom-honey bee Other (See Comments)    Insect venom      Bee sting       Physical Examination     Vitals:    09/16/24 1410   BP: 124/82   Pulse:    Temp:      Physical Exam  Vitals and nursing note reviewed.   Constitutional:       Appearance: Normal appearance.   HENT:      Head: Normocephalic.      Right Ear: Tympanic membrane normal.      Left Ear: Tympanic membrane normal.      Nose: Congestion and rhinorrhea present. Rhinorrhea is purulent.      Right Turbinates: Pale.      Left Turbinates: Pale.      Right Sinus: Maxillary sinus tenderness and frontal sinus tenderness present.      Left Sinus: Maxillary sinus tenderness and frontal sinus tenderness present.      Mouth/Throat:      Lips: Pink.      Mouth: Mucous membranes are moist.      Pharynx: Oropharyngeal exudate (clear post nasal drainage.) and posterior oropharyngeal erythema present.   Eyes:      Conjunctiva/sclera: Conjunctivae normal.   Cardiovascular:      Rate and Rhythm: Normal rate and regular rhythm.      Pulses: Normal pulses.      Heart sounds: Normal heart sounds.   Pulmonary:      Effort: Pulmonary effort is normal.      Breath sounds: Normal breath sounds. No wheezing or rhonchi.   Abdominal:      General: Abdomen is flat. Bowel sounds are normal. There is no distension.      Palpations: Abdomen is soft.      Tenderness: There is no abdominal tenderness.   Musculoskeletal:          General: Normal range of motion.      Cervical back: Normal range of motion.   Skin:     General: Skin is warm and dry.      Capillary Refill: Capillary refill takes less than 2 seconds.   Neurological:      General: No focal deficit present.      Mental Status: She is alert and oriented to person, place, and time. Mental status is at baseline.   Psychiatric:         Mood and Affect: Mood normal.         Behavior: Behavior normal.             Lab Results   Component Value Date    WBC 8.62 07/02/2024    HGB 11.9 (L) 07/02/2024    HCT 37.3 (L) 07/02/2024    MCV 86.3 07/02/2024     07/02/2024        CMP  Sodium   Date Value Ref Range Status   02/28/2024 139 136 - 145 mmol/L Final     Potassium   Date Value Ref Range Status   02/28/2024 4.5 3.5 - 5.1 mmol/L Final     Chloride   Date Value Ref Range Status   02/28/2024 106 98 - 107 mmol/L Final     CO2   Date Value Ref Range Status   02/28/2024 31 21 - 32 mmol/L Final     Glucose   Date Value Ref Range Status   02/28/2024 96 74 - 106 mg/dL Final     BUN   Date Value Ref Range Status   02/28/2024 9 7 - 18 mg/dL Final     Creatinine   Date Value Ref Range Status   02/28/2024 0.70 0.55 - 1.02 mg/dL Final     Calcium   Date Value Ref Range Status   02/28/2024 9.3 8.5 - 10.1 mg/dL Final     Total Protein   Date Value Ref Range Status   02/28/2024 7.7 6.4 - 8.2 g/dL Final     Albumin   Date Value Ref Range Status   02/28/2024 3.3 (L) 3.5 - 5.0 g/dL Final     Bilirubin, Total   Date Value Ref Range Status   02/28/2024 0.2 >0.0 - 1.2 mg/dL Final     Alk Phos   Date Value Ref Range Status   02/28/2024 61 37 - 98 U/L Final     AST   Date Value Ref Range Status   02/28/2024 19 15 - 37 U/L Final     ALT   Date Value Ref Range Status   02/28/2024 20 13 - 56 U/L Final     Anion Gap   Date Value Ref Range Status   02/28/2024 7 7 - 16 mmol/L Final     eGFR   Date Value Ref Range Status   02/28/2024 123 >=60 mL/min/1.73m2 Final     Procedures   Assessment and Plan (including  Health Maintenance)   :    Plan:     Problem List Items Addressed This Visit          ENT    Acute non-recurrent pansinusitis - Primary    Current Assessment & Plan     Rocephin and Decadron given IM in clinic.   Augmentin as ordered and Aprodine (which patient has at home) as needed.      Reviewed pathology of current symptoms, medication side effects/risk/benefits/directions on taking medications, and worsening or persistent symptoms that require follow up in next 2-3 days. Saline/steroid nasal sprays, antihistamine use, increase fluid intake, and multivitamin/elderberry/Zinc use were recommended. May take Tylenol or Motrin as needed for pain and/or fever. Patient was instructed to take antibiotic as directed, complete entire course to avoid antibiotic resistance, and take OTC probiotic with antibiotic to prevent GI upset. Patient verbalized understanding of treatment plan and denies any questions.         Relevant Medications    cefTRIAXone injection 1 g (Completed)    dexAMETHasone injection 4 mg (Completed)    amoxicillin-clavulanate 875-125mg (AUGMENTIN) 875-125 mg per tablet       GI    Abdominal cramping    Relevant Medications    hyoscyamine 0.125 mg Subl     Other Visit Diagnoses       Sore throat        Relevant Orders    POCT Strep A, Molecular (Completed)            Health Maintenance Topics with due status: Not Due       Topic Last Completion Date    TETANUS VACCINE 08/31/2019       Future Appointments   Date Time Provider Department Center   11/7/2024  2:40 PM Nava Anna FNP [facility]   6/17/2025  8:00 AM Valeria Melendrez NP [facility]        Health Maintenance Due   Topic Date Due    Influenza Vaccine (1) 09/01/2024    COVID-19 Vaccine (2 - 2023-24 season) 09/01/2024          Signature:  Valeria Melendrez NP  [address] [place]  [address]    Date of encounter: 9/16/24

## 2024-09-16 NOTE — ASSESSMENT & PLAN NOTE
Rocephin and Decadron given IM in clinic.   Augmentin as ordered and Aprodine (which patient has at home) as needed.      Reviewed pathology of current symptoms, medication side effects/risk/benefits/directions on taking medications, and worsening or persistent symptoms that require follow up in next 2-3 days. Saline/steroid nasal sprays, antihistamine use, increase fluid intake, and multivitamin/elderberry/Zinc use were recommended. May take Tylenol or Motrin as needed for pain and/or fever. Patient was instructed to take antibiotic as directed, complete entire course to avoid antibiotic resistance, and take OTC probiotic with antibiotic to prevent GI upset. Patient verbalized understanding of treatment plan and denies any questions.

## 2024-10-07 ENCOUNTER — OFFICE VISIT (OUTPATIENT)
Dept: FAMILY MEDICINE | Facility: CLINIC | Age: 29
End: 2024-10-07
Payer: COMMERCIAL

## 2024-10-07 VITALS
SYSTOLIC BLOOD PRESSURE: 136 MMHG | HEART RATE: 100 BPM | OXYGEN SATURATION: 99 % | DIASTOLIC BLOOD PRESSURE: 78 MMHG | RESPIRATION RATE: 18 BRPM | BODY MASS INDEX: 39.92 KG/M2 | HEIGHT: 68 IN | WEIGHT: 263.38 LBS | TEMPERATURE: 99 F

## 2024-10-07 DIAGNOSIS — J01.40 ACUTE NON-RECURRENT PANSINUSITIS: Primary | ICD-10-CM

## 2024-10-07 DIAGNOSIS — E53.8 VITAMIN B12 DEFICIENCY: ICD-10-CM

## 2024-10-07 PROCEDURE — 3078F DIAST BP <80 MM HG: CPT | Mod: ,,, | Performed by: NURSE PRACTITIONER

## 2024-10-07 PROCEDURE — 1159F MED LIST DOCD IN RCRD: CPT | Mod: ,,, | Performed by: NURSE PRACTITIONER

## 2024-10-07 PROCEDURE — 3008F BODY MASS INDEX DOCD: CPT | Mod: ,,, | Performed by: NURSE PRACTITIONER

## 2024-10-07 PROCEDURE — 99214 OFFICE O/P EST MOD 30 MIN: CPT | Mod: 25,,, | Performed by: NURSE PRACTITIONER

## 2024-10-07 PROCEDURE — 96372 THER/PROPH/DIAG INJ SC/IM: CPT | Mod: ,,, | Performed by: NURSE PRACTITIONER

## 2024-10-07 PROCEDURE — 3075F SYST BP GE 130 - 139MM HG: CPT | Mod: ,,, | Performed by: NURSE PRACTITIONER

## 2024-10-07 PROCEDURE — 1160F RVW MEDS BY RX/DR IN RCRD: CPT | Mod: ,,, | Performed by: NURSE PRACTITIONER

## 2024-10-07 RX ORDER — PROMETHAZINE HYDROCHLORIDE AND DEXTROMETHORPHAN HYDROBROMIDE 6.25; 15 MG/5ML; MG/5ML
5 SYRUP ORAL EVERY 4 HOURS PRN
Qty: 240 ML | Refills: 0 | Status: SHIPPED | OUTPATIENT
Start: 2024-10-07 | End: 2024-10-17

## 2024-10-07 RX ORDER — CEFTRIAXONE 1 G/1
1 INJECTION, POWDER, FOR SOLUTION INTRAMUSCULAR; INTRAVENOUS
Status: COMPLETED | OUTPATIENT
Start: 2024-10-07 | End: 2024-10-07

## 2024-10-07 RX ORDER — SEMAGLUTIDE 0.25 MG/.5ML
0.25 INJECTION, SOLUTION SUBCUTANEOUS
Qty: 2 ML | Refills: 0 | Status: SHIPPED | OUTPATIENT
Start: 2024-10-07

## 2024-10-07 RX ORDER — AZITHROMYCIN 500 MG/1
500 TABLET, FILM COATED ORAL DAILY
Qty: 5 TABLET | Refills: 0 | Status: SHIPPED | OUTPATIENT
Start: 2024-10-07

## 2024-10-07 RX ORDER — BENZONATATE 100 MG/1
100 CAPSULE ORAL 3 TIMES DAILY PRN
Qty: 30 CAPSULE | Refills: 0 | Status: SHIPPED | OUTPATIENT
Start: 2024-10-07 | End: 2024-10-17

## 2024-10-07 RX ORDER — CYANOCOBALAMIN 1000 UG/ML
1000 INJECTION, SOLUTION INTRAMUSCULAR; SUBCUTANEOUS
Qty: 1 ML | Refills: 5 | Status: SHIPPED | OUTPATIENT
Start: 2024-10-07

## 2024-10-07 RX ADMIN — CEFTRIAXONE 1 G: 1 INJECTION, POWDER, FOR SOLUTION INTRAMUSCULAR; INTRAVENOUS at 02:10

## 2024-10-07 NOTE — PROGRESS NOTES
Valeria Melendrez NP   [place] [address]  [place]  [address]      PATIENT NAME: Yeny Beaver  : 1995  DATE: 10/7/24  MRN: [id number]      Billing Provider: Valeria Melendrez NP  Level of Service: AK OFFICE/OUTPT VISIT, EST, LEVL IV, 30-39 MIN  Patient PCP Information       Provider PCP Type    Valeria Melendrez NP General            Reason for Visit / Chief Complaint: Cough (X 1 week. State that she does cough stuff up but hasn't been looking to see what color it is. States did have fever when she started the cough but has not had any fever in last 3-4 days) and Sore Throat (X 1 week as well.)         History of Present Illness / Problem Focused Workflow     28 year old female presents to clinic with complaints of cough and sore throat x 1 week.   Cough: X 1 week. State that she does cough stuff up but hasn't been looking to see what color it is. States did have fever when she started the cough but has not had any fever in last 3-4 days  Sore Throat: X 1 week as well.  She has taken Mucinex, Robitussin DM at home with some relief.             Review of Systems     @Review of Systems   Constitutional:  Positive for fever. Negative for activity change, appetite change and fatigue.   HENT:  Positive for nasal congestion, rhinorrhea, sinus pressure/congestion and sore throat. Negative for ear pain.    Eyes:  Negative for pain, redness, visual disturbance and eye dryness.   Respiratory:  Positive for cough. Negative for shortness of breath.    Cardiovascular:  Negative for chest pain and leg swelling.   Gastrointestinal:  Negative for abdominal distention, abdominal pain, constipation and diarrhea.   Endocrine: Negative for cold intolerance, heat intolerance and polyuria.   Genitourinary:  Negative for bladder incontinence, dysuria, frequency and urgency.   Musculoskeletal:  Negative for arthralgias, gait problem and myalgias.   Integumentary:  Negative for color change, rash and  wound.   Allergic/Immunologic: Negative for environmental allergies and food allergies.   Neurological:  Positive for headaches. Negative for dizziness, weakness and light-headedness.   Psychiatric/Behavioral:  Negative for behavioral problems and sleep disturbance.        Medical / Social / Family History     Past Medical History:   Diagnosis Date    Cardiac murmur     Endometriosis     History of bee sting allergy     Migraine headache 11/13/2019    PCOS (polycystic ovarian syndrome)     Periumbilical hernia 08/20/2020    repaired by Dr. Dwyer on 10/07/2020    PONV (postoperative nausea and vomiting)     Retroflexion of uterus 07/31/2018    3rd degree       Past Surgical History:   Procedure Laterality Date    CHOLECYSTECTOMY  3-2022    Cyst removed from cervix      Cyst removed from ovary      DILATION AND CURETTAGE OF UTERUS      HERNIA REPAIR      HYSTERECTOMY      LAPAROSCOPIC CHOLECYSTECTOMY N/A 03/09/2022    Procedure: CHOLECYSTECTOMY, LAPAROSCOPIC;  Surgeon: Denny Dwyer MD;  Location: [facility];  Service: General;  Laterality: N/A;    LOOP ELECTROSURGICAL EXCISION PROCEDURE (LEEP)      Operative Laparoscopy with lysis of adhesions      RH/BSO with limited cystoscopy  09/24/2018    TONSILLECTOMY  2018       Medications and Allergies     Medications  Outpatient Medications Marked as Taking for the 10/7/24 encounter (Office Visit) with Valeria Melendrez NP   Medication Sig Dispense Refill    albuterol (PROVENTIL) 2.5 mg /3 mL (0.083 %) nebulizer solution Take 3 mLs (2.5 mg total) by nebulization every 4 to 6 hours as needed for Wheezing. 1 each 1    albuterol (PROVENTIL/VENTOLIN HFA) 90 mcg/actuation inhaler Inhale 1-2 puffs into the lungs every 6 (six) hours as needed for Wheezing. Rescue 18 g 1    BOTOX 200 unit SolR Inject 155 Units into the skin every 3 (three) months.      brexpiprazole (REXULTI) 1 mg Tab Take 1 tablet (1 mg total) by mouth once daily. 30 tablet 5    butalbital-acetaminophen-caff  -40 mg Cap Take 1 capsule by mouth every 4 (four) hours as needed (migraine).      estradioL (ESTRACE) 2 MG tablet Take 1 tablet (2 mg total) by mouth 2 (two) times daily. 60 tablet 2    fluticasone-salmeterol diskus inhaler 250-50 mcg Inhale 1 puff into the lungs daily as needed (wheezing). Controller 1 each 3    gabapentin (NEURONTIN) 300 MG capsule Take 1 capsule (300 mg total) by mouth 3 (three) times daily. 90 capsule 1    hyoscyamine 0.125 mg Subl Place 1 tablet (0.125 mg total) under the tongue every 4 (four) hours as needed (stomach cramp). 30 tablet 0    nabumetone (RELAFEN) 500 MG tablet Take 500 mg by mouth 2 (two) times daily as needed.      omeprazole (PRILOSEC) 40 MG capsule Take 1 capsule (40 mg total) by mouth every morning. 90 capsule 1    ondansetron (ZOFRAN-ODT) 8 MG TbDL Take 1 tablet (8 mg total) by mouth every 6 (six) hours as needed (nausea). 30 tablet 1    promethazine (PHENERGAN) 12.5 MG Tab Take 12.5 mg by mouth 4 (four) times daily as needed.      scopolamine (TRANSDERM-SCOP) 1.3-1.5 mg (1 mg over 3 days) Place 1 patch onto the skin Every 3 (three) days.      tiZANidine (ZANAFLEX) 4 MG tablet TAKE 1 OR 2 TABLETS BY MOUTH AT BEDTIME AS NEEDED FOR NECK PAIN / SPASM      triamcinolone acetonide 0.1% (KENALOG) 0.1 % ointment Apply to affected area 2 TIMES A DAY, NO LONGER THEN 1 WEEK 454 g 0    ZEMBRACE SYMTOUCH 3 mg/0.5 mL PnIj INJECT 3MG UNDER THE SKIN AT ONSET OF MIGRAINE;MAY REPEAT DOSE IF NEEDED AFTER 1 HOUR. MAX OF 4 INJECTIONS IN 24 HOURS      [DISCONTINUED] cyanocobalamin 1,000 mcg/mL injection cyanocobalamin (vit B-12) 1,000 mcg/mL injection solution  INJECT 1 ML INTRAMUSCULARLY MONTHLY (Patient taking differently: Inject 1,000 mcg into the muscle every 30 days.) 1 mL 5       Allergies  Review of patient's allergies indicates:   Allergen Reactions    Adhesive      Adhesive on hormone patch    Allergen ext-venom-honey bee Other (See Comments)    Insect venom      Bee sting        Physical Examination     Vitals:    10/07/24 1346   BP: 136/78   Pulse: 100   Resp: 18   Temp: 98.7 °F (37.1 °C)     Physical Exam  Vitals and nursing note reviewed.   Constitutional:       Appearance: Normal appearance.   HENT:      Head: Normocephalic.      Right Ear: Tympanic membrane normal.      Left Ear: Tympanic membrane normal.      Nose: Congestion and rhinorrhea present. Rhinorrhea is purulent.      Right Turbinates: Pale.      Left Turbinates: Pale.      Right Sinus: Maxillary sinus tenderness and frontal sinus tenderness present.      Left Sinus: Maxillary sinus tenderness and frontal sinus tenderness present.      Mouth/Throat:      Lips: Pink.      Mouth: Mucous membranes are moist.      Pharynx: Oropharyngeal exudate (clear post nasal drainage.) and posterior oropharyngeal erythema present.   Eyes:      Conjunctiva/sclera: Conjunctivae normal.   Cardiovascular:      Rate and Rhythm: Normal rate and regular rhythm.      Pulses: Normal pulses.      Heart sounds: Normal heart sounds.   Pulmonary:      Effort: Pulmonary effort is normal.      Breath sounds: Normal breath sounds. No wheezing or rhonchi.   Abdominal:      General: Abdomen is flat. Bowel sounds are normal. There is no distension.      Palpations: Abdomen is soft.      Tenderness: There is no abdominal tenderness.   Musculoskeletal:         General: Normal range of motion.      Cervical back: Normal range of motion.   Skin:     General: Skin is warm and dry.      Capillary Refill: Capillary refill takes less than 2 seconds.   Neurological:      General: No focal deficit present.      Mental Status: She is alert and oriented to person, place, and time. Mental status is at baseline.   Psychiatric:         Mood and Affect: Mood normal.         Behavior: Behavior normal.               Lab Results   Component Value Date    WBC 8.62 07/02/2024    HGB 11.9 (L) 07/02/2024    HCT 37.3 (L) 07/02/2024    MCV 86.3 07/02/2024     07/02/2024         CMP  Sodium   Date Value Ref Range Status   02/28/2024 139 136 - 145 mmol/L Final     Potassium   Date Value Ref Range Status   02/28/2024 4.5 3.5 - 5.1 mmol/L Final     Chloride   Date Value Ref Range Status   02/28/2024 106 98 - 107 mmol/L Final     CO2   Date Value Ref Range Status   02/28/2024 31 21 - 32 mmol/L Final     Glucose   Date Value Ref Range Status   02/28/2024 96 74 - 106 mg/dL Final     BUN   Date Value Ref Range Status   02/28/2024 9 7 - 18 mg/dL Final     Creatinine   Date Value Ref Range Status   02/28/2024 0.70 0.55 - 1.02 mg/dL Final     Calcium   Date Value Ref Range Status   02/28/2024 9.3 8.5 - 10.1 mg/dL Final     Total Protein   Date Value Ref Range Status   02/28/2024 7.7 6.4 - 8.2 g/dL Final     Albumin   Date Value Ref Range Status   02/28/2024 3.3 (L) 3.5 - 5.0 g/dL Final     Bilirubin, Total   Date Value Ref Range Status   02/28/2024 0.2 >0.0 - 1.2 mg/dL Final     Alk Phos   Date Value Ref Range Status   02/28/2024 61 37 - 98 U/L Final     AST   Date Value Ref Range Status   02/28/2024 19 15 - 37 U/L Final     ALT   Date Value Ref Range Status   02/28/2024 20 13 - 56 U/L Final     Anion Gap   Date Value Ref Range Status   02/28/2024 7 7 - 16 mmol/L Final     eGFR   Date Value Ref Range Status   02/28/2024 123 >=60 mL/min/1.73m2 Final     Procedures   Assessment and Plan (including Health Maintenance)   :    Plan:     Problem List Items Addressed This Visit          ENT    Acute non-recurrent pansinusitis - Primary    Current Assessment & Plan     Rocephin given IM in clinic. No steroids as patient has had a couple doses of steroids recently.   Zithromax as ordered and Tessalon Perles or Promethazine DM at night as needed for cough.       Reviewed pathology of current symptoms, medication side effects/risk/benefits/directions on taking medications, and worsening or persistent symptoms that require follow up in next 2-3 days. Saline/steroid nasal sprays, antihistamine use,  increase fluid intake, and multivitamin/elderberry/Zinc use were recommended. May take Tylenol or Motrin as needed for pain and/or fever. Patient was instructed to take antibiotic as directed, complete entire course to avoid antibiotic resistance, and take OTC probiotic with antibiotic to prevent GI upset. Patient verbalized understanding of treatment plan and denies any questions.         Relevant Medications    azithromycin (ZITHROMAX) 500 MG tablet    benzonatate (TESSALON) 100 MG capsule    promethazine-dextromethorphan (PROMETHAZINE-DM) 6.25-15 mg/5 mL Syrp       Endocrine    Vitamin B12 deficiency    Current Assessment & Plan     Continue monthly B12 injections. Follow up in 3 months or as needed.          Relevant Medications    cyanocobalamin 1,000 mcg/mL injection    BMI 40.0-44.9, adult    Current Assessment & Plan     Patient reports she has tried diet and exercise and has been unsuccessful at losing weight. She would like to try Wegovy to see if it will help curb her appetite. Discussed medication use, side effects, and how to use. Instructed patient this is only a tool to help lose weight in addition to diet and exercise. Encouraged patient to use apps such as My Fitness Pal, GOLO, or [employer]. Instructed patient to follow up in one month for weigh in and to discuss effectiveness.  I            Health Maintenance Topics with due status: Not Due       Topic Last Completion Date    TETANUS VACCINE 08/31/2019    RSV Vaccine (Age 60+ and Pregnant patients) Not Due       Future Appointments   Date Time Provider Department Center   11/7/2024  2:40 PM Nava Anna FNP [facility]   6/17/2025  8:00 AM Valeria Melendrez NP [facility]        Health Maintenance Due   Topic Date Due    Influenza Vaccine (1) 09/01/2024    COVID-19 Vaccine (2 - 2024-25 season) 09/01/2024          Signature:  Valeria Melendrez NP  [facility]  [address] [place]  [address]    Date of  encounter: 10/7/24

## 2024-10-08 DIAGNOSIS — K21.9 GASTROESOPHAGEAL REFLUX DISEASE WITHOUT ESOPHAGITIS: ICD-10-CM

## 2024-10-08 RX ORDER — OMEPRAZOLE 40 MG/1
40 CAPSULE, DELAYED RELEASE ORAL EVERY MORNING
Qty: 90 CAPSULE | Refills: 1 | OUTPATIENT
Start: 2024-10-08

## 2024-10-09 NOTE — ASSESSMENT & PLAN NOTE
Rocephin given IM in clinic. No steroids as patient has had a couple doses of steroids recently.   Zithromax as ordered and Tessalon Perles or Promethazine DM at night as needed for cough.       Reviewed pathology of current symptoms, medication side effects/risk/benefits/directions on taking medications, and worsening or persistent symptoms that require follow up in next 2-3 days. Saline/steroid nasal sprays, antihistamine use, increase fluid intake, and multivitamin/elderberry/Zinc use were recommended. May take Tylenol or Motrin as needed for pain and/or fever. Patient was instructed to take antibiotic as directed, complete entire course to avoid antibiotic resistance, and take OTC probiotic with antibiotic to prevent GI upset. Patient verbalized understanding of treatment plan and denies any questions.

## 2024-10-09 NOTE — ASSESSMENT & PLAN NOTE
Patient reports she has tried diet and exercise and has been unsuccessful at losing weight. She would like to try Wegovy to see if it will help curb her appetite. Discussed medication use, side effects, and how to use. Instructed patient this is only a tool to help lose weight in addition to diet and exercise. Encouraged patient to use apps such as My Fitness Pal, GOLO, or [employer]ilburn[employer]. Instructed patient to follow up in one month for weigh in and to discuss effectiveness.  I

## 2024-10-16 DIAGNOSIS — R10.9 ABDOMINAL CRAMPING: ICD-10-CM

## 2024-10-16 RX ORDER — HYOSCYAMINE SULFATE 0.12 MG/1
0.12 TABLET SUBLINGUAL EVERY 4 HOURS PRN
Qty: 30 TABLET | Refills: 0 | Status: SHIPPED | OUTPATIENT
Start: 2024-10-16

## 2024-10-30 RX ORDER — GABAPENTIN 300 MG/1
CAPSULE ORAL 3 TIMES DAILY
Qty: 90 CAPSULE | Refills: 3 | Status: SHIPPED | OUTPATIENT
Start: 2024-10-30

## 2024-11-04 DIAGNOSIS — K21.9 GASTROESOPHAGEAL REFLUX DISEASE WITHOUT ESOPHAGITIS: ICD-10-CM

## 2024-11-04 RX ORDER — OMEPRAZOLE 40 MG/1
40 CAPSULE, DELAYED RELEASE ORAL EVERY MORNING
Qty: 90 CAPSULE | Refills: 0 | Status: SHIPPED | OUTPATIENT
Start: 2024-11-04 | End: 2024-11-06 | Stop reason: SDUPTHER

## 2024-11-06 DIAGNOSIS — K21.9 GASTROESOPHAGEAL REFLUX DISEASE WITHOUT ESOPHAGITIS: ICD-10-CM

## 2024-11-06 RX ORDER — OMEPRAZOLE 40 MG/1
40 CAPSULE, DELAYED RELEASE ORAL EVERY MORNING
Qty: 90 CAPSULE | Refills: 0 | Status: SHIPPED | OUTPATIENT
Start: 2024-11-06 | End: 2025-11-06

## 2024-11-06 NOTE — TELEPHONE ENCOUNTER
[facility] [place] [facility] requested for omeprazole 90 day refill her insurance will only cover prescription this way. [facility] phones not working patient states. I did call pharmacy about refill sent in 11/4/2024 from [facility] and was told they did not have the prescription we sent.

## 2024-12-03 RX ORDER — EPINEPHRINE 0.3 MG/.3ML
1 INJECTION SUBCUTANEOUS ONCE
Qty: 0.3 ML | Refills: 0 | Status: SHIPPED | OUTPATIENT
Start: 2024-12-03 | End: 2024-12-03

## 2024-12-03 NOTE — ASSESSMENT & PLAN NOTE
Most likely contributing to dizziness. Medrol Dose pack as ordered. Discussed nasal sprays and she states she does not want to use any sort of nasal spray. Recommended OTC decongestant use as needed. Sit or lie down immediately when you feel dizzy. Take time going from lying, to sitting, to standing. Drink enough fluids, eat a healthy diet, get enough sleep and avoid stress.    MED

## 2024-12-12 ENCOUNTER — OFFICE VISIT (OUTPATIENT)
Dept: FAMILY MEDICINE | Facility: CLINIC | Age: 29
End: 2024-12-12
Payer: COMMERCIAL

## 2024-12-12 VITALS
SYSTOLIC BLOOD PRESSURE: 124 MMHG | HEART RATE: 86 BPM | DIASTOLIC BLOOD PRESSURE: 80 MMHG | OXYGEN SATURATION: 96 % | HEIGHT: 68 IN | BODY MASS INDEX: 39.71 KG/M2 | RESPIRATION RATE: 19 BRPM | WEIGHT: 262 LBS | TEMPERATURE: 98 F

## 2024-12-12 DIAGNOSIS — R05.1 ACUTE COUGH: ICD-10-CM

## 2024-12-12 DIAGNOSIS — J01.40 ACUTE NON-RECURRENT PANSINUSITIS: Primary | ICD-10-CM

## 2024-12-12 DIAGNOSIS — R09.81 NASAL CONGESTION: ICD-10-CM

## 2024-12-12 DIAGNOSIS — R52 BODY ACHES: ICD-10-CM

## 2024-12-12 DIAGNOSIS — J45.20 MILD INTERMITTENT ASTHMA, UNSPECIFIED WHETHER COMPLICATED: ICD-10-CM

## 2024-12-12 LAB
CTP QC/QA: YES
CTP QC/QA: YES
MOLECULAR STREP A: NEGATIVE
SARS-COV-2 RDRP RESP QL NAA+PROBE: NEGATIVE

## 2024-12-12 RX ORDER — FLUTICASONE PROPIONATE AND SALMETEROL 250; 50 UG/1; UG/1
1 POWDER RESPIRATORY (INHALATION) DAILY PRN
Qty: 1 EACH | Refills: 3 | Status: SHIPPED | OUTPATIENT
Start: 2024-12-12

## 2024-12-12 RX ORDER — ALBUTEROL SULFATE 0.83 MG/ML
2.5 SOLUTION RESPIRATORY (INHALATION)
Qty: 1 EACH | Refills: 1 | Status: SHIPPED | OUTPATIENT
Start: 2024-12-12

## 2024-12-12 RX ORDER — PROMETHAZINE HYDROCHLORIDE AND DEXTROMETHORPHAN HYDROBROMIDE 6.25; 15 MG/5ML; MG/5ML
5 SYRUP ORAL EVERY 4 HOURS PRN
Qty: 118 ML | Refills: 0 | Status: SHIPPED | OUTPATIENT
Start: 2024-12-12 | End: 2024-12-22

## 2024-12-12 RX ORDER — ALBUTEROL SULFATE 90 UG/1
1-2 INHALANT RESPIRATORY (INHALATION) EVERY 6 HOURS PRN
Qty: 18 G | Refills: 1 | Status: SHIPPED | OUTPATIENT
Start: 2024-12-12

## 2024-12-12 RX ORDER — CEFTRIAXONE 1 G/1
1 INJECTION, POWDER, FOR SOLUTION INTRAMUSCULAR; INTRAVENOUS
Status: COMPLETED | OUTPATIENT
Start: 2024-12-12 | End: 2024-12-12

## 2024-12-12 RX ORDER — AZITHROMYCIN 500 MG/1
500 TABLET, FILM COATED ORAL DAILY
Qty: 5 TABLET | Refills: 0 | Status: SHIPPED | OUTPATIENT
Start: 2024-12-12

## 2024-12-12 RX ORDER — DEXAMETHASONE SODIUM PHOSPHATE 4 MG/ML
4 INJECTION, SOLUTION INTRA-ARTICULAR; INTRALESIONAL; INTRAMUSCULAR; INTRAVENOUS; SOFT TISSUE
Status: COMPLETED | OUTPATIENT
Start: 2024-12-12 | End: 2024-12-12

## 2024-12-12 RX ADMIN — DEXAMETHASONE SODIUM PHOSPHATE 4 MG: 4 INJECTION, SOLUTION INTRA-ARTICULAR; INTRALESIONAL; INTRAMUSCULAR; INTRAVENOUS; SOFT TISSUE at 12:12

## 2024-12-12 RX ADMIN — CEFTRIAXONE 1 G: 1 INJECTION, POWDER, FOR SOLUTION INTRAMUSCULAR; INTRAVENOUS at 12:12

## 2024-12-12 NOTE — PROGRESS NOTES
Valeria Melendrez NP   [facility]  [address] [address] [address]  [phone]      PATIENT NAME: Yeny Beaver  : 1995  DATE: 24  MRN: [id number]      Billing Provider: Valeria Melendrez NP  Level of Service: [place] OFFICE/OUTPT VISIT, EST, LEVL III, 20-29 MIN  Patient PCP Information       Provider PCP Type    Valeria Melendrez NP General                Medications and Allergies     Medications  Outpatient Medications Marked as Taking for the 24 encounter (Office Visit) with Valeria Melendrez NP   Medication Sig Dispense Refill    BOTOX 200 unit SolR Inject 155 Units into the skin every 3 (three) months.      brexpiprazole (REXULTI) 1 mg Tab Take 1 tablet (1 mg total) by mouth once daily. 30 tablet 5    butalbital-acetaminophen-caff -40 mg Cap Take 1 capsule by mouth every 4 (four) hours as needed (migraine).      cyanocobalamin 1,000 mcg/mL injection Inject 1 mL (1,000 mcg total) into the muscle every 30 days. 1 mL 5    estradioL (ESTRACE) 2 MG tablet Take 1 tablet (2 mg total) by mouth 2 (two) times daily. 60 tablet 2    gabapentin (NEURONTIN) 300 MG capsule TAKE ONE TABLET BY MOUTH THREE times DAILY 90 capsule 3    hyoscyamine 0.125 mg Subl Place 1 tablet (0.125 mg total) under the tongue every 4 (four) hours as needed (stomach cramp). 30 tablet 0    omeprazole (PRILOSEC) 40 MG capsule Take 1 capsule (40 mg total) by mouth every morning. 90 capsule 0    ondansetron (ZOFRAN-ODT) 8 MG TbDL Take 1 tablet (8 mg total) by mouth every 6 (six) hours as needed (nausea). 30 tablet 1    promethazine (PHENERGAN) 12.5 MG Tab Take 12.5 mg by mouth 4 (four) times daily as needed.      scopolamine (TRANSDERM-SCOP) 1.3-1.5 mg (1 mg over 3 days) Place 1 patch onto the skin Every 3 (three) days.      tiZANidine (ZANAFLEX) 4 MG tablet TAKE 1 OR 2 TABLETS BY MOUTH AT BEDTIME AS NEEDED FOR NECK PAIN / SPASM      triamcinolone acetonide 0.1% (KENALOG) 0.1 %  ointment Apply to affected area 2 TIMES A DAY, NO LONGER THEN 1 WEEK 454 g 0    [DISCONTINUED] albuterol (PROVENTIL) 2.5 mg /3 mL (0.083 %) nebulizer solution Take 3 mLs (2.5 mg total) by nebulization every 4 to 6 hours as needed for Wheezing. 1 each 1    [DISCONTINUED] albuterol (PROVENTIL/VENTOLIN HFA) 90 mcg/actuation inhaler Inhale 1-2 puffs into the lungs every 6 (six) hours as needed for Wheezing. Rescue 18 g 1    [DISCONTINUED] fluticasone-salmeterol diskus inhaler 250-50 mcg Inhale 1 puff into the lungs daily as needed (wheezing). Controller 1 each 3     Current Facility-Administered Medications for the 24 encounter (Office Visit) with Valeria Melendrez NP   Medication Dose Route Frequency Provider Last Rate Last Admin    [COMPLETED] cefTRIAXone injection 1 g  1 g Intramuscular 1 time in Clinic/HOD Valeria Melendrez NP   1 g at 24 1240    [COMPLETED] dexAMETHasone injection 4 mg  4 mg Intramuscular 1 time in Clinic/HOD Valeria Melendrez NP   4 mg at 24 1240       Allergies  Review of patient's allergies indicates:   Allergen Reactions    Adhesive      Adhesive on hormone patch    Allergen ext-venom-honey bee Other (See Comments)    Insect venom      Bee sting       History of Present Illness    CHIEF COMPLAINT:  Patient presents today for sinus congestion and pressure.    CURRENT SYMPTOMS:  She reports experiencing sinus congestion and pressure, primarily on the right side, which began approximately one week ago. Her cough has eased up a little, with minimal productive cough producing small amounts of sputum insufficient to expectorate. She reports a history of sore throat but denies any current throat pain or discomfort.    MEDICATIONS:  She has been taking Ivy Bonnie sinus and both regular and nighttime sinus formulations of Mucinex at home for symptom management. She reports losing her Albuterol inhaler and states that her nebulizer medication has . Requesting refills on  these.     ROS:  General: -fever, -chills, -fatigue, -weight gain, -weight loss  Eyes: -vision changes, -redness, -discharge  ENT: -ear pain, +nasal congestion, +sore throat, +sinus pressure  Cardiovascular: -chest pain, -palpitations, -lower extremity edema  Respiratory: +cough, -shortness of breath  Gastrointestinal: -abdominal pain, -nausea, -vomiting, -diarrhea, -constipation, -blood in stool  Genitourinary: -dysuria, -hematuria, -frequency  Musculoskeletal: -joint pain, -muscle pain  Skin: -rash, -lesion  Neurological: -headache, -dizziness, -numbness, -tingling  Psychiatric: -anxiety, -depression, -sleep difficulty          Physical Exam    General: No acute distress. Well-developed. Well-nourished.  Eyes: EOMI. Sclerae anicteric.  HENT: Normocephalic. Atraumatic. Nares patent. Moist oral mucosa. Swelling on right side of face. Tenderness in right frontal and maxillary sinuses. Mild pharyngeal erythema.  Ears: Bilateral TMs clear. Bilateral EACs clear.  Cardiovascular: Regular rate. Regular rhythm. No murmurs. No rubs. No gallops. Normal S1, S2.  Respiratory: Normal respiratory effort. No rales. No rhonchi. Mild wheezing in upper lungs.  Abdomen: Soft. Non-tender. Non-distended. Normoactive bowel sounds.  Musculoskeletal: No  obvious deformity.  Extremities: No lower extremity edema.  Neurological: Alert & oriented x3. No slurred speech. Normal gait.  Psychiatric: Normal mood. Normal affect. Good insight. Good judgment.  Skin: Warm. Dry. No rash.  Neck: Cervical lymphadenopathy.          Assessment & Plan    IMPRESSION:  - Assessed patient with sinus congestion and pressure  - Determined need for antibiotic and steroid treatment due to persistence of symptoms  - Considered patient's history and preference for injectable medications  - Reviewed patient's medication allergies and previous responses to treatments    SINUS INFECTION:  - Diagnosed sinus infection and determined that antibiotics are necessary for  treatment.  - Prescribed zithromax (azithromycin) 500mg  - Administered Rocephin and Decadron injection in office   - Discussed the importance of hydration in thinning mucus.  - Advised continuation of OTC medications (Ivy-Lawrence sinus, Mucinex).    ASTHMA:  - Refilled Advair for long-term asthma control.  - Refilled albuterol inhaler for quick relief of asthma symptoms.  - Refilled albuterol for nebulizer use as needed.  - Patient reports coughing and chest tightness, indicating potential asthma symptoms.  - Detected slight wheezing in the upper respiratory tract during exam.      FOLLOW UP:  - Follow up as needed.           Health Maintenance Due   Topic Date Due    Influenza Vaccine (1) 09/01/2024    COVID-19 Vaccine (2 - 2024-25 season) 09/01/2024       Problem List Items Addressed This Visit          ENT    Acute non-recurrent pansinusitis - Primary       Pulmonary    Mild intermittent asthma    Relevant Medications    albuterol (PROVENTIL/VENTOLIN HFA) 90 mcg/actuation inhaler    fluticasone-salmeterol diskus inhaler 250-50 mcg     Other Visit Diagnoses       Acute cough        Relevant Orders    POCT COVID-19 Rapid Screening (Completed)    Nasal congestion        Relevant Orders    POCT COVID-19 Rapid Screening (Completed)    POCT Strep A, Molecular (Completed)    Body aches                Health Maintenance Topics with due status: Not Due       Topic Last Completion Date    TETANUS VACCINE 08/31/2019    RSV Vaccine (Age 60+ and Pregnant patients) Not Due       Future Appointments   Date Time Provider Department Center   6/17/2025  8:00 AM Valeria Melendrez, NP [facility]        This note was generated with the assistance of ambient listening technology. Verbal consent was obtained by the patient and accompanying visitor(s) for the recording of patient appointment to facilitate this note. I attest to having reviewed and edited the generated note for accuracy, though some syntax or spelling  errors may persist. Please contact the author of this note for any clarification.     Signature:  Valeria Melendrez NP  [facility] [address] [place] [address]  [phone]    Date of encounter: 12/12/24

## 2025-01-22 RX ORDER — BREXPIPRAZOLE 1 MG/1
1 TABLET ORAL
Qty: 90 TABLET | Refills: 0 | Status: SHIPPED | OUTPATIENT
Start: 2025-01-22 | End: 2025-01-30 | Stop reason: SDUPTHER

## 2025-01-30 ENCOUNTER — OFFICE VISIT (OUTPATIENT)
Dept: FAMILY MEDICINE | Facility: CLINIC | Age: 30
End: 2025-01-30
Payer: COMMERCIAL

## 2025-01-30 VITALS
SYSTOLIC BLOOD PRESSURE: 107 MMHG | HEIGHT: 68 IN | WEIGHT: 260.19 LBS | RESPIRATION RATE: 19 BRPM | BODY MASS INDEX: 39.43 KG/M2 | TEMPERATURE: 99 F | OXYGEN SATURATION: 97 % | HEART RATE: 100 BPM | DIASTOLIC BLOOD PRESSURE: 78 MMHG

## 2025-01-30 DIAGNOSIS — K21.9 GASTROESOPHAGEAL REFLUX DISEASE WITHOUT ESOPHAGITIS: ICD-10-CM

## 2025-01-30 DIAGNOSIS — F33.0 MILD EPISODE OF RECURRENT MAJOR DEPRESSIVE DISORDER: Primary | ICD-10-CM

## 2025-01-30 PROCEDURE — 3078F DIAST BP <80 MM HG: CPT | Mod: ,,, | Performed by: NURSE PRACTITIONER

## 2025-01-30 PROCEDURE — 99213 OFFICE O/P EST LOW 20 MIN: CPT | Mod: ,,, | Performed by: NURSE PRACTITIONER

## 2025-01-30 PROCEDURE — 3074F SYST BP LT 130 MM HG: CPT | Mod: ,,, | Performed by: NURSE PRACTITIONER

## 2025-01-30 PROCEDURE — 1160F RVW MEDS BY RX/DR IN RCRD: CPT | Mod: ,,, | Performed by: NURSE PRACTITIONER

## 2025-01-30 PROCEDURE — 1159F MED LIST DOCD IN RCRD: CPT | Mod: ,,, | Performed by: NURSE PRACTITIONER

## 2025-01-30 PROCEDURE — 3008F BODY MASS INDEX DOCD: CPT | Mod: ,,, | Performed by: NURSE PRACTITIONER

## 2025-01-30 RX ORDER — OMEPRAZOLE 40 MG/1
40 CAPSULE, DELAYED RELEASE ORAL EVERY MORNING
Qty: 90 CAPSULE | Refills: 1 | Status: SHIPPED | OUTPATIENT
Start: 2025-01-30 | End: 2026-01-30

## 2025-01-30 RX ORDER — DESVENLAFAXINE 50 MG/1
50 TABLET, FILM COATED, EXTENDED RELEASE ORAL DAILY
Qty: 90 TABLET | Refills: 1 | Status: SHIPPED | OUTPATIENT
Start: 2025-01-30 | End: 2026-01-30

## 2025-01-30 RX ORDER — BREXPIPRAZOLE 1 MG/1
1 TABLET ORAL DAILY
Qty: 90 TABLET | Refills: 1 | Status: SHIPPED | OUTPATIENT
Start: 2025-01-30

## 2025-01-30 RX ORDER — ACETAZOLAMIDE 125 MG/1
125 TABLET ORAL 2 TIMES DAILY
COMMUNITY
Start: 2025-01-23

## 2025-01-30 RX ORDER — METAXALONE 800 MG/1
800 TABLET ORAL 2 TIMES DAILY PRN
COMMUNITY
Start: 2024-12-13

## 2025-01-30 RX ORDER — HYDROCHLOROTHIAZIDE 25 MG/1
25 TABLET ORAL DAILY
COMMUNITY
Start: 2025-01-23

## 2025-02-03 NOTE — TELEPHONE ENCOUNTER
Patient called stating Provider and patient had talked about changing Rexulti to 2mg. Patient states 1 mg was sent in to pharmacy.

## 2025-02-12 NOTE — ASSESSMENT & PLAN NOTE
Will increase her rexulti and continue Pristiq. Follow up in 1 month to discuss effectiveness. Denies suicidal or homicidal ideations.

## 2025-02-12 NOTE — PROGRESS NOTES
Valeria Melendrez NP   [facility]  [address]  [place]  [address]      PATIENT NAME: Yeny Beaver  : 1995  DATE: 25  MRN: [id number]      Billing Provider: Valeria Melendrez NP  Level of Service: [place] OFFICE/OUTPT VISIT, EST, LEVL III, 20-29 MIN  Patient PCP Information       Provider PCP Type    Valeria Melendrez NP General            Reason for Visit / Chief Complaint: Medication Refill (Patient is 29 year old female, presents to clinic for medication refill and medication change. Patient would like to discuss going up on Rexulti. ) and Gastroesophageal Reflux (Patient needing refill on omeprazole. )         History of Present Illness / Problem Focused Workflow     29 year old female presents to clinic for medication refill and medication change. Patient would like to discuss going up on Rexulti. States she does not feel that it is effective. She has been more depressed lately. Denies suicidal or homicidal ideations.   Gastroesophageal Reflux: Patient needing refill on omeprazole.             Review of Systems     @Review of Systems   Constitutional:  Positive for activity change. Negative for unexpected weight change.   HENT:  Negative for hearing loss, rhinorrhea and trouble swallowing.    Eyes:  Negative for discharge and visual disturbance.   Respiratory:  Negative for chest tightness and wheezing.    Cardiovascular:  Positive for palpitations. Negative for chest pain.   Gastrointestinal:  Negative for blood in stool, constipation, diarrhea and vomiting.   Endocrine: Negative for polydipsia.   Genitourinary:  Negative for difficulty urinating, dysuria, hematuria and menstrual problem.   Musculoskeletal:  Negative for arthralgias, joint swelling and neck pain.   Neurological:  Positive for headaches. Negative for weakness.   Psychiatric/Behavioral:  Positive for depressed mood and dysphoric mood. Negative for confusion.        Medical / Social / Family History     Past  Medical History:   Diagnosis Date    Cardiac murmur     Endometriosis     GERD (gastroesophageal reflux disease)     History of bee sting allergy     Migraine headache 11/13/2019    PCOS (polycystic ovarian syndrome)     Periumbilical hernia 08/20/2020    repaired by Dr. Dwyer on 10/07/2020    PONV (postoperative nausea and vomiting)     Retroflexion of uterus 07/31/2018    3rd degree       Past Surgical History:   Procedure Laterality Date    CHOLECYSTECTOMY  3-2022    Cyst removed from cervix      Cyst removed from ovary      DILATION AND CURETTAGE OF UTERUS      HERNIA REPAIR      HYSTERECTOMY      LAPAROSCOPIC CHOLECYSTECTOMY N/A 03/09/2022    Procedure: CHOLECYSTECTOMY, LAPAROSCOPIC;  Surgeon: Denny Dwyer MD;  Location: [facility];  Service: General;  Laterality: N/A;    LOOP ELECTROSURGICAL EXCISION PROCEDURE (LEEP)      Operative Laparoscopy with lysis of adhesions      RH/BSO with limited cystoscopy  09/24/2018    TONSILLECTOMY  2018       Medications and Allergies     Medications  Outpatient Medications Marked as Taking for the 1/30/25 encounter (Office Visit) with Valeria Melendrez NP   Medication Sig Dispense Refill    acetaZOLAMIDE (DIAMOX) 125 MG Tab Take 125 mg by mouth 2 (two) times daily.      albuterol (PROVENTIL) 2.5 mg /3 mL (0.083 %) nebulizer solution Take 3 mLs (2.5 mg total) by nebulization every 4 to 6 hours as needed for Wheezing. 1 each 1    albuterol (PROVENTIL/VENTOLIN HFA) 90 mcg/actuation inhaler Inhale 1-2 puffs into the lungs every 6 (six) hours as needed for Wheezing. Rescue 18 g 1    BOTOX 200 unit SolR Inject 155 Units into the skin every 3 (three) months.      butalbital-acetaminophen-caff -40 mg Cap Take 1 capsule by mouth every 4 (four) hours as needed (migraine).      cyanocobalamin 1,000 mcg/mL injection Inject 1 mL (1,000 mcg total) into the muscle every 30 days. 1 mL 5    EPINEPHrine (EPIPEN) 0.3 mg/0.3 mL AtIn Inject 0.3 mLs (0.3 mg total) into the muscle once.  for 1 dose 0.3 mL 0    estradioL (ESTRACE) 2 MG tablet Take 1 tablet (2 mg total) by mouth 2 (two) times daily. 60 tablet 2    fluticasone-salmeterol diskus inhaler 250-50 mcg Inhale 1 puff into the lungs daily as needed (wheezing). Controller 1 each 3    gabapentin (NEURONTIN) 300 MG capsule TAKE ONE TABLET BY MOUTH THREE times DAILY 90 capsule 3    hydroCHLOROthiazide (HYDRODIURIL) 25 MG tablet Take 25 mg by mouth once daily.      hyoscyamine 0.125 mg Subl Place 1 tablet (0.125 mg total) under the tongue every 4 (four) hours as needed (stomach cramp). 30 tablet 0    metaxalone (SKELAXIN) 800 MG tablet Take 800 mg by mouth 2 (two) times daily as needed.      ondansetron (ZOFRAN-ODT) 8 MG TbDL Take 1 tablet (8 mg total) by mouth every 6 (six) hours as needed (nausea). 30 tablet 1    promethazine (PHENERGAN) 12.5 MG Tab Take 12.5 mg by mouth 4 (four) times daily as needed.      scopolamine (TRANSDERM-SCOP) 1.3-1.5 mg (1 mg over 3 days) Place 1 patch onto the skin Every 3 (three) days.      ZEMBRACE SYMTOUCH 3 mg/0.5 mL PnIj INJECT 3MG UNDER THE SKIN AT ONSET OF MIGRAINE;MAY REPEAT DOSE IF NEEDED AFTER 1 HOUR. MAX OF 4 INJECTIONS IN 24 HOURS      [DISCONTINUED] omeprazole (PRILOSEC) 40 MG capsule Take 1 capsule (40 mg total) by mouth every morning. 90 capsule 0    [DISCONTINUED] REXULTI 1 mg Tab Take 1 tablet by mouth once daily 90 tablet 0       Allergies  Review of patient's allergies indicates:   Allergen Reactions    Adhesive      Adhesive on hormone patch    Allergen ext-venom-honey bee Other (See Comments)    Insect venom      Bee sting       Physical Examination     Vitals:    01/30/25 1202   BP: 107/78   Pulse: 100   Resp: 19   Temp: 99.4 °F (37.4 °C)     Physical Exam  Vitals and nursing note reviewed.   Constitutional:       Appearance: She is obese.   HENT:      Head: Normocephalic.      Nose: Nose normal.      Mouth/Throat:      Mouth: Mucous membranes are moist.      Pharynx: Oropharynx is clear. No  posterior oropharyngeal erythema.   Eyes:      Conjunctiva/sclera: Conjunctivae normal.   Cardiovascular:      Rate and Rhythm: Normal rate and regular rhythm.      Pulses: Normal pulses.      Heart sounds: Normal heart sounds.   Pulmonary:      Effort: Pulmonary effort is normal.      Breath sounds: Normal breath sounds.   Abdominal:      General: Abdomen is flat. Bowel sounds are normal. There is no distension.      Palpations: Abdomen is soft.   Musculoskeletal:         General: No swelling or tenderness. Normal range of motion.      Cervical back: Normal range of motion.      Right lower leg: No edema.      Left lower leg: No edema.   Skin:     General: Skin is warm and dry.      Capillary Refill: Capillary refill takes less than 2 seconds.   Neurological:      Mental Status: She is alert. Mental status is at baseline.   Psychiatric:         Mood and Affect: Mood is depressed.         Behavior: Behavior normal.         Thought Content: Thought content does not include homicidal or suicidal ideation. Thought content does not include homicidal or suicidal plan.               Lab Results   Component Value Date    WBC 8.62 07/02/2024    HGB 11.9 (L) 07/02/2024    HCT 37.3 (L) 07/02/2024    MCV 86.3 07/02/2024     07/02/2024        CMP  Sodium   Date Value Ref Range Status   02/28/2024 139 136 - 145 mmol/L Final     Potassium   Date Value Ref Range Status   02/28/2024 4.5 3.5 - 5.1 mmol/L Final     Chloride   Date Value Ref Range Status   02/28/2024 106 98 - 107 mmol/L Final     CO2   Date Value Ref Range Status   02/28/2024 31 21 - 32 mmol/L Final     Glucose   Date Value Ref Range Status   02/28/2024 96 74 - 106 mg/dL Final     BUN   Date Value Ref Range Status   02/28/2024 9 7 - 18 mg/dL Final     Creatinine   Date Value Ref Range Status   02/28/2024 0.70 0.55 - 1.02 mg/dL Final     Calcium   Date Value Ref Range Status   02/28/2024 9.3 8.5 - 10.1 mg/dL Final     Total Protein   Date Value Ref Range Status    02/28/2024 7.7 6.4 - 8.2 g/dL Final     Albumin   Date Value Ref Range Status   02/28/2024 3.3 (L) 3.5 - 5.0 g/dL Final     Bilirubin, Total   Date Value Ref Range Status   02/28/2024 0.2 >0.0 - 1.2 mg/dL Final     Alk Phos   Date Value Ref Range Status   02/28/2024 61 37 - 98 U/L Final     AST   Date Value Ref Range Status   02/28/2024 19 15 - 37 U/L Final     ALT   Date Value Ref Range Status   02/28/2024 20 13 - 56 U/L Final     Anion Gap   Date Value Ref Range Status   02/28/2024 7 7 - 16 mmol/L Final     eGFR   Date Value Ref Range Status   02/28/2024 123 >=60 mL/min/1.73m2 Final     Procedures   Assessment and Plan (including Health Maintenance)   :    Plan:     Problem List Items Addressed This Visit          Psychiatric    Current mild episode of major depressive disorder - Primary    Current Assessment & Plan     Will increase her rexulti and continue Pristiq. Follow up in 1 month to discuss effectiveness. Denies suicidal or homicidal ideations.             GI    Gastroesophageal reflux disease without esophagitis    Current Assessment & Plan     Patient reports well controlled on Prilosec. Continue current dosage. Follow up in 3 months or as needed.          Relevant Medications    omeprazole (PRILOSEC) 40 MG capsule       Health Maintenance Topics with due status: Not Due       Topic Last Completion Date    TETANUS VACCINE 08/31/2019    Pneumococcal Vaccines (Age 0-49) 10/13/2022    RSV Vaccine (Age 60+ and Pregnant patients) Not Due       Future Appointments   Date Time Provider Department Center   2/18/2025  1:20 PM Valeria Melendrez NP [facility] [place]   6/17/2025  8:00 AM Valeria Melendrez NP [facility] [place]        Health Maintenance Due   Topic Date Due    Influenza Vaccine (1) 09/01/2024    COVID-19 Vaccine (2 - 2024-25 season) 09/01/2024          Signature:  [facility]  [address] [place]  [address]    Date of encounter: 1/30/25

## 2025-02-18 ENCOUNTER — OFFICE VISIT (OUTPATIENT)
Dept: FAMILY MEDICINE | Facility: CLINIC | Age: 30
End: 2025-02-18
Payer: COMMERCIAL

## 2025-02-18 VITALS
TEMPERATURE: 99 F | SYSTOLIC BLOOD PRESSURE: 110 MMHG | RESPIRATION RATE: 20 BRPM | BODY MASS INDEX: 39.83 KG/M2 | DIASTOLIC BLOOD PRESSURE: 80 MMHG | HEART RATE: 108 BPM | WEIGHT: 262.81 LBS | OXYGEN SATURATION: 98 % | HEIGHT: 68 IN

## 2025-02-18 DIAGNOSIS — R10.9 ABDOMINAL CRAMPING: ICD-10-CM

## 2025-02-18 DIAGNOSIS — R79.9 ABNORMAL FINDING OF BLOOD CHEMISTRY, UNSPECIFIED: ICD-10-CM

## 2025-02-18 DIAGNOSIS — R00.2 PALPITATION: Primary | ICD-10-CM

## 2025-02-18 LAB
ALBUMIN SERPL BCP-MCNC: 3.5 G/DL (ref 3.5–5)
ALBUMIN/GLOB SERPL: 0.9 {RATIO}
ALP SERPL-CCNC: 69 U/L (ref 40–150)
ALT SERPL W P-5'-P-CCNC: 12 U/L
ANION GAP SERPL CALCULATED.3IONS-SCNC: 14 MMOL/L (ref 7–16)
AST SERPL W P-5'-P-CCNC: 35 U/L (ref 5–34)
BASOPHILS # BLD AUTO: 0.05 K/UL (ref 0–0.2)
BASOPHILS NFR BLD AUTO: 0.4 % (ref 0–1)
BILIRUB SERPL-MCNC: 0.2 MG/DL
BUN SERPL-MCNC: 6 MG/DL (ref 7–19)
BUN/CREAT SERPL: 9 (ref 6–20)
CALCIUM SERPL-MCNC: 9.2 MG/DL (ref 8.4–10.2)
CHLORIDE SERPL-SCNC: 98 MMOL/L (ref 98–107)
CO2 SERPL-SCNC: 27 MMOL/L (ref 22–29)
CREAT SERPL-MCNC: 0.69 MG/DL (ref 0.55–1.02)
CRP SERPL HS-MCNC: 14.35 MG/L
DIFFERENTIAL METHOD BLD: ABNORMAL
EGFR (NO RACE VARIABLE) (RUSH/TITUS): 122 ML/MIN/1.73M2
EKG 12-LEAD: NORMAL
EOSINOPHIL # BLD AUTO: 0.08 K/UL (ref 0–0.5)
EOSINOPHIL NFR BLD AUTO: 0.7 % (ref 1–4)
ERYTHROCYTE [DISTWIDTH] IN BLOOD BY AUTOMATED COUNT: 11.8 % (ref 11.5–14.5)
ERYTHROCYTE [SEDIMENTATION RATE] IN BLOOD BY WESTERGREN METHOD: 29 MM/HR (ref 0–20)
GLOBULIN SER-MCNC: 4 G/DL (ref 2–4)
GLUCOSE SERPL-MCNC: 101 MG/DL (ref 74–100)
HCT VFR BLD AUTO: 38.9 % (ref 38–47)
HGB BLD-MCNC: 12.5 G/DL (ref 12–16)
IMM GRANULOCYTES # BLD AUTO: 0.02 K/UL (ref 0–0.04)
IMM GRANULOCYTES NFR BLD: 0.2 % (ref 0–0.4)
IRON SATN MFR SERPL: 14 % (ref 20–50)
IRON SERPL-MCNC: 46 UG/DL (ref 50–170)
LYMPHOCYTES # BLD AUTO: 2.71 K/UL (ref 1–4.8)
LYMPHOCYTES NFR BLD AUTO: 23.6 % (ref 27–41)
MCH RBC QN AUTO: 27.3 PG (ref 27–31)
MCHC RBC AUTO-ENTMCNC: 32.1 G/DL (ref 32–36)
MCV RBC AUTO: 84.9 FL (ref 80–96)
MONOCYTES # BLD AUTO: 0.8 K/UL (ref 0–0.8)
MONOCYTES NFR BLD AUTO: 7 % (ref 2–6)
MPC BLD CALC-MCNC: 9.7 FL (ref 9.4–12.4)
NEUTROPHILS # BLD AUTO: 7.84 K/UL (ref 1.8–7.7)
NEUTROPHILS NFR BLD AUTO: 68.1 % (ref 53–65)
NRBC # BLD AUTO: 0 X10E3/UL
NRBC, AUTO (.00): 0 %
PLATELET # BLD AUTO: 462 K/UL (ref 150–400)
POTASSIUM SERPL-SCNC: 3.7 MMOL/L (ref 3.5–5.1)
PR INTERVAL: NORMAL
PROT SERPL-MCNC: 7.5 G/DL (ref 6.4–8.3)
PRT AXES: NORMAL
QRS DURATION: NORMAL
QT/QTC: NORMAL
RBC # BLD AUTO: 4.58 M/UL (ref 4.2–5.4)
SODIUM SERPL-SCNC: 135 MMOL/L (ref 136–145)
TIBC SERPL-MCNC: 280 UG/DL (ref 70–310)
TIBC SERPL-MCNC: 326 UG/DL (ref 250–450)
TRANSFERRIN SERPL-MCNC: 293 MG/DL (ref 180–382)
VENTRICULAR RATE: NORMAL
WBC # BLD AUTO: 11.5 K/UL (ref 4.5–11)

## 2025-02-18 PROCEDURE — 86430 RHEUMATOID FACTOR TEST QUAL: CPT | Mod: ,,, | Performed by: CLINICAL MEDICAL LABORATORY

## 2025-02-18 PROCEDURE — 83550 IRON BINDING TEST: CPT | Mod: ,,, | Performed by: CLINICAL MEDICAL LABORATORY

## 2025-02-18 PROCEDURE — 80053 COMPREHEN METABOLIC PANEL: CPT | Mod: ,,, | Performed by: CLINICAL MEDICAL LABORATORY

## 2025-02-18 PROCEDURE — 86141 C-REACTIVE PROTEIN HS: CPT | Mod: ,,, | Performed by: CLINICAL MEDICAL LABORATORY

## 2025-02-18 PROCEDURE — 85025 COMPLETE CBC W/AUTO DIFF WBC: CPT | Mod: ,,, | Performed by: CLINICAL MEDICAL LABORATORY

## 2025-02-18 PROCEDURE — 83540 ASSAY OF IRON: CPT | Mod: ,,, | Performed by: CLINICAL MEDICAL LABORATORY

## 2025-02-18 PROCEDURE — 3008F BODY MASS INDEX DOCD: CPT | Mod: ,,, | Performed by: NURSE PRACTITIONER

## 2025-02-18 PROCEDURE — 3079F DIAST BP 80-89 MM HG: CPT | Mod: ,,, | Performed by: NURSE PRACTITIONER

## 2025-02-18 PROCEDURE — 93000 ELECTROCARDIOGRAM COMPLETE: CPT | Mod: ,,, | Performed by: NURSE PRACTITIONER

## 2025-02-18 PROCEDURE — 99213 OFFICE O/P EST LOW 20 MIN: CPT | Mod: ,,, | Performed by: NURSE PRACTITIONER

## 2025-02-18 PROCEDURE — 3074F SYST BP LT 130 MM HG: CPT | Mod: ,,, | Performed by: NURSE PRACTITIONER

## 2025-02-18 PROCEDURE — 85651 RBC SED RATE NONAUTOMATED: CPT | Mod: ,,, | Performed by: CLINICAL MEDICAL LABORATORY

## 2025-02-18 RX ORDER — ASPIRIN 81 MG/1
81 TABLET ORAL DAILY
COMMUNITY

## 2025-02-18 RX ORDER — HYOSCYAMINE SULFATE 0.12 MG/1
0.12 TABLET SUBLINGUAL EVERY 4 HOURS PRN
Qty: 30 TABLET | Refills: 0 | Status: SHIPPED | OUTPATIENT
Start: 2025-02-18

## 2025-02-18 RX ORDER — GABAPENTIN 300 MG/1
300 CAPSULE ORAL 3 TIMES DAILY
Qty: 90 CAPSULE | Refills: 3 | Status: SHIPPED | OUTPATIENT
Start: 2025-02-18

## 2025-02-19 ENCOUNTER — RESULTS FOLLOW-UP (OUTPATIENT)
Dept: FAMILY MEDICINE | Facility: CLINIC | Age: 30
End: 2025-02-19
Payer: COMMERCIAL

## 2025-02-19 DIAGNOSIS — R79.82 CRP ELEVATED: Primary | ICD-10-CM

## 2025-02-19 LAB — RHEUMATOID FACT SER NEPH-ACNC: NEGATIVE [IU]/ML

## 2025-02-19 NOTE — PROGRESS NOTES
Labs reviewed: Iron slightly low. She had mentioned she is not able to tolerate oral iron supplementation. She should increase iron in her diet. Foods high in iron include red meats, chick, turkey, eggs, salmon, tuna, liver, nuts and seeds, dried fruit, dark leafy green veggies such as spinach or broccoli, beans, and iron fortified cereals. She could also take a multivitamin with iron in it. CMP normal or clinically insignificant. CBC showed elevated platelets which can be related to inflammation. Her inflammatory markers ESR and CRP were both elevated. Rheumatoid factor negative. I would recommend referral to Rheumatology. Please see if she has ever seen them before or has a preference who she would like to see.

## 2025-02-24 ENCOUNTER — OFFICE VISIT (OUTPATIENT)
Dept: CARDIOLOGY | Facility: CLINIC | Age: 30
End: 2025-02-24
Payer: COMMERCIAL

## 2025-02-24 ENCOUNTER — HOSPITAL ENCOUNTER (OUTPATIENT)
Dept: CARDIOLOGY | Facility: HOSPITAL | Age: 30
Discharge: HOME | End: 2025-02-24
Attending: STUDENT IN AN ORGANIZED HEALTH CARE EDUCATION/TRAINING PROGRAM
Payer: COMMERCIAL

## 2025-02-24 VITALS
SYSTOLIC BLOOD PRESSURE: 120 MMHG | BODY MASS INDEX: 41.98 KG/M2 | RESPIRATION RATE: 18 BRPM | HEART RATE: 104 BPM | DIASTOLIC BLOOD PRESSURE: 82 MMHG | HEIGHT: 68 IN | WEIGHT: 277 LBS

## 2025-02-24 DIAGNOSIS — I49.8 OTHER SPECIFIED CARDIAC ARRHYTHMIAS: ICD-10-CM

## 2025-02-24 DIAGNOSIS — R00.2 PALPITATION: ICD-10-CM

## 2025-02-24 DIAGNOSIS — I49.8 OTHER SPECIFIED CARDIAC ARRHYTHMIAS: Primary | ICD-10-CM

## 2025-02-24 PROCEDURE — 3074F SYST BP LT 130 MM HG: CPT | Mod: ,,, | Performed by: STUDENT IN AN ORGANIZED HEALTH CARE EDUCATION/TRAINING PROGRAM

## 2025-02-24 PROCEDURE — 93246 EXT ECG>7D<15D RECORDING: CPT

## 2025-02-24 PROCEDURE — 1159F MED LIST DOCD IN RCRD: CPT | Mod: ,,, | Performed by: STUDENT IN AN ORGANIZED HEALTH CARE EDUCATION/TRAINING PROGRAM

## 2025-02-24 PROCEDURE — 99215 OFFICE O/P EST HI 40 MIN: CPT | Mod: PBBFAC | Performed by: STUDENT IN AN ORGANIZED HEALTH CARE EDUCATION/TRAINING PROGRAM

## 2025-02-24 PROCEDURE — 3079F DIAST BP 80-89 MM HG: CPT | Mod: ,,, | Performed by: STUDENT IN AN ORGANIZED HEALTH CARE EDUCATION/TRAINING PROGRAM

## 2025-02-24 PROCEDURE — 99999 PR PBB SHADOW E&M-EST. PATIENT-LVL V: CPT | Mod: PBBFAC,,, | Performed by: STUDENT IN AN ORGANIZED HEALTH CARE EDUCATION/TRAINING PROGRAM

## 2025-02-24 PROCEDURE — 3008F BODY MASS INDEX DOCD: CPT | Mod: ,,, | Performed by: STUDENT IN AN ORGANIZED HEALTH CARE EDUCATION/TRAINING PROGRAM

## 2025-02-24 PROCEDURE — 99203 OFFICE O/P NEW LOW 30 MIN: CPT | Mod: S$PBB,,, | Performed by: STUDENT IN AN ORGANIZED HEALTH CARE EDUCATION/TRAINING PROGRAM

## 2025-02-24 NOTE — PROGRESS NOTES
"PCP: Valeria Melendrez NP    Referring Provider:     Subjective:   Yeny Beaver is a 29 y.o. female with hx of migraine, mood disorder, possibly medication related HTN who presents for evaluation of palpitations.    Patient reports a few weeks of worsening palpitations. Reports episodes lasting upto 30 mins. Feels like skipped beats.       Fhx: non-contrubutary  Shx: Reports drinking caffienated beverages, no smoking, etoh or drug use    EKG - 2/24/25 - Sinus tachycardia    ECHO - No results found for this or any previous visit.      CATH - No results found for this or any previous visit.      Stress -     Lab Results   Component Value Date     (L) 02/18/2025    K 3.7 02/18/2025    CL 98 02/18/2025    CO2 27 02/18/2025    BUN 6 (L) 02/18/2025    CREATININE 0.69 02/18/2025    CALCIUM 9.2 02/18/2025    ANIONGAP 14 02/18/2025    ESTGFRAFRICA 158 07/24/2020    EGFRNONAA 141 03/12/2022       Lab Results   Component Value Date    CHOL 188 06/17/2024    CHOL 176 06/19/2023    CHOL 176 06/13/2022     Lab Results   Component Value Date    HDL 52 06/17/2024    HDL 53 06/19/2023    HDL 46 06/13/2022     Lab Results   Component Value Date    LDLCALC 115 06/17/2024    LDLCALC 109 06/19/2023    LDLCALC 109 06/13/2022     Lab Results   Component Value Date    TRIG 106 06/17/2024    TRIG 69 06/19/2023    TRIG 104 06/13/2022     Lab Results   Component Value Date    CHOLHDL 3.6 06/17/2024    CHOLHDL 3.3 06/19/2023    CHOLHDL 3.8 06/13/2022       Lab Results   Component Value Date    WBC 11.50 (H) 02/18/2025    HGB 12.5 02/18/2025    HCT 38.9 02/18/2025    MCV 84.9 02/18/2025     (H) 02/18/2025         Current Medications[1]    Review of Systems   Respiratory:  Negative for cough and shortness of breath.    Cardiovascular:  Negative for chest pain, palpitations, orthopnea, claudication, leg swelling and PND.         Objective:   /82   Pulse 104   Resp 18   Ht 5' 8" (1.727 m)   Wt 125.6 kg (277 lb)   LMP  " (LMP Unknown)   BMI 42.12 kg/m²     Physical Exam  Constitutional:       Appearance: Normal appearance.   Cardiovascular:      Rate and Rhythm: Regular rhythm. Tachycardia present.      Pulses: Normal pulses.      Heart sounds: Normal heart sounds. No murmur heard.  Pulmonary:      Effort: Pulmonary effort is normal.      Breath sounds: Normal breath sounds.   Musculoskeletal:         General: No swelling.   Neurological:      Mental Status: She is alert and oriented to person, place, and time.           Assessment:     1. Palpitation  Ambulatory referral/consult to Cardiology    Cardiac Monitor - 3-15 Day Adult (Cupid Only)      2. Other specified cardiac arrhythmias  EKG 12-lead            Plan:   Palpitation  Baseline tachycardic  TSH normal   Will get ziopatch  Avoid caffiene               [1]   Current Outpatient Medications:     albuterol (PROVENTIL) 2.5 mg /3 mL (0.083 %) nebulizer solution, Take 3 mLs (2.5 mg total) by nebulization every 4 to 6 hours as needed for Wheezing., Disp: 1 each, Rfl: 1    albuterol (PROVENTIL/VENTOLIN HFA) 90 mcg/actuation inhaler, Inhale 1-2 puffs into the lungs every 6 (six) hours as needed for Wheezing. Rescue, Disp: 18 g, Rfl: 1    aspirin (ECOTRIN) 81 MG EC tablet, Take 81 mg by mouth once daily., Disp: , Rfl:     BOTOX 200 unit SolR, Inject 155 Units into the skin every 3 (three) months., Disp: , Rfl:     brexpiprazole (REXULTI) 2 mg Tab, Take 1 tablet (2 mg total) by mouth once daily., Disp: 30 tablet, Rfl: 5    cyanocobalamin 1,000 mcg/mL injection, Inject 1 mL (1,000 mcg total) into the muscle every 30 days., Disp: 1 mL, Rfl: 5    desvenlafaxine succinate (PRISTIQ) 50 MG Tb24, Take 1 tablet (50 mg total) by mouth once daily., Disp: 90 tablet, Rfl: 1    estradioL (ESTRACE) 2 MG tablet, Take 1 tablet (2 mg total) by mouth 2 (two) times daily., Disp: 60 tablet, Rfl: 2    fluticasone-salmeterol diskus inhaler 250-50 mcg, Inhale 1 puff into the lungs daily as needed  (wheezing). Controller, Disp: 1 each, Rfl: 3    gabapentin (NEURONTIN) 300 MG capsule, Take 1 capsule (300 mg total) by mouth 3 (three) times daily., Disp: 90 capsule, Rfl: 3    hydroCHLOROthiazide (HYDRODIURIL) 25 MG tablet, Take 25 mg by mouth once daily., Disp: , Rfl:     hyoscyamine 0.125 mg Subl, Place 1 tablet (0.125 mg total) under the tongue every 4 (four) hours as needed (stomach cramp)., Disp: 30 tablet, Rfl: 0    metaxalone (SKELAXIN) 800 MG tablet, Take 800 mg by mouth 2 (two) times daily as needed., Disp: , Rfl:     omeprazole (PRILOSEC) 40 MG capsule, Take 1 capsule (40 mg total) by mouth every morning., Disp: 90 capsule, Rfl: 1    ondansetron (ZOFRAN-ODT) 8 MG TbDL, Take 1 tablet (8 mg total) by mouth every 6 (six) hours as needed (nausea)., Disp: 30 tablet, Rfl: 1    promethazine (PHENERGAN) 12.5 MG Tab, Take 12.5 mg by mouth 4 (four) times daily as needed., Disp: , Rfl:     scopolamine (TRANSDERM-SCOP) 1.3-1.5 mg (1 mg over 3 days), Place 1 patch onto the skin Every 3 (three) days., Disp: , Rfl:     triamcinolone acetonide 0.1% (KENALOG) 0.1 % ointment, Apply to affected area 2 TIMES A DAY, NO LONGER THEN 1 WEEK, Disp: 454 g, Rfl: 0    ZEMBRACE SYMTOUCH 3 mg/0.5 mL PnIj, INJECT 3MG UNDER THE SKIN AT ONSET OF MIGRAINE;MAY REPEAT DOSE IF NEEDED AFTER 1 HOUR. MAX OF 4 INJECTIONS IN 24 HOURS, Disp: , Rfl:     EPINEPHrine (EPIPEN) 0.3 mg/0.3 mL AtIn, Inject 0.3 mLs (0.3 mg total) into the muscle once. for 1 dose, Disp: 0.3 mL, Rfl: 0

## 2025-02-27 DIAGNOSIS — R79.82 CRP ELEVATED: Primary | ICD-10-CM

## 2025-03-02 NOTE — PROGRESS NOTES
Valeria Melendrez NP   [place] [address]  [place]  [address]      PATIENT NAME: Yeny Beaver  : 1995  DATE: 25  MRN: [id number]      Billing Provider: Valeria Melendrez NP  Level of Service: [place] OFFICE/OUTPT VISIT, EST, LEVL III, 20-29 MIN  Patient PCP Information       Provider PCP Type    Valeria Melendrez NP General            Reason for Visit / Chief Complaint: Palpitations (Patient reporting for about 2 weeks she has been experiencing at least 8-9 episodes of being flushed,s.o.b, and chest tenderness with what she describes as palpations. Symptoms relieved by either taking baby aspirin, sitting down and having fan on her. Denies any syncope episodes. Reporting spells lasting for about 10-15mins. Last dose of ASA around 1030) and Referral (If referral is placed patient has no preference for cardiologist)         History of Present Illness / Problem Focused Workflow     29 year old female presents to clinic with complaints of palpitations.  Patient reporting for about 2 weeks now she has been experiencing at least 8-9 episodes of being flushed,s.o.b, and chest tenderness with what she describes as palpations. Symptoms are sometimes relieved by  taking baby aspirin, sitting down and having fan on her. Denies any syncopal episodes. Reporting spells lasting for about 10-15mins. She is requesting referral to Cardiology,           Review of Systems     @Review of Systems   Constitutional:  Negative for activity change and unexpected weight change.   HENT:  Negative for hearing loss, rhinorrhea and trouble swallowing.    Eyes:  Negative for discharge and visual disturbance.   Respiratory:  Positive for chest tightness. Negative for wheezing.    Cardiovascular:  Positive for chest pain and palpitations.   Gastrointestinal:  Negative for blood in stool, constipation, diarrhea and vomiting.   Endocrine: Negative for polydipsia and polyuria.   Genitourinary:  Negative for  difficulty urinating, dysuria, hematuria and menstrual problem.   Musculoskeletal:  Negative for arthralgias, joint swelling and neck pain.   Neurological:  Positive for headaches. Negative for weakness.   Psychiatric/Behavioral:  Negative for confusion and dysphoric mood.        Medical / Social / Family History     Past Medical History:   Diagnosis Date    Cardiac murmur     Endometriosis     GERD (gastroesophageal reflux disease)     History of bee sting allergy     Migraine headache 11/13/2019    PCOS (polycystic ovarian syndrome)     Periumbilical hernia 08/20/2020    repaired by Dr. Dwyer on 10/07/2020    PONV (postoperative nausea and vomiting)     Retroflexion of uterus 07/31/2018    3rd degree       Past Surgical History:   Procedure Laterality Date    CHOLECYSTECTOMY  3-2022    Cyst removed from cervix      Cyst removed from ovary      DILATION AND CURETTAGE OF UTERUS      HERNIA REPAIR      HYSTERECTOMY      LAPAROSCOPIC CHOLECYSTECTOMY N/A 03/09/2022    Procedure: CHOLECYSTECTOMY, LAPAROSCOPIC;  Surgeon: Denny Dwyer MD;  Location: [facility];  Service: General;  Laterality: N/A;    LOOP ELECTROSURGICAL EXCISION PROCEDURE (LEEP)      Operative Laparoscopy with lysis of adhesions      RH/BSO with limited cystoscopy  09/24/2018    TONSILLECTOMY  2018       Medications and Allergies     Medications  Outpatient Medications Marked as Taking for the 2/18/25 encounter (Office Visit) with Valeria Melendrez NP   Medication Sig Dispense Refill    albuterol (PROVENTIL) 2.5 mg /3 mL (0.083 %) nebulizer solution Take 3 mLs (2.5 mg total) by nebulization every 4 to 6 hours as needed for Wheezing. 1 each 1    albuterol (PROVENTIL/VENTOLIN HFA) 90 mcg/actuation inhaler Inhale 1-2 puffs into the lungs every 6 (six) hours as needed for Wheezing. Rescue 18 g 1    aspirin (ECOTRIN) 81 MG EC tablet Take 81 mg by mouth once daily.      brexpiprazole (REXULTI) 2 mg Tab Take 1 tablet (2 mg total) by mouth once daily. 30  tablet 5    cyanocobalamin 1,000 mcg/mL injection Inject 1 mL (1,000 mcg total) into the muscle every 30 days. 1 mL 5    desvenlafaxine succinate (PRISTIQ) 50 MG Tb24 Take 1 tablet (50 mg total) by mouth once daily. 90 tablet 1    estradioL (ESTRACE) 2 MG tablet Take 1 tablet (2 mg total) by mouth 2 (two) times daily. 60 tablet 2    fluticasone-salmeterol diskus inhaler 250-50 mcg Inhale 1 puff into the lungs daily as needed (wheezing). Controller 1 each 3    hydroCHLOROthiazide (HYDRODIURIL) 25 MG tablet Take 25 mg by mouth once daily.      metaxalone (SKELAXIN) 800 MG tablet Take 800 mg by mouth 2 (two) times daily as needed.      omeprazole (PRILOSEC) 40 MG capsule Take 1 capsule (40 mg total) by mouth every morning. 90 capsule 1    ondansetron (ZOFRAN-ODT) 8 MG TbDL Take 1 tablet (8 mg total) by mouth every 6 (six) hours as needed (nausea). 30 tablet 1    promethazine (PHENERGAN) 12.5 MG Tab Take 12.5 mg by mouth 4 (four) times daily as needed.      scopolamine (TRANSDERM-SCOP) 1.3-1.5 mg (1 mg over 3 days) Place 1 patch onto the skin Every 3 (three) days.      [DISCONTINUED] butalbital-acetaminophen-caff -40 mg Cap Take 1 capsule by mouth every 4 (four) hours as needed (migraine).      [DISCONTINUED] gabapentin (NEURONTIN) 300 MG capsule TAKE ONE TABLET BY MOUTH THREE times DAILY 90 capsule 3    [DISCONTINUED] hyoscyamine 0.125 mg Subl Place 1 tablet (0.125 mg total) under the tongue every 4 (four) hours as needed (stomach cramp). 30 tablet 0       Allergies  Review of patient's allergies indicates:   Allergen Reactions    Adhesive      Adhesive on hormone patch    Allergen ext-venom-honey bee Other (See Comments)    Insect venom      Bee sting       Physical Examination     Vitals:    02/18/25 1345   BP: 110/80   Pulse: 108   Resp: 20   Temp: 98.7 °F (37.1 °C)     Physical Exam  Vitals and nursing note reviewed.   Constitutional:       Appearance: She is obese.   HENT:      Head: Normocephalic.       Nose: Nose normal.      Mouth/Throat:      Mouth: Mucous membranes are moist.      Pharynx: Oropharynx is clear. No posterior oropharyngeal erythema.   Eyes:      Conjunctiva/sclera: Conjunctivae normal.   Cardiovascular:      Rate and Rhythm: Normal rate and regular rhythm.      Pulses: Normal pulses.      Heart sounds: Normal heart sounds.   Pulmonary:      Effort: Pulmonary effort is normal.      Breath sounds: Normal breath sounds.   Abdominal:      General: Abdomen is flat. Bowel sounds are normal. There is no distension.      Palpations: Abdomen is soft.   Musculoskeletal:         General: No swelling or tenderness. Normal range of motion.      Cervical back: Normal range of motion.      Right lower leg: No edema.      Left lower leg: No edema.   Skin:     General: Skin is warm and dry.      Capillary Refill: Capillary refill takes less than 2 seconds.   Neurological:      Mental Status: She is alert. Mental status is at baseline.   Psychiatric:         Mood and Affect: Mood normal.         Behavior: Behavior normal.               Lab Results   Component Value Date    WBC 11.50 (H) 02/18/2025    HGB 12.5 02/18/2025    HCT 38.9 02/18/2025    MCV 84.9 02/18/2025     (H) 02/18/2025        CMP  Sodium   Date Value Ref Range Status   02/18/2025 135 (L) 136 - 145 mmol/L Final     Potassium   Date Value Ref Range Status   02/18/2025 3.7 3.5 - 5.1 mmol/L Final     Chloride   Date Value Ref Range Status   02/18/2025 98 98 - 107 mmol/L Final     CO2   Date Value Ref Range Status   02/18/2025 27 22 - 29 mmol/L Final     Glucose   Date Value Ref Range Status   02/18/2025 101 (H) 74 - 100 mg/dL Final     BUN   Date Value Ref Range Status   02/18/2025 6 (L) 7 - 19 mg/dL Final     Creatinine   Date Value Ref Range Status   02/18/2025 0.69 0.55 - 1.02 mg/dL Final     Calcium   Date Value Ref Range Status   02/18/2025 9.2 8.4 - 10.2 mg/dL Final     Total Protein   Date Value Ref Range Status   02/18/2025 7.5 6.4 - 8.3  g/dL Final     Albumin   Date Value Ref Range Status   02/18/2025 3.5 3.5 - 5.0 g/dL Final     Bilirubin, Total   Date Value Ref Range Status   02/18/2025 0.2 <=1.5 mg/dL Final     Alk Phos   Date Value Ref Range Status   02/18/2025 69 40 - 150 U/L Final     AST   Date Value Ref Range Status   02/18/2025 35 (H) 5 - 34 U/L Final     ALT   Date Value Ref Range Status   02/18/2025 12 <=55 U/L Final     Anion Gap   Date Value Ref Range Status   02/18/2025 14 7 - 16 mmol/L Final     eGFR   Date Value Ref Range Status   02/18/2025 122 >=60 mL/min/1.73m2 Final     Comment:     Estimated GFR calculated using the CKD-EPI creatinine (2021) equation.     Procedures   Assessment and Plan (including Health Maintenance)   :    Plan:     Problem List Items Addressed This Visit          Cardiac/Vascular    Palpitation - Primary    Current Assessment & Plan   EKG performed in clinic and was normal. Labs obtained, will follow up with results. Will refer to Cardiology,.          Relevant Orders    POCT EKG 12-LEAD (Manually Resulted by Ordering Provider) (Completed)    Ambulatory referral/consult to Cardiology    CBC Auto Differential (Completed)    Comprehensive Metabolic Panel (Completed)    Iron and TIBC (Completed)       GI    Abdominal cramping    Relevant Medications    hyoscyamine 0.125 mg Subl     Other Visit Diagnoses         Abnormal finding of blood chemistry, unspecified        Relevant Orders    CRP, High Sensitivity (Completed)    Sedimentation Rate (Completed)    Rheumatoid Factor Screen (Completed)            Health Maintenance Topics with due status: Not Due       Topic Last Completion Date    TETANUS VACCINE 08/31/2019    Pneumococcal Vaccines (Age 0-49) 10/13/2022    RSV Vaccine (Age 60+ and Pregnant patients) Not Due       Future Appointments   Date Time Provider Department Center   3/14/2025  1:20 PM Valeria Melendrez NP [facility] [employer] [place]   5/30/2025  9:00 AM Richard Avendaño MD [facility]    6/17/2025  8:00 AM Valeria Melendrez NP [facility]        Health Maintenance Due   Topic Date Due    Influenza Vaccine (1) 09/01/2024    COVID-19 Vaccine (2 - 2024-25 season) 09/01/2024          Signature:  Valeria Melendrez NP  [address] [place]  [address]    Date of encounter: 2/18/25

## 2025-03-02 NOTE — ASSESSMENT & PLAN NOTE
EKG performed in clinic and was normal. Labs obtained, will follow up with results. Will refer to Cardiology,.

## 2025-03-26 ENCOUNTER — OFFICE VISIT (OUTPATIENT)
Dept: FAMILY MEDICINE | Facility: CLINIC | Age: 30
End: 2025-03-26
Payer: COMMERCIAL

## 2025-03-26 ENCOUNTER — RESULTS FOLLOW-UP (OUTPATIENT)
Dept: FAMILY MEDICINE | Facility: CLINIC | Age: 30
End: 2025-03-26

## 2025-03-26 VITALS
HEIGHT: 68 IN | TEMPERATURE: 98 F | BODY MASS INDEX: 40.01 KG/M2 | RESPIRATION RATE: 19 BRPM | HEART RATE: 78 BPM | OXYGEN SATURATION: 97 % | SYSTOLIC BLOOD PRESSURE: 110 MMHG | WEIGHT: 264 LBS | DIASTOLIC BLOOD PRESSURE: 78 MMHG

## 2025-03-26 DIAGNOSIS — G43.909 MIGRAINE WITHOUT STATUS MIGRAINOSUS, NOT INTRACTABLE, UNSPECIFIED MIGRAINE TYPE: ICD-10-CM

## 2025-03-26 DIAGNOSIS — E78.2 MODERATE MIXED HYPERLIPIDEMIA NOT REQUIRING STATIN THERAPY: Primary | ICD-10-CM

## 2025-03-26 DIAGNOSIS — R03.0 ELEVATED BLOOD PRESSURE READING: ICD-10-CM

## 2025-03-26 DIAGNOSIS — Z13.220 SCREENING FOR LIPOID DISORDERS: ICD-10-CM

## 2025-03-26 PROBLEM — E78.00 ELEVATED CHOLESTEROL: Status: ACTIVE | Noted: 2025-03-26

## 2025-03-26 PROBLEM — J01.40 ACUTE NON-RECURRENT PANSINUSITIS: Status: RESOLVED | Noted: 2022-10-04 | Resolved: 2025-03-26

## 2025-03-26 PROBLEM — H65.93 MIDDLE EAR EFFUSION, BILATERAL: Status: RESOLVED | Noted: 2024-06-17 | Resolved: 2025-03-26

## 2025-03-26 LAB
CHOLEST SERPL-MCNC: 177 MG/DL
CHOLEST/HDLC SERPL: 3.2 {RATIO}
HDLC SERPL-MCNC: 55 MG/DL (ref 35–60)
LDLC SERPL CALC-MCNC: 105 MG/DL
LDLC/HDLC SERPL: 1.9 {RATIO}
NONHDLC SERPL-MCNC: 122 MG/DL
TRIGL SERPL-MCNC: 87 MG/DL (ref 37–140)
VLDLC SERPL-MCNC: 17 MG/DL

## 2025-03-26 PROCEDURE — 80061 LIPID PANEL: CPT | Mod: ,,, | Performed by: CLINICAL MEDICAL LABORATORY

## 2025-03-26 PROCEDURE — 3008F BODY MASS INDEX DOCD: CPT | Mod: ,,, | Performed by: NURSE PRACTITIONER

## 2025-03-26 PROCEDURE — 99213 OFFICE O/P EST LOW 20 MIN: CPT | Mod: 25,,, | Performed by: NURSE PRACTITIONER

## 2025-03-26 PROCEDURE — 96372 THER/PROPH/DIAG INJ SC/IM: CPT | Mod: ,,, | Performed by: NURSE PRACTITIONER

## 2025-03-26 PROCEDURE — 1159F MED LIST DOCD IN RCRD: CPT | Mod: ,,, | Performed by: NURSE PRACTITIONER

## 2025-03-26 PROCEDURE — 3074F SYST BP LT 130 MM HG: CPT | Mod: ,,, | Performed by: NURSE PRACTITIONER

## 2025-03-26 PROCEDURE — 1160F RVW MEDS BY RX/DR IN RCRD: CPT | Mod: ,,, | Performed by: NURSE PRACTITIONER

## 2025-03-26 PROCEDURE — 3078F DIAST BP <80 MM HG: CPT | Mod: ,,, | Performed by: NURSE PRACTITIONER

## 2025-03-26 RX ORDER — ONDANSETRON 2 MG/ML
4 INJECTION INTRAMUSCULAR; INTRAVENOUS
Status: COMPLETED | OUTPATIENT
Start: 2025-03-26 | End: 2025-03-26

## 2025-03-26 RX ORDER — KETOROLAC TROMETHAMINE 30 MG/ML
60 INJECTION, SOLUTION INTRAMUSCULAR; INTRAVENOUS
Status: COMPLETED | OUTPATIENT
Start: 2025-03-26 | End: 2025-03-26

## 2025-03-26 RX ADMIN — ONDANSETRON 4 MG: 2 INJECTION INTRAMUSCULAR; INTRAVENOUS at 10:03

## 2025-03-26 RX ADMIN — KETOROLAC TROMETHAMINE 60 MG: 30 INJECTION, SOLUTION INTRAMUSCULAR; INTRAVENOUS at 10:03

## 2025-03-26 NOTE — ASSESSMENT & PLAN NOTE
Toradol and Zofran given IM in clinic. Instructed to go home and rest in dark, quiet area.   Follow up with Neurology as scheduled.

## 2025-03-26 NOTE — ASSESSMENT & PLAN NOTE
BP well controlled at today's visit. Will continue to monitor. Lifestyle changes to help lower blood pressure reviewed, DASH eating plan encouraged, Weight loss of 0.5-1 lb/week suggested, stressed the importance of routine exercise 30 minutes per day for 3-5 days/week, sodium restriction, and alcohol in moderation less than 2 drinks a day.

## 2025-03-26 NOTE — ASSESSMENT & PLAN NOTE
Rechecked lipid panel today. Will follow up with results. Encouraged low fat/low cholesterol diet with increased exercise.

## 2025-03-26 NOTE — PROGRESS NOTES
Valeria Melendrez NP   [facility]  [address]  [place]  [address]      PATIENT NAME: Yeny Beaver  : 1995  DATE: 3/26/25  MRN: [id number]      Billing Provider: Valeria Melendrez NP  Level of Service: [place] OFFICE/OUTPT VISIT, EST, LEVL III, 20-29 MIN  Patient PCP Information       Provider PCP Type    Valeria Melendrez NP General            Reason for Visit / Chief Complaint: Color Me Healthy (Color me healthy visit. Patient is fasting. ), Hyperlipidemia, Hypertension (Patient takes HCTZ 25 mg daily. ), and Health Maintenance (COVID-19 Vaccine( season) due on 2024/ Declines covid vaccine)         History of Present Illness / Problem Focused Workflow     29 year old female presents to clinic for Color me healthy visit for follow up on elevated blood pressure and elevated cholesterol. She states she is trying to follow low fat/low cholesterol diet with increased exercise. Reports she has not been checking blood pressure but she has not been symptomatic of it being high.   She does report she has had migraine for 2 days now. Has tried her Zembrace injection and has not been relieved. She reports headache is accompanied by nausea and is requesting Toradol and Zofran injection.             Review of Systems     @Review of Systems   Constitutional:  Negative for activity change, appetite change, fatigue and fever.   HENT:  Negative for nasal congestion, ear pain, rhinorrhea, sinus pressure/congestion and sore throat.    Eyes:  Negative for pain, redness, visual disturbance and eye dryness.   Respiratory:  Negative for cough and shortness of breath.    Cardiovascular:  Negative for chest pain and leg swelling.   Gastrointestinal:  Positive for nausea. Negative for abdominal distention, abdominal pain, constipation and diarrhea.   Endocrine: Negative for cold intolerance, heat intolerance and polyuria.   Genitourinary:  Negative for bladder incontinence, dysuria, frequency  and urgency.   Musculoskeletal:  Negative for arthralgias, gait problem and myalgias.   Integumentary:  Negative for color change, rash and wound.   Allergic/Immunologic: Negative for environmental allergies and food allergies.   Neurological:  Positive for headaches. Negative for dizziness, weakness and light-headedness.   Psychiatric/Behavioral:  Negative for behavioral problems and sleep disturbance.        Medical / Social / Family History     Past Medical History:   Diagnosis Date    Cardiac murmur     Endometriosis     GERD (gastroesophageal reflux disease)     History of bee sting allergy     Migraine headache 11/13/2019    PCOS (polycystic ovarian syndrome)     Periumbilical hernia 08/20/2020    repaired by Dr. Dwyer on 10/07/2020    PONV (postoperative nausea and vomiting)     Retroflexion of uterus 07/31/2018    3rd degree       Past Surgical History:   Procedure Laterality Date    CHOLECYSTECTOMY  3-2022    Cyst removed from cervix      Cyst removed from ovary      DILATION AND CURETTAGE OF UTERUS      HERNIA REPAIR      HYSTERECTOMY      LAPAROSCOPIC CHOLECYSTECTOMY N/A 03/09/2022    Procedure: CHOLECYSTECTOMY, LAPAROSCOPIC;  Surgeon: Denny Dwyer MD;  Location: [facility];  Service: General;  Laterality: N/A;    LOOP ELECTROSURGICAL EXCISION PROCEDURE (LEEP)      Operative Laparoscopy with lysis of adhesions      RH/BSO with limited cystoscopy  09/24/2018    TONSILLECTOMY  2018       Medications and Allergies     Medications  Outpatient Medications Marked as Taking for the 3/26/25 encounter (Office Visit) with Valeria Melendrez NP   Medication Sig Dispense Refill    albuterol (PROVENTIL) 2.5 mg /3 mL (0.083 %) nebulizer solution Take 3 mLs (2.5 mg total) by nebulization every 4 to 6 hours as needed for Wheezing. 1 each 1    albuterol (PROVENTIL/VENTOLIN HFA) 90 mcg/actuation inhaler Inhale 1-2 puffs into the lungs every 6 (six) hours as needed for Wheezing. Rescue 18 g 1    BOTOX 200 unit SolR Inject  155 Units into the skin every 3 (three) months.      brexpiprazole (REXULTI) 2 mg Tab Take 1 tablet (2 mg total) by mouth once daily. 30 tablet 5    cyanocobalamin 1,000 mcg/mL injection Inject 1 mL (1,000 mcg total) into the muscle every 30 days. 1 mL 5    desvenlafaxine succinate (PRISTIQ) 50 MG Tb24 Take 1 tablet (50 mg total) by mouth once daily. 90 tablet 1    estradioL (ESTRACE) 2 MG tablet Take 1 tablet (2 mg total) by mouth 2 (two) times daily. 60 tablet 2    fluticasone-salmeterol diskus inhaler 250-50 mcg Inhale 1 puff into the lungs daily as needed (wheezing). Controller 1 each 3    gabapentin (NEURONTIN) 300 MG capsule Take 1 capsule (300 mg total) by mouth 3 (three) times daily. 90 capsule 3    hydroCHLOROthiazide (HYDRODIURIL) 25 MG tablet Take 25 mg by mouth once daily.      hyoscyamine 0.125 mg Subl Place 1 tablet (0.125 mg total) under the tongue every 4 (four) hours as needed (stomach cramp). 30 tablet 0    metaxalone (SKELAXIN) 800 MG tablet Take 800 mg by mouth 2 (two) times daily as needed.      omeprazole (PRILOSEC) 40 MG capsule Take 1 capsule (40 mg total) by mouth every morning. 90 capsule 1    ondansetron (ZOFRAN-ODT) 8 MG TbDL Take 1 tablet (8 mg total) by mouth every 6 (six) hours as needed (nausea). 30 tablet 1    promethazine (PHENERGAN) 12.5 MG Tab Take 12.5 mg by mouth 4 (four) times daily as needed.      scopolamine (TRANSDERM-SCOP) 1.3-1.5 mg (1 mg over 3 days) Place 1 patch onto the skin Every 3 (three) days.      triamcinolone acetonide 0.1% (KENALOG) 0.1 % ointment Apply to affected area 2 TIMES A DAY, NO LONGER THEN 1 WEEK 454 g 0    ZEMBRACE SYMTOUCH 3 mg/0.5 mL PnIj INJECT 3MG UNDER THE SKIN AT ONSET OF MIGRAINE;MAY REPEAT DOSE IF NEEDED AFTER 1 HOUR. MAX OF 4 INJECTIONS IN 24 HOURS         Allergies  Review of patient's allergies indicates:   Allergen Reactions    Adhesive      Adhesive on hormone patch    Allergen ext-venom-honey bee Other (See Comments)    Insect venom       Bee sting       Physical Examination     Vitals:    03/26/25 0844   BP: 110/78   Pulse: 78   Resp: 19   Temp: 98.2 °F (36.8 °C)     Physical Exam  Vitals and nursing note reviewed.   Constitutional:       Appearance: She is obese.   HENT:      Head: Normocephalic.      Nose: Nose normal.      Mouth/Throat:      Mouth: Mucous membranes are moist.      Pharynx: Oropharynx is clear. No posterior oropharyngeal erythema.   Eyes:      Conjunctiva/sclera: Conjunctivae normal.   Cardiovascular:      Rate and Rhythm: Normal rate and regular rhythm.      Pulses: Normal pulses.      Heart sounds: Normal heart sounds.   Pulmonary:      Effort: Pulmonary effort is normal.      Breath sounds: Normal breath sounds.   Abdominal:      General: Abdomen is flat. Bowel sounds are normal. There is no distension.      Palpations: Abdomen is soft.   Musculoskeletal:         General: No swelling or tenderness. Normal range of motion.      Cervical back: Normal range of motion.      Right lower leg: No edema.      Left lower leg: No edema.   Skin:     General: Skin is warm and dry.      Capillary Refill: Capillary refill takes less than 2 seconds.   Neurological:      Mental Status: She is alert. Mental status is at baseline.   Psychiatric:         Mood and Affect: Mood normal.         Behavior: Behavior normal.               Lab Results   Component Value Date    WBC 11.50 (H) 02/18/2025    HGB 12.5 02/18/2025    HCT 38.9 02/18/2025    MCV 84.9 02/18/2025     (H) 02/18/2025        CMP  Sodium   Date Value Ref Range Status   02/18/2025 135 (L) 136 - 145 mmol/L Final     Potassium   Date Value Ref Range Status   02/18/2025 3.7 3.5 - 5.1 mmol/L Final     Chloride   Date Value Ref Range Status   02/18/2025 98 98 - 107 mmol/L Final     CO2   Date Value Ref Range Status   02/18/2025 27 22 - 29 mmol/L Final     Glucose   Date Value Ref Range Status   02/18/2025 101 (H) 74 - 100 mg/dL Final     BUN   Date Value Ref Range Status   02/18/2025  6 (L) 7 - 19 mg/dL Final     Creatinine   Date Value Ref Range Status   02/18/2025 0.69 0.55 - 1.02 mg/dL Final     Calcium   Date Value Ref Range Status   02/18/2025 9.2 8.4 - 10.2 mg/dL Final     Total Protein   Date Value Ref Range Status   02/18/2025 7.5 6.4 - 8.3 g/dL Final     Albumin   Date Value Ref Range Status   02/18/2025 3.5 3.5 - 5.0 g/dL Final     Bilirubin, Total   Date Value Ref Range Status   02/18/2025 0.2 <=1.5 mg/dL Final     Alk Phos   Date Value Ref Range Status   02/18/2025 69 40 - 150 U/L Final     AST   Date Value Ref Range Status   02/18/2025 35 (H) 5 - 34 U/L Final     ALT   Date Value Ref Range Status   02/18/2025 12 <=55 U/L Final     Anion Gap   Date Value Ref Range Status   02/18/2025 14 7 - 16 mmol/L Final     eGFR   Date Value Ref Range Status   02/18/2025 122 >=60 mL/min/1.73m2 Final     Comment:     Estimated GFR calculated using the CKD-EPI creatinine (2021) equation.     Procedures   Assessment and Plan (including Health Maintenance)   :    Plan:     Problem List Items Addressed This Visit       Migraine headache    Current Assessment & Plan   Toradol and Zofran given IM in clinic. Instructed to go home and rest in dark, quiet area.   Follow up with Neurology as scheduled.          Elevated blood pressure reading    Current Assessment & Plan   BP well controlled at today's visit. Will continue to monitor. Lifestyle changes to help lower blood pressure reviewed, DASH eating plan encouraged, Weight loss of 0.5-1 lb/week suggested, stressed the importance of routine exercise 30 minutes per day for 3-5 days/week, sodium restriction, and alcohol in moderation less than 2 drinks a day.          Moderate mixed hyperlipidemia not requiring statin therapy - Primary    Relevant Orders    Lipid Panel (Completed)     Other Visit Diagnoses         Screening for lipoid disorders        Relevant Orders    Lipid Panel (Completed)            Health Maintenance Topics with due status: Not Due        Topic Last Completion Date    TETANUS VACCINE 08/31/2019    Pneumococcal Vaccines (Age 0-49) 10/13/2022    RSV Vaccine (Age 60+ and Pregnant patients) Not Due       Future Appointments   Date Time Provider Department Center   5/30/2025  9:00 AM Richard Avendaño MD [facility]   6/17/2025  8:00 AM Valeria Melendrez NP [facility]        Health Maintenance Due   Topic Date Due    COVID-19 Vaccine (2 - 2024-25 season) 09/01/2024          Signature:  Valeria Melendrez NP  [facility]  [address] [place] [address]  [id number]    Date of encounter: 3/26/25

## 2025-03-27 ENCOUNTER — PATIENT OUTREACH (OUTPATIENT)
Facility: HOSPITAL | Age: 30
End: 2025-03-27
Payer: COMMERCIAL

## 2025-03-27 PROBLEM — E78.2 MODERATE MIXED HYPERLIPIDEMIA NOT REQUIRING STATIN THERAPY: Status: ACTIVE | Noted: 2025-03-27

## 2025-03-27 NOTE — PROGRESS NOTES
Population Health Chart Review & Patient Outreach Details      Further Action Needed If Patient Returns Outreach:            Updates Requested / Reviewed:     []  Care Everywhere    []     []  External Sources ([facility], [facility], [facility], etc.)    [] [facility]   [] [facility]   [] Other:    []  Care Team Updated   []  Removed  or Duplicate Orders   []  Immunization Reconciliation Completed / Queried    [] [place]   [] [place]   [] [place]   [] [place]      Health Maintenance Topics Addressed and Outreach Outcomes / Actions Taken:             Breast Cancer Screening []  Mammogram Order Placed    []  Mammogram Screening Scheduled    []  External Records Requested & Care Team Updated if Applicable    []  External Records Uploaded & Care Team Updated if Applicable    []  Pt Declined Scheduling Mammogram    []  Pt Will Schedule with External Provider / Order Routed & Care Team Updated if Applicable              Cervical Cancer Screening []  Pap Smear Scheduled in Primary Care or OBGYN    []  External Records Requested & Care Team Updated if Applicable       []  External Records Uploaded, Care Team Updated, & History Updated if Applicable    []  Patient Declined Scheduling Pap Smear    []  Patient Will Schedule with External Provider & Care Team Updated if Applicable                  Colorectal Cancer Screening []  Colonoscopy Case Request / Referral / Home Test Order Placed    []  External Records Requested & Care Team Updated if Applicable    []  External Records Uploaded, Care Team Updated, & History Updated if Applicable    []  Patient Declined Completing Colon Cancer Screening    []  Patient Will Schedule with External Provider & Care Team Updated if Applicable    []  Fit Kit Mailed (add the SmartPhrase under additional notes)    []  Reminded Patient to Complete Home Test                Diabetic Eye Exam []  Eye Exam Screening Order Placed    []  Eye Camera Scheduled or Optometry/Ophthalmology Referral  Placed    []  External Records Requested & Care Team Updated if Applicable    []  External Records Uploaded, Care Team Updated, & History Updated if Applicable    []  Patient Declined Scheduling Eye Exam    []  Patient Will Schedule with External Provider & Care Team Updated if Applicable             Blood Pressure Control []  Primary Care Follow Up Visit Scheduled     []  Remote Blood Pressure Reading Captured    []  Patient Declined Remote Reading or Scheduling Appt - Escalated to PCP    []  Patient Will Call Back or Send Portal Message with Reading                 HbA1c & Other Labs []  Overdue Lab(s) Ordered    []  Overdue Lab(s) Scheduled    []  External Records Uploaded & Care Team Updated if Applicable    []  Primary Care Follow Up Visit Scheduled     []  Reminded Patient to Complete A1c Home Test    []  Patient Declined Scheduling Labs or Will Call Back to Schedule    []  Patient Will Schedule with External Provider / Order Routed, & Care Team Updated if Applicable           Primary Care Appointment []  Primary Care Appt Scheduled    []  Patient Declined Scheduling or Will Call Back to Schedule    []  Pt Established with External Provider, Updated Care Team, & Informed Pt to Notify Payor if Applicable           Medication Adherence /    Statin Use []  Primary Care Appointment Scheduled    []  Patient Reminded to  Prescription    []  Patient Declined, Provider Notified if Needed    []  Sent Provider Message to Review to Evaluate Pt for Statin, Add Exclusion Dx Codes, Document   Exclusion in Problem List, Change Statin Intensity Level to Moderate or High Intensity if Applicable                Osteoporosis Screening []  Dexa Order Placed    []  Dexa Appointment Scheduled    []  External Records Requested & Care Team Updated    []  External Records Uploaded, Care Team Updated, & History Updated if Applicable    []  Patient Declined Scheduling Dexa or Will Call Back to Schedule    []  Patient Will Schedule  with External Provider / Order Routed & Care Team Updated if Applicable       Additional Notes:.  Post visit Population Health review of encounter with date of service  3/26/25 with Aneesh. Not  All required CM[facility]  components in encounter.  Needs dx as htn/chol message sent.  Followup appt for: 6/17/25 HY

## 2025-03-27 NOTE — PROGRESS NOTES
Cholesterol improved. Continue low fat/low cholesterol diet and increase exercise as tolerated. She could also take Fish Oil or CoQ10 to help lower cholesterol.

## 2025-04-09 DIAGNOSIS — J45.20 MILD INTERMITTENT ASTHMA, UNSPECIFIED WHETHER COMPLICATED: Primary | ICD-10-CM

## 2025-04-09 NOTE — TELEPHONE ENCOUNTER
Received refill request for nebulizer solution. Left message for patient to call clinic to clarify this is the medication she needs refilled. She also has albuterol inhaler listed.

## 2025-04-13 RX ORDER — ALBUTEROL SULFATE 0.83 MG/ML
2.5 SOLUTION RESPIRATORY (INHALATION)
Qty: 180 ML | Refills: 11 | Status: SHIPPED | OUTPATIENT
Start: 2025-04-13

## 2025-04-17 ENCOUNTER — TELEPHONE (OUTPATIENT)
Dept: CARDIOLOGY | Facility: CLINIC | Age: 30
End: 2025-04-17
Payer: COMMERCIAL

## 2025-04-17 ENCOUNTER — RESULTS FOLLOW-UP (OUTPATIENT)
Dept: CARDIOLOGY | Facility: HOSPITAL | Age: 30
End: 2025-04-17

## 2025-04-17 DIAGNOSIS — M25.859: Primary | ICD-10-CM

## 2025-04-17 NOTE — TELEPHONE ENCOUNTER
Attempted to contact concerning below msg. Not available. Will reach out via [email or web address].    ----- Message from Richard Avendaño MD sent at 4/17/2025 12:42 PM CDT -----  Heart monitor is normal  ----- Message -----  From: Richard Avendaño MD  Sent: 4/7/2025   2:10 PM CDT  To: Richard Avendaño MD

## 2025-04-24 DIAGNOSIS — Z79.890 HORMONE REPLACEMENT THERAPY: ICD-10-CM

## 2025-04-24 RX ORDER — ESTRADIOL 2 MG/1
2 TABLET ORAL 2 TIMES DAILY
Qty: 60 TABLET | Refills: 2 | Status: SHIPPED | OUTPATIENT
Start: 2025-04-24

## 2025-04-28 ENCOUNTER — OFFICE VISIT (OUTPATIENT)
Dept: ORTHOPEDICS | Facility: CLINIC | Age: 30
End: 2025-04-28
Payer: COMMERCIAL

## 2025-04-28 ENCOUNTER — HOSPITAL ENCOUNTER (OUTPATIENT)
Dept: RADIOLOGY | Facility: HOSPITAL | Age: 30
Discharge: HOME | End: 2025-04-28
Attending: NURSE PRACTITIONER
Payer: COMMERCIAL

## 2025-04-28 VITALS
RESPIRATION RATE: 18 BRPM | SYSTOLIC BLOOD PRESSURE: 137 MMHG | HEART RATE: 106 BPM | OXYGEN SATURATION: 98 % | BODY MASS INDEX: 40.14 KG/M2 | HEIGHT: 68 IN | DIASTOLIC BLOOD PRESSURE: 74 MMHG

## 2025-04-28 DIAGNOSIS — M70.62 GREATER TROCHANTERIC BURSITIS OF BOTH HIPS: Primary | ICD-10-CM

## 2025-04-28 DIAGNOSIS — M25.859: ICD-10-CM

## 2025-04-28 DIAGNOSIS — M25.551 BILATERAL HIP PAIN: ICD-10-CM

## 2025-04-28 DIAGNOSIS — M70.61 GREATER TROCHANTERIC BURSITIS OF BOTH HIPS: Primary | ICD-10-CM

## 2025-04-28 DIAGNOSIS — M25.552 BILATERAL HIP PAIN: ICD-10-CM

## 2025-04-28 DIAGNOSIS — K21.9 GASTROESOPHAGEAL REFLUX DISEASE WITHOUT ESOPHAGITIS: ICD-10-CM

## 2025-04-28 PROCEDURE — 3008F BODY MASS INDEX DOCD: CPT | Mod: ,,, | Performed by: NURSE PRACTITIONER

## 2025-04-28 PROCEDURE — 99999PBSHW PR PBB SHADOW TECHNICAL ONLY FILED TO HB: Mod: PBBFAC,,,

## 2025-04-28 PROCEDURE — 1159F MED LIST DOCD IN RCRD: CPT | Mod: ,,, | Performed by: NURSE PRACTITIONER

## 2025-04-28 PROCEDURE — 73522 X-RAY EXAM HIPS BI 3-4 VIEWS: CPT | Mod: TC

## 2025-04-28 PROCEDURE — 99999 PR PBB SHADOW E&M-EST. PATIENT-LVL V: CPT | Mod: PBBFAC,,, | Performed by: NURSE PRACTITIONER

## 2025-04-28 PROCEDURE — 3075F SYST BP GE 130 - 139MM HG: CPT | Mod: ,,, | Performed by: NURSE PRACTITIONER

## 2025-04-28 PROCEDURE — 20610 DRAIN/INJ JOINT/BURSA W/O US: CPT | Mod: PBBFAC,RT | Performed by: NURSE PRACTITIONER

## 2025-04-28 PROCEDURE — 20610 DRAIN/INJ JOINT/BURSA W/O US: CPT | Mod: PBBFAC,LT | Performed by: NURSE PRACTITIONER

## 2025-04-28 PROCEDURE — 99215 OFFICE O/P EST HI 40 MIN: CPT | Mod: PBBFAC,25 | Performed by: NURSE PRACTITIONER

## 2025-04-28 PROCEDURE — 99204 OFFICE O/P NEW MOD 45 MIN: CPT | Mod: S$PBB,25,, | Performed by: NURSE PRACTITIONER

## 2025-04-28 PROCEDURE — 3078F DIAST BP <80 MM HG: CPT | Mod: ,,, | Performed by: NURSE PRACTITIONER

## 2025-04-28 RX ORDER — NAPROXEN 500 MG/1
500 TABLET ORAL 2 TIMES DAILY WITH MEALS
Qty: 30 TABLET | Refills: 0 | Status: SHIPPED | OUTPATIENT
Start: 2025-04-28

## 2025-04-28 RX ORDER — OMEPRAZOLE 40 MG/1
40 CAPSULE, DELAYED RELEASE ORAL EVERY MORNING
Qty: 90 CAPSULE | Refills: 1 | Status: SHIPPED | OUTPATIENT
Start: 2025-04-28 | End: 2026-04-28

## 2025-04-28 RX ADMIN — TRIAMCINOLONE ACETONIDE 40 MG: 40 INJECTION, SUSPENSION INTRA-ARTICULAR; INTRAMUSCULAR at 09:04

## 2025-04-28 NOTE — PROGRESS NOTES
ASSESSMENT:      ICD-10-CM ICD-9-CM   1. Greater trochanteric bursitis of both hips  M70.61 726.5    M70.62    2. Impingement syndrome, hip  M25.859 726.5   3. Bilateral hip pain  M25.551 719.45    M25.552        PLAN:     -Findings and treatment options were discussed with the patient  -All questions answered  Natural history and expected course discussed. Questions answered.  Educational materials distributed.  NSAIDs per medication orders.  Steroid injection for trochanteric bursitis. See procedure note.  Bilateral greater troch injections today  Naproxen PO BID    There are no Patient Instructions on file for this visit.    IMAGING:  X-Ray Hip 3 or 4 views Bilateral  Result Date: 5/6/2025  EXAMINATION: XR HIP 3 OR 4 VIEWS BILATERAL CLINICAL HISTORY: Pain in right hip TECHNIQUE: AP pelvis, AP and frogleg lateral view right hip, AP and frogleg lateral view left hip COMPARISON: None FINDINGS: No acute fracture or dislocation.  Degenerative changes right hip with spurring of the superior acetabular rim laterally, and the right hip joint appearing mildly narrowed.     Above Electronically signed by: Nimisha Skelton MD Date:    05/06/2025 Time:    16:56           CC: Hip pain  29 y.o. Female who presents as a new patient to me for evaluation of bilateral hip pain with the right being worse.    Pain has been present for almost 2 years, off and on. She has pain on the lateral side of both hips.  We tender to touch.  Painful to lay on either side.  Pain runs down her leg to her knee.  She has had no treatment.  She has also had a history of midline low back pain.  She reports it is painful to lay on her sides  Mechanism of injury: No specific injury  Location of the pain: lateral  Occupation:   Mechanical symptoms, such as catching and popping of the hip are present from time to time according to the patient.    Symptoms are worsened with activity.  Better with rest. Treatment thus far has included rest, activity  modifications, and oral medications.    she has not  had formal physical therapy  she has not had previous advanced imaging such as MRI.   she has not  had previous hip injections.   she has not  had previous hip or back surgery.   Here today to discuss diagnosis and treatment options.        Review of Systems  All other review of symptoms were reviewed and found to be noncontributory.     PAST MEDICAL HISTORY:   Past Medical History:   Diagnosis Date    Cardiac murmur     Endometriosis     GERD (gastroesophageal reflux disease)     History of bee sting allergy     Migraine headache 11/13/2019    PCOS (polycystic ovarian syndrome)     Periumbilical hernia 08/20/2020    repaired by Dr. Dwyer on 10/07/2020    PONV (postoperative nausea and vomiting)     Retroflexion of uterus 07/31/2018    3rd degree       PAST SURGICAL HISTORY:   Past Surgical History:   Procedure Laterality Date    CHOLECYSTECTOMY  3-2022    Cyst removed from cervix      Cyst removed from ovary      DILATION AND CURETTAGE OF UTERUS      HERNIA REPAIR      HYSTERECTOMY      LAPAROSCOPIC CHOLECYSTECTOMY N/A 03/09/2022    Procedure: CHOLECYSTECTOMY, LAPAROSCOPIC;  Surgeon: Denny Dwyer MD;  Location: [facility];  Service: General;  Laterality: N/A;    LOOP ELECTROSURGICAL EXCISION PROCEDURE (LEEP)      Operative Laparoscopy with lysis of adhesions      RH/BSO with limited cystoscopy  09/24/2018    TONSILLECTOMY  2018       FAMILY HISTORY:   Family History   Problem Relation Name Age of Onset    Breast cancer Mother      Fibromyalgia Mother      No Known Problems Father      Asthma Sister      Asthma Sister      Asthma Brother      Asthma Brother      No Known Problems Brother      No Known Problems Brother      No Known Problems Brother      Breast cancer Other      Diabetes Other         SOCIAL HISTORY:   Social History[1]    MEDICATIONS:   Current Medications[2]    ALLERGIES:   Review of patient's allergies indicates:   Allergen Reactions     "Adhesive      Adhesive on hormone patch    Allergen ext-venom-honey bee Other (See Comments)    Insect venom      Bee sting        PHYSICAL EXAMINATION:  /74 (BP Location: Left arm, Patient Position: Sitting)   Pulse 106   Resp 18   Ht 5' 8" (1.727 m)   LMP  (LMP Unknown)   SpO2 98%   BMI 40.14 kg/m²               Right Hip Exam     Inspection   Swelling: absent  Bruising: absent    Tenderness   The patient tender to palpation of the trochanteric bursa.    Crepitus   The patient has crepitus of the trochanteric bursa.    Range of Motion   Extension:  normal   Flexion:  normal   External rotation:  normal   Internal rotation:  normal     Tests   Pain w/ forced internal rotation (JANET): absent  Pain w/ forced external rotation (FADIR): absent  Log Roll: negative  Left Hip Exam     Inspection   Swelling: absent  Bruising: absent    Tenderness   The patient tender to palpation of the trochanteric bursa.    Range of Motion   Extension:  normal   Flexion:  normal   External rotation:  normal   Internal rotation: normal     Tests   Pain w/ forced internal rotation (JANET): absent  Pain w/ forced external rotation (FADIR): absent  Log Roll: negative          Vascular Exam     Right Pulses  Dorsalis Pedis:      2+          Left Pulses  Dorsalis Pedis:      2+            Orders Placed This Encounter   Procedures    Large Joint Aspiration/Injection: L greater trochanteric bursa     This order was created via procedure documentation    Large Joint Aspiration/Injection: R greater trochanteric bursa     This order was created via procedure documentation    X-Ray Hip 3 or 4 views Bilateral     Standing Status:   Future     Number of Occurrences:   1     Expected Date:   4/28/2025     Expiration Date:   4/28/2026     May the Radiologist modify the order per protocol to meet the clinical needs of the patient?:   Yes     Release to patient:   Immediate     Large Joint Aspiration/Injection: L greater trochanteric " bursa    Date/Time: 4/28/2025 9:20 AM    Performed by: Mikaela Odell FNP  Authorized by: Mikaela Odell FNP    Consent Done?:  Yes (Verbal)  Indications:  Pain  Local anesthetic:  Bupivacaine 0.25% without epinephrine    Details:  Needle Size:  22 G  Approach:  Lateral  Location:  Hip  Site:  L greater trochanteric bursa  Medications:  40 mg triamcinolone acetonide 40 mg/mL  Patient tolerance:  Patient tolerated the procedure well with no immediate complications  Large Joint Aspiration/Injection: R greater trochanteric bursa    Date/Time: 4/28/2025 9:20 AM    Performed by: Mikaela Odell FNP  Authorized by: Mikaela Odell FNP    Consent Done?:  Yes (Verbal)  Indications:  Pain  Local anesthetic:  Bupivacaine 0.25% without epinephrine  Anesthetic total (ml):  4      Details:  Needle Size:  22 G  Approach:  Lateral  Location:  Hip  Site:  R greater trochanteric bursa  Medications:  40 mg triamcinolone acetonide 40 mg/mL  Patient tolerance:  Patient tolerated the procedure well with no immediate complications             [1]   Social History  Socioeconomic History    Marital status:     Number of children: 0   Tobacco Use    Smoking status: Never     Passive exposure: Current    Smokeless tobacco: Never   Substance and Sexual Activity    Alcohol use: Yes     Alcohol/week: 4.0 standard drinks of alcohol     Types: 2 Glasses of wine, 2 Shots of liquor per week    Drug use: Never    Sexual activity: Yes     Partners: Male     Birth control/protection: See Surgical Hx   Social History Narrative    Lives at home with .     Social Drivers of Health     Financial Resource Strain: Low Risk  (2/17/2025)    Overall Financial Resource Strain (CARDIA)     Difficulty of Paying Living Expenses: Not hard at all   Food Insecurity: No Food Insecurity (2/17/2025)    Hunger Vital Sign     Worried About Running Out of Food in the Last Year: Never true     Ran Out of Food in the Last Year: Never true    Transportation Needs: No Transportation Needs (2/17/2025)    PRAPARE - Transportation     Lack of Transportation (Medical): No     Lack of Transportation (Non-Medical): No   Physical Activity: Inactive (2/17/2025)    Exercise Vital Sign     Days of Exercise per Week: 0 days     Minutes of Exercise per Session: 0 min   Stress: No Stress Concern Present (2/17/2025)    [employer] - Occupational Stress Questionnaire     Feeling of Stress : Not at all   Housing Stability: Low Risk  (2/17/2025)    Housing Stability Vital Sign     Unable to Pay for Housing in the Last Year: No     Number of Times Moved in the Last Year: 0     Homeless in the Last Year: No   [2]   Current Outpatient Medications:     albuterol (PROVENTIL) 2.5 mg /3 mL (0.083 %) nebulizer solution, Take 3 mLs (2.5 mg total) by nebulization every 4 to 6 hours as needed for Wheezing., Disp: 180 mL, Rfl: 11    albuterol (PROVENTIL/VENTOLIN HFA) 90 mcg/actuation inhaler, Inhale 1-2 puffs into the lungs every 6 (six) hours as needed for Wheezing. Rescue, Disp: 18 g, Rfl: 1    BOTOX 200 unit SolR, Inject 155 Units into the skin every 3 (three) months., Disp: , Rfl:     cyanocobalamin 1,000 mcg/mL injection, Inject 1 mL (1,000 mcg total) into the muscle every 30 days., Disp: 1 mL, Rfl: 5    estradioL (ESTRACE) 2 MG tablet, Take 1 tablet (2 mg total) by mouth 2 (two) times daily., Disp: 60 tablet, Rfl: 2    fluticasone-salmeterol diskus inhaler 250-50 mcg, Inhale 1 puff into the lungs daily as needed (wheezing). Controller, Disp: 1 each, Rfl: 3    gabapentin (NEURONTIN) 300 MG capsule, Take 1 capsule (300 mg total) by mouth 3 (three) times daily., Disp: 90 capsule, Rfl: 3    hydroCHLOROthiazide (HYDRODIURIL) 25 MG tablet, Take 25 mg by mouth once daily., Disp: , Rfl:     hyoscyamine 0.125 mg Subl, Place 1 tablet (0.125 mg total) under the tongue every 4 (four) hours as needed (stomach cramp)., Disp: 30 tablet, Rfl: 0    metaxalone  (SKELAXIN) 800 MG tablet, Take 800 mg by mouth 2 (two) times daily as needed., Disp: , Rfl:     ondansetron (ZOFRAN-ODT) 8 MG TbDL, Take 1 tablet (8 mg total) by mouth every 6 (six) hours as needed (nausea)., Disp: 30 tablet, Rfl: 1    promethazine (PHENERGAN) 12.5 MG Tab, Take 12.5 mg by mouth 4 (four) times daily as needed., Disp: , Rfl:     scopolamine (TRANSDERM-SCOP) 1.3-1.5 mg (1 mg over 3 days), Place 1 patch onto the skin Every 3 (three) days., Disp: , Rfl:     triamcinolone acetonide 0.1% (KENALOG) 0.1 % ointment, Apply to affected area 2 TIMES A DAY, NO LONGER THEN 1 WEEK, Disp: 454 g, Rfl: 0    ZEMBRACE SYMTOUCH 3 mg/0.5 mL PnIj, INJECT 3MG UNDER THE SKIN AT ONSET OF MIGRAINE;MAY REPEAT DOSE IF NEEDED AFTER 1 HOUR. MAX OF 4 INJECTIONS IN 24 HOURS, Disp: , Rfl:     aspirin (ECOTRIN) 81 MG EC tablet, Take 81 mg by mouth once daily., Disp: , Rfl:     brexpiprazole (REXULTI) 2 mg Tab, Take 1 tablet (2 mg total) by mouth once daily., Disp: 30 tablet, Rfl: 5    desvenlafaxine succinate (PRISTIQ) 50 MG Tb24, Take 1 tablet (50 mg total) by mouth once daily., Disp: 90 tablet, Rfl: 1    EPINEPHrine (EPIPEN) 0.3 mg/0.3 mL AtIn, Inject 0.3 mLs (0.3 mg total) into the muscle once. for 1 dose, Disp: 0.3 mL, Rfl: 0    naproxen (NAPROSYN) 500 MG tablet, Take 1 tablet (500 mg total) by mouth 2 (two) times daily with meals., Disp: 30 tablet, Rfl: 0    omeprazole (PRILOSEC) 40 MG capsule, Take 1 capsule (40 mg total) by mouth every morning., Disp: 90 capsule, Rfl: 1

## 2025-05-05 DIAGNOSIS — Z91.038 ALLERGY TO INSECT VENOM: Primary | ICD-10-CM

## 2025-05-06 RX ORDER — EPINEPHRINE 0.3 MG/.3ML
1 INJECTION SUBCUTANEOUS ONCE
Qty: 0.3 ML | Refills: 0 | Status: SHIPPED | OUTPATIENT
Start: 2025-05-06 | End: 2025-05-06

## 2025-05-07 RX ORDER — TRIAMCINOLONE ACETONIDE 40 MG/ML
40 INJECTION, SUSPENSION INTRA-ARTICULAR; INTRAMUSCULAR
Status: DISCONTINUED | OUTPATIENT
Start: 2025-04-28 | End: 2025-05-07 | Stop reason: HOSPADM

## 2025-05-09 DIAGNOSIS — E53.8 VITAMIN B12 DEFICIENCY: ICD-10-CM

## 2025-05-12 RX ORDER — GABAPENTIN 300 MG/1
300 CAPSULE ORAL 3 TIMES DAILY
Qty: 90 CAPSULE | Refills: 3 | Status: SHIPPED | OUTPATIENT
Start: 2025-05-12

## 2025-05-12 RX ORDER — CYANOCOBALAMIN 1000 UG/ML
1000 INJECTION, SOLUTION INTRAMUSCULAR; SUBCUTANEOUS
Qty: 1 ML | Refills: 5 | Status: SHIPPED | OUTPATIENT
Start: 2025-05-12

## 2025-06-18 ENCOUNTER — OFFICE VISIT (OUTPATIENT)
Dept: FAMILY MEDICINE | Facility: CLINIC | Age: 30
End: 2025-06-18
Payer: COMMERCIAL

## 2025-06-18 ENCOUNTER — RESULTS FOLLOW-UP (OUTPATIENT)
Dept: FAMILY MEDICINE | Facility: CLINIC | Age: 30
End: 2025-06-18
Payer: COMMERCIAL

## 2025-06-18 VITALS
SYSTOLIC BLOOD PRESSURE: 108 MMHG | TEMPERATURE: 99 F | OXYGEN SATURATION: 98 % | WEIGHT: 261.81 LBS | BODY MASS INDEX: 39.68 KG/M2 | HEIGHT: 68 IN | HEART RATE: 90 BPM | DIASTOLIC BLOOD PRESSURE: 76 MMHG | RESPIRATION RATE: 24 BRPM

## 2025-06-18 DIAGNOSIS — R10.9 ABDOMINAL PAIN, UNSPECIFIED ABDOMINAL LOCATION: ICD-10-CM

## 2025-06-18 DIAGNOSIS — R30.0 DYSURIA: ICD-10-CM

## 2025-06-18 DIAGNOSIS — Z00.01 ENCOUNTER FOR GENERAL ADULT MEDICAL EXAMINATION WITH ABNORMAL FINDINGS: Primary | ICD-10-CM

## 2025-06-18 DIAGNOSIS — Z79.890 HORMONE REPLACEMENT THERAPY: ICD-10-CM

## 2025-06-18 DIAGNOSIS — Z13.1 SCREENING FOR DIABETES MELLITUS: ICD-10-CM

## 2025-06-18 DIAGNOSIS — Z13.220 SCREENING FOR LIPID DISORDERS: ICD-10-CM

## 2025-06-18 DIAGNOSIS — K21.9 GASTROESOPHAGEAL REFLUX DISEASE WITHOUT ESOPHAGITIS: ICD-10-CM

## 2025-06-18 DIAGNOSIS — E55.9 VITAMIN D DEFICIENCY: Primary | ICD-10-CM

## 2025-06-18 DIAGNOSIS — E55.9 VITAMIN D DEFICIENCY: ICD-10-CM

## 2025-06-18 DIAGNOSIS — R10.31 RIGHT LOWER QUADRANT ABDOMINAL PAIN: ICD-10-CM

## 2025-06-18 DIAGNOSIS — E53.8 VITAMIN B12 DEFICIENCY: ICD-10-CM

## 2025-06-18 PROBLEM — M94.0 COSTOCHONDRITIS: Status: RESOLVED | Noted: 2023-12-25 | Resolved: 2025-06-18

## 2025-06-18 LAB
25(OH)D3 SERPL-MCNC: 35 NG/ML (ref 30–80)
BILIRUB SERPL-MCNC: NORMAL MG/DL
BLOOD URINE, POC: NORMAL
CHOLEST SERPL-MCNC: 192 MG/DL
CHOLEST/HDLC SERPL: 4 {RATIO}
CLARITY, UA: CLEAR
COLOR, UA: NORMAL
EST. AVERAGE GLUCOSE BLD GHB EST-MCNC: 111 MG/DL
FOLATE SERPL-MCNC: 9.7 NG/ML (ref 7–31.4)
GLUCOSE SERPL-MCNC: 88 MG/DL (ref 70–100)
GLUCOSE UR QL STRIP: NORMAL
HBA1C MFR BLD HPLC: 5.5 %
HDLC SERPL-MCNC: 48 MG/DL (ref 35–60)
KETONES UR QL STRIP: NORMAL
LDLC SERPL CALC-MCNC: 125 MG/DL
LDLC/HDLC SERPL: 2.6 {RATIO}
LEUKOCYTE ESTERASE URINE, POC: NORMAL
NITRITE, POC UA: NORMAL
NONHDLC SERPL-MCNC: 144 MG/DL
PH, POC UA: 5.5
PROTEIN, POC: NORMAL
SPECIFIC GRAVITY, POC UA: 1
TRIGL SERPL-MCNC: 96 MG/DL (ref 37–140)
UROBILINOGEN, POC UA: 0.2
VIT B12 SERPL-MCNC: 1040 PG/ML (ref 213–816)
VLDLC SERPL-MCNC: 19 MG/DL

## 2025-06-18 PROCEDURE — 82306 VITAMIN D 25 HYDROXY: CPT | Mod: ,,, | Performed by: CLINICAL MEDICAL LABORATORY

## 2025-06-18 PROCEDURE — 82607 VITAMIN B-12: CPT | Mod: ,,, | Performed by: CLINICAL MEDICAL LABORATORY

## 2025-06-18 PROCEDURE — 99395 PREV VISIT EST AGE 18-39: CPT | Mod: ,,, | Performed by: NURSE PRACTITIONER

## 2025-06-18 PROCEDURE — 3008F BODY MASS INDEX DOCD: CPT | Mod: ,,, | Performed by: NURSE PRACTITIONER

## 2025-06-18 PROCEDURE — 82746 ASSAY OF FOLIC ACID SERUM: CPT | Mod: ,,, | Performed by: CLINICAL MEDICAL LABORATORY

## 2025-06-18 PROCEDURE — 82947 ASSAY GLUCOSE BLOOD QUANT: CPT | Mod: ,,, | Performed by: CLINICAL MEDICAL LABORATORY

## 2025-06-18 PROCEDURE — 1160F RVW MEDS BY RX/DR IN RCRD: CPT | Mod: ,,, | Performed by: NURSE PRACTITIONER

## 2025-06-18 PROCEDURE — 1159F MED LIST DOCD IN RCRD: CPT | Mod: ,,, | Performed by: NURSE PRACTITIONER

## 2025-06-18 PROCEDURE — 80061 LIPID PANEL: CPT | Mod: ,,, | Performed by: CLINICAL MEDICAL LABORATORY

## 2025-06-18 PROCEDURE — 3074F SYST BP LT 130 MM HG: CPT | Mod: ,,, | Performed by: NURSE PRACTITIONER

## 2025-06-18 PROCEDURE — 83036 HEMOGLOBIN GLYCOSYLATED A1C: CPT | Mod: ,,, | Performed by: CLINICAL MEDICAL LABORATORY

## 2025-06-18 PROCEDURE — 3078F DIAST BP <80 MM HG: CPT | Mod: ,,, | Performed by: NURSE PRACTITIONER

## 2025-06-18 RX ORDER — SUCRALFATE 1 G/1
1 TABLET ORAL
Qty: 120 TABLET | Refills: 0 | Status: SHIPPED | OUTPATIENT
Start: 2025-06-18

## 2025-06-18 RX ORDER — CYANOCOBALAMIN 1000 UG/ML
1000 INJECTION, SOLUTION INTRAMUSCULAR; SUBCUTANEOUS
Qty: 1 ML | Refills: 5 | Status: SHIPPED | OUTPATIENT
Start: 2025-06-18

## 2025-06-18 RX ORDER — ESTRADIOL 2 MG/1
2 TABLET ORAL 2 TIMES DAILY
Qty: 60 TABLET | Refills: 2 | Status: SHIPPED | OUTPATIENT
Start: 2025-06-18

## 2025-06-18 RX ORDER — OMEPRAZOLE 40 MG/1
40 CAPSULE, DELAYED RELEASE ORAL EVERY MORNING
Qty: 90 CAPSULE | Refills: 1 | Status: SHIPPED | OUTPATIENT
Start: 2025-06-18 | End: 2026-06-18

## 2025-06-18 NOTE — PROGRESS NOTES
"Subjective     Patient ID: Yeny Beaver is a 29 y.o. female.    Chief Complaint: Healthy You (Patient is a 29 year old female who presents to the clinic related to Annual Healthy You visit.  Patient is fasting this morning for lab work. Also needs Vitamin D levels rechecked, rheumatologist added Vitamin D, she is out. ) and Abdominal Pain (Patient reports right lower abdominal pain x 2 weeks, describes as aching pain today, but at times sharp stabbing pain, 9/10.  Patient has had some dysuria, drank cranberry juice, "went away" )    29 year old female presents to clinic for Healthy You visit. Patient is fasting this morning for lab work. Also needs Vitamin D levels rechecked, rheumatologist added Vitamin D, she is out.   Abdominal Pain: Patient reports right lower abdominal pain x 2 weeks, describes as aching pain today, but at times sharp stabbing pain, 9/10.  Patient has had some dysuria, drank cranberry juice, "went away." UA obtained today and was clear. Of note, patient has had pain in RLQ off and on for about a year now. She was seen at GI at [facility] and had Colonoscopy as well as gastric emptying study which were unremarkable. States she took some of her 's carafate and it did help the pain. She reports at times she has reflux. States pain is not really in relation to food but can tell it is worse if she drinks alcohol. Denies any epigastric pain. She is requesting [facility] to [facility] for further eval.           Review of Systems   Constitutional:  Negative for activity change, appetite change, fatigue and fever.   HENT:  Negative for nasal congestion, ear pain, rhinorrhea, sinus pressure/congestion and sore throat.    Eyes:  Negative for pain, redness, visual disturbance and eye dryness.   Respiratory:  Negative for cough and shortness of breath.    Cardiovascular:  Negative for chest pain and leg swelling.   Gastrointestinal:  Positive for abdominal pain. Negative for abdominal distention, " constipation and diarrhea.   Endocrine: Negative for cold intolerance, heat intolerance and polyuria.   Genitourinary:  Negative for bladder incontinence, dysuria, frequency and urgency.   Musculoskeletal:  Negative for arthralgias, gait problem and myalgias.   Integumentary:  Negative for color change, rash and wound.   Allergic/Immunologic: Negative for environmental allergies and food allergies.   Neurological:  Negative for dizziness, weakness, light-headedness and headaches.   Psychiatric/Behavioral:  Negative for behavioral problems and sleep disturbance.        Tobacco Use: Low Risk  (6/18/2025)    Patient History     Smoking Tobacco Use: Never     Smokeless Tobacco Use: Never     Passive Exposure: Past   Recent Concern: Tobacco Use - Medium Risk (4/28/2025)    Patient History     Smoking Tobacco Use: Never     Smokeless Tobacco Use: Never     Passive Exposure: Current     Review of patient's allergies indicates:   Allergen Reactions    Adhesive      Adhesive on hormone patch    Allergen ext-venom-honey bee Other (See Comments)    Insect venom      Bee sting     Current Outpatient Medications   Medication Instructions    albuterol (PROVENTIL) 2.5 mg, Nebulization, Every 4-6 hours PRN    albuterol (PROVENTIL/VENTOLIN HFA) 90 mcg/actuation inhaler 1-2 puffs, Inhalation, Every 6 hours PRN, Rescue    BOTOX 155 Units, Every 3 months    cyanocobalamin 1,000 mcg, Intramuscular, Every 30 days    EPINEPHrine (EPIPEN) 0.3 mg, Intramuscular, Once    estradioL (ESTRACE) 2 mg, Oral, 2 times daily    fluticasone-salmeterol diskus inhaler 250-50 mcg 1 puff, Inhalation, Daily PRN, Controller    gabapentin (NEURONTIN) 300 mg, Oral, 3 times daily    hyoscyamine 0.125 mg, Sublingual, Every 4 hours PRN    omeprazole (PRILOSEC) 40 mg, Oral, Every morning    ondansetron (ZOFRAN-ODT) 8 mg, Oral, Every 6 hours PRN    promethazine (PHENERGAN) 12.5 mg, 4 times daily PRN    scopolamine (TRANSDERM-SCOP) 1.3-1.5 mg (1 mg over 3 days) 1  patch, Every 3 days    sucralfate (CARAFATE) 1 g, Oral, Before meals & nightly    triamcinolone acetonide 0.1% (KENALOG) 0.1 % ointment Apply to affected area 2 TIMES A DAY, NO LONGER THEN 1 WEEK    ZEMBRACE SYMTOUCH 3 mg/0.5 mL PnIj INJECT 3MG UNDER THE SKIN AT ONSET OF MIGRAINE;MAY REPEAT DOSE IF NEEDED AFTER 1 HOUR. MAX OF 4 INJECTIONS IN 24 HOURS     Medications Discontinued During This Encounter   Medication Reason    desvenlafaxine succinate (PRISTIQ) 50 MG Tb24 Patient no longer taking    brexpiprazole (REXULTI) 2 mg Tab Patient no longer taking    hydroCHLOROthiazide (HYDRODIURIL) 25 MG tablet Patient no longer taking    naproxen (NAPROSYN) 500 MG tablet Patient no longer taking    aspirin (ECOTRIN) 81 MG EC tablet Patient no longer taking    metaxalone (SKELAXIN) 800 MG tablet Patient no longer taking    estradioL (ESTRACE) 2 MG tablet Reorder    omeprazole (PRILOSEC) 40 MG capsule Reorder    cyanocobalamin 1,000 mcg/mL injection Reorder       Past Medical History:   Diagnosis Date    Cardiac murmur     Endometriosis     GERD (gastroesophageal reflux disease)     History of bee sting allergy     Migraine headache 11/13/2019    PCOS (polycystic ovarian syndrome)     Periumbilical hernia 08/20/2020    repaired by Dr. Dwyer on 10/07/2020    PONV (postoperative nausea and vomiting)     Retroflexion of uterus 07/31/2018    3rd degree     Health Maintenance Topics with due status: Not Due       Topic Last Completion Date    TETANUS VACCINE 08/31/2019    Pneumococcal Vaccines (Age 0-49) 10/13/2022    RSV Vaccine (Age 60+ and Pregnant patients) Not Due     Immunization History   Administered Date(s) Administered    COVID-19, MRNA, LN-S, PF (Pfizer) (Purple Cap) 03/17/2021    DTaP 02/16/1998, 07/14/1998, 09/18/1998, 03/26/2001, 07/23/2002    HPV Quadrivalent 07/09/2012, 05/22/2015, 08/14/2015    Hepatitis A, Pediatric/Adolescent, 2 Dose 08/01/2008, 07/09/2012    Hepatitis B, Pediatric/Adolescent 1997,  "03/11/1998, 09/18/1998    HiB PRP-T 1997, 03/11/1998, 09/18/1998    IPV 02/16/1998, 07/14/1998, 09/18/1998, 07/23/2002    Influenza 10/26/2015, 08/24/2017, 10/13/2018, 08/30/2019, 10/19/2021, 10/08/2022    Influenza - Trivalent - Fluarix, Flulaval, Fluzone, Afluria - PF 09/02/2020    MMR 03/26/2001, 07/23/2002, 08/24/2017    Meningococcal Conjugate (MCV4P) 07/09/2009, 05/22/2015, 08/24/2017    Pneumococcal Conjugate - 13 Valent 10/26/2015    Pneumococcal Conjugate - 20 Valent 10/13/2022    Pneumococcal Polysaccharide - 23 Valent 08/25/2017    Tdap 07/09/2009, 08/31/2019    Varicella 07/23/2002, 08/01/2008       Objective     Body mass index is 39.81 kg/m².  Wt Readings from Last 3 Encounters:   06/18/25 118.8 kg (261 lb 12.8 oz)   03/26/25 119.7 kg (264 lb)   02/24/25 125.6 kg (277 lb)     Ht Readings from Last 3 Encounters:   06/18/25 5' 8" (1.727 m)   04/28/25 5' 8" (1.727 m)   03/26/25 5' 8" (1.727 m)     BP Readings from Last 3 Encounters:   06/18/25 108/76   04/28/25 137/74   03/26/25 110/78     Temp Readings from Last 3 Encounters:   06/18/25 98.5 °F (36.9 °C) (Oral)   03/26/25 98.2 °F (36.8 °C) (Oral)   02/18/25 98.7 °F (37.1 °C) (Oral)     Pulse Readings from Last 3 Encounters:   06/18/25 90   04/28/25 106   03/26/25 78     Resp Readings from Last 3 Encounters:   06/18/25 (!) 24   04/28/25 18   03/26/25 19     PF Readings from Last 3 Encounters:   No data found for PF       Physical Exam  Vitals and nursing note reviewed.   Constitutional:       Appearance: She is obese.   HENT:      Head: Normocephalic.      Right Ear: Tympanic membrane normal.      Nose: Nose normal.      Mouth/Throat:      Mouth: Mucous membranes are moist.      Pharynx: Oropharynx is clear. No posterior oropharyngeal erythema.   Eyes:      Conjunctiva/sclera: Conjunctivae normal.   Cardiovascular:      Rate and Rhythm: Normal rate and regular rhythm.      Pulses: Normal pulses.      Heart sounds: Normal heart sounds.   Pulmonary: "      Effort: Pulmonary effort is normal.      Breath sounds: Normal breath sounds.   Abdominal:      General: Bowel sounds are normal. There is no distension.      Palpations: Abdomen is soft.      Tenderness: There is abdominal tenderness in the right lower quadrant.   Musculoskeletal:         General: No swelling or tenderness. Normal range of motion.      Cervical back: Normal range of motion.      Right lower leg: No edema.      Left lower leg: No edema.   Skin:     General: Skin is warm and dry.      Capillary Refill: Capillary refill takes less than 2 seconds.   Neurological:      Mental Status: She is alert. Mental status is at baseline.   Psychiatric:         Mood and Affect: Mood normal.         Behavior: Behavior normal.         Assessment and Plan     Problem List Items Addressed This Visit       Vitamin B12 deficiency    Continue monthly B12 injections. Follow up in 3 months or as needed.          Relevant Medications    cyanocobalamin 1,000 mcg/mL injection    Other Relevant Orders    Vitamin B12 & Folate    Gastroesophageal reflux disease without esophagitis    Relevant Medications    omeprazole (PRILOSEC) 40 MG capsule    Hormone replacement therapy    Relevant Medications    estradioL (ESTRACE) 2 MG tablet    Vitamin D deficiency    Relevant Orders    Vitamin D    Right lower quadrant abdominal pain    UA normal. Requesting referral to GI for further eval. She has Zofran to take at home PRN nausea. She is also asking for prescription of Carafate as she has taken some of her 's and it helped pain.          Relevant Orders    Ambulatory referral/consult to Gastroenterology     Other Visit Diagnoses         Encounter for general adult medical examination with abnormal findings    -  Primary      Screening for lipid disorders        Relevant Orders    Lipid Panel      Screening for diabetes mellitus        Relevant Orders    Hemoglobin A1C    Glucose, Fasting      Abdominal pain, unspecified  abdominal location        Relevant Orders    POCT URINALYSIS W/O SCOPE (Completed)      Dysuria        Relevant Orders    POCT URINALYSIS W/O SCOPE (Completed)            Plan:   Instructed patient to keep appts as scheduled.   Instructed on DASH diet and increased exercise. Recommended at least 150 minutes a week with resistance training as tolerated. Monitor weight and try to lose some.   Instructed on importance of taking all medications as prescribed.   Discussed yearly dental and eye exams.    Discussed use of sun screen, helmets and seat belts.  Gun safety discussed  Sleep discussed  Stay away from tobacco products          I have reviewed the medications, allergies, and problem list.     Goal Actions:    What type of visit is the patient here for today?: Healthy You  Does the patient consent to enroll in [employer] Me Healthy?: Yes  Is this a Wellness Follow Up?: No  What is your overall wellness goal? (select at least one): Improve overall health  Choose 3: Lifestyle, Nutrition, Exercise  Lifestyle Actions : Take medications as prescribed  Nurtrition Actions: Read nutrition labels, Eat a well-balanced diet, drink 8-10 glasses of water per day  Exercise Actions: Recommend physical activity 30 minutes per day 3-5 times/week

## 2025-06-18 NOTE — PATIENT INSTRUCTIONS
"Patient Education     Diet and health   The Basics   Written by the doctors and editors at [facility]   Why is it important to eat a healthy diet? --   It's important to eat a healthy diet because eating the right foods can keep you healthy now and later in life. It can lower the risk of problems like heart disease, diabetes, high blood pressure, and some types of cancer. It can also help you live longer and improve your quality of life.  What kind of diet is best? --   There is no 1 specific diet that experts recommend for everyone. People choose what foods to eat for many different reasons. These include personal preference, culture, Roman [employer], allergies or intolerances, and nutritional goals. People also need to consider the cost and availability of different foods.  In general, experts recommend a diet that:   Includes lots of vegetables, fruits, beans, nuts, and whole grains   Limits red and processed meats, unhealthy fats, sugar, salt, and alcohol  What are dietary patterns? --   A dietary "pattern" means generally eating certain types of foods while limiting others. Some people need to follow a specific dietary pattern because of their health needs. For example, if you have high blood pressure, your doctor might recommend a diet low in salt.  If you are trying to improve your health in general, choosing a healthy dietary pattern can help. This does not have to mean being very strict about what you eat or avoid. The goal is to think about getting plenty of healthy foods while limiting less healthy foods.  Examples of dietary patterns include:   Mediterranean diet - This involves eating a lot of fruits, vegetables, nuts, and whole grains, and uses olive oil instead of other fats. It also includes some fish, poultry, and dairy products, but not a lot of red meat. Following this diet can help your overall health, and might even lower your risk of having a stroke.   Plant-based diets - These patterns focus on " "vegetables, fruits, grains, beans, and nuts. They limit or avoid food that comes from animals, such as meat and dairy. There are different types of plant-based diets, including vegetarian and vegan.   Low-fat diet - A low-fat diet involves limiting calories from fat. This might help some people keep weight off if that is their goal, but it does not have many other health benefits. If you choose to follow a low-fat diet, it is also important to focus on getting lots of whole grains, legumes, fruits, and vegetables. Limit refined grains and sugar.   Low-cholesterol diet - Cholesterol is found in foods with a lot of saturated fat, like red meat, butter, and cheese. A low-cholesterol diet focuses on limiting the amount of cholesterol that you eat. Limiting the cholesterol in your diet can also help lower the amount of unhealthy fats that you eat.  Which foods are especially healthy? --   Foods that are especially healthy include:   Fruits and vegetables - Eating a diet with lots of fruits and vegetables can help prevent heart disease and stroke. It might also help prevent certain types of cancer. Try to eat fruits and vegetables at each meal and also for snacks. If you don't have fresh fruits and vegetables available, you can eat frozen or canned ones instead. Doctors recommend eating at least 5 servings of fruits or vegetables each day.   Whole grains - Whole-grain foods include 100 percent whole-wheat bread, steel cut oats, and whole-grain pasta. These are healthier than foods made with "refined" grains, like white bread and white rice. Eating lots of whole grains instead of refined grains has been shown to help with weight control. It can also lower the risk of several health problems, including colon cancer, heart disease, and diabetes. Doctors recommend that most people try to eat 5 to 8 servings of whole-grain, high-fiber foods each day.   Foods with fiber - Eating foods with a lot of fiber can help prevent heart " "disease and stroke. If you have type 2 diabetes, it can also help control your blood sugar. Foods that have a lot of fiber include vegetables, fruits, beans, nuts, oatmeal, and whole-grain breads and cereals. You can tell how much fiber is in a food by reading the nutrition label (figure 1). Doctors recommend that most people eat about 25 to 34 grams of fiber each day.   Foods with calcium and vitamin D - Babies, children, and adults need calcium and vitamin D to help keep their bones strong. Adults also need calcium and vitamin D to help prevent osteoporosis. Osteoporosis is a condition that causes bones to get "thin" and break more easily than usual. Different foods and drinks have calcium and vitamin D in them (figure 2). People who don't get enough calcium and vitamin D in their diet might need to take a supplement. Doctors recommend that most people have 2 to 3 servings of foods with calcium and vitamin D each day.   Foods with protein - Protein helps your muscles and bones stay strong. Healthy foods with a lot of protein include chicken, fish, eggs, beans, nuts, and soy products. Red meat also has a lot of protein, but it also contains fats, which can be unhealthy. Doctors recommend that most people try to eat about 5 servings of protein each day.   Healthy fats - There are different types of fats. Some types of fats are better for your body than others. Healthy fats are "monounsaturated" or "polyunsaturated" fats. These are found in fatty fish, nuts and nut butters, and avocados. Use plant-based oils when cooking. Examples of these oils include olive, canola, safflower, sunflower, and corn oil. Eating foods with healthy fats, while avoiding or limiting foods with unhealthy fats, might lower the risk of heart disease.   Foods with folate - Folate is a vitamin that is important for pregnant people, since it helps prevent certain birth defects. It is also called "folic acid." Anyone who could get pregnant should " "get at least 400 micrograms of folic acid daily, whether or not they are actively trying to get pregnant. Folate is found in many breakfast cereals, oranges, orange juice, and green leafy vegetables.  What foods should I avoid or limit? --   To eat a healthy diet, there are some things that you should avoid or limit. They include:   Unhealthy fats - "Trans" fats are especially unhealthy. They are found in margarines, many fast foods, and some store-bought baked goods. "Saturated" fats are found in animal products like meats, egg yolks, butter, cheese, and full-fat milk products. Unhealthy fats can raise your cholesterol level and increase your chance of getting heart disease.   Sugar - To have a healthy diet, it's important to limit or avoid added sugar, sweets, and refined grains. Refined grains are found in white bread, white rice, most pastas, and most packaged "snack" foods.  Avoiding sugar-sweetened beverages, like soda and sports drinks, can also help improve your health.  Avoid canned fruits in "heavy" syrup.   Red and processed meats - Studies have shown that eating a lot of red meat can increase your risk of certain health problems, including heart disease and cancer. You should limit the amount of red meat that you eat. This is also true for processed meats like sausage, hot dogs, and stephen.  Can I drink alcohol as part of a healthy diet? --   Not drinking alcohol at all is the healthiest choice. Regular drinking can raise a person's chances of getting liver disease and certain types of cancers. In females, even 1 drink a day can increase the risk of getting breast cancer.  If you do choose to drink, most doctors recommend limiting alcohol to no more than:   1 drink a day for females   2 drinks a day for males  The limits are different because, generally, the female body takes longer to break down alcohol.  How many calories do I need each day? --   Calories give your body energy. The number of calories " "that you need each day depends on your weight, height, age, sex, and how active you are.  Your doctor or nurse can tell you about how many calories you should eat each day. You can also work with a dietitian (nutrition expert) to learn more about your dietary needs and options.  What if I am having trouble improving my diet? --   It can be hard to change the way that you eat. Remember that even small changes can improve your health.  Here are some tips that might help:   Try to make fruits and vegetables part of every meal. If you don't have fresh fruits and vegetables, frozen or canned are good options. Look for products without added salt or sugar.   Keep a bowl of fruit out for snacking.   When you can, choose whole grains instead of refined grains. Choose chicken, fish, and beans instead of red meat and cheese.   Try to eat prepared and processed foods less often.   Try flavored seltzer or water instead of soda or juice.   When eating at fast food restaurants, look for healthier items, like broiled chicken or salad.  If you have questions about which foods you should or should not eat, ask your doctor, nurse, or dietitian. The right diet for you will depend, in part, on your health and any medical conditions you have.  All topics are updated as new evidence becomes available and our peer review process is complete.  This topic retrieved from [employer] on: Jul 13, 2024.  Topic 35165 Version 29.0  Release: 32.6.2 - C32.193  © 2024 UpToDate, Inc. and/or its affiliates. All rights reserved.  figure 1: Nutrition label - Fiber     This is an example of a nutrition label. To figure out how much fiber is in a food, look for the line that says "Dietary Fiber." It's also important to look at the serving size. This food has 7 grams of fiber in each serving, and each serving is 1 cup.  Graphic 12908 Version 8.0  figure 2: Foods and drinks with calcium and vitamin D     Foods rich in calcium include ice cream, soy milk, " "breads, kale, broccoli, milk, cheese, cottage cheese, almonds, yogurt, ready-to-eat cereals, beans, and tofu. Foods rich in vitamin D include milk, fortified plant-based "milks" (soy, almond), canned tuna fish, cod liver oil, yogurt, ready-to-eat-cereals, cooked salmon, canned sardines, mackerel, and eggs. Some of these foods are rich in both.  Graphic 35847 Version 4.0  Consumer Information Use and Disclaimer   Disclaimer: This generalized information is a limited summary of diagnosis, treatment, and/or medication information. It is not meant to be comprehensive and should be used as a tool to help the user understand and/or assess potential diagnostic and treatment options. It does NOT include all information about conditions, treatments, medications, side effects, or risks that may apply to a specific patient. It is not intended to be medical advice or a substitute for the medical advice, diagnosis, or treatment of a health care provider based on the health care provider's examination and assessment of a patient's specific and unique circumstances. Patients must speak with a health care provider for complete information about their health, medical questions, and treatment options, including any risks or benefits regarding use of medications. This information does not endorse any treatments or medications as safe, effective, or approved for treating a specific patient. UpToDate, Inc. and its affiliates disclaim any warranty or liability relating to this information or the use thereof.The use of this information is governed by the Terms of Use, available at https://www.wolterskluwer.com/en/know/clinical-effectiveness-terms. 2024© UpToDate, Inc. and its affiliates and/or licensors. All rights reserved.  Copyright   © 2024 UpToDate, Inc. and/or its affiliates. All rights reserved.  "

## 2025-06-18 NOTE — ASSESSMENT & PLAN NOTE
UA normal. Requesting referral to GI for further eval. She has Zofran to take at home PRN nausea. She is also asking for prescription of Carafate as she has taken some of her 's and it helped pain.

## 2025-06-19 ENCOUNTER — PATIENT OUTREACH (OUTPATIENT)
Facility: HOSPITAL | Age: 30
End: 2025-06-19
Payer: COMMERCIAL

## 2025-06-19 RX ORDER — ERGOCALCIFEROL 1.25 MG/1
50000 CAPSULE ORAL
Qty: 12 CAPSULE | Refills: 1 | Status: SHIPPED | OUTPATIENT
Start: 2025-06-19

## 2025-06-19 NOTE — PROGRESS NOTES
Vitamin B12 and Folate normal. Cholesterol slightly elevated. Follow low fat/low cholesterol diet with increased exercise as tolerated. Fasting glucose and A1C normal. Vitamin D on low end of normal. Continue with weekly supplementation.

## 2025-06-19 NOTE — PROGRESS NOTES
Population Health Chart Review & Patient Outreach Details      Further Action Needed If Patient Returns Outreach:        Health Maintenance Due   Topic Date Due    COVID-19 Vaccine ( season) 2024          Updates Requested / Reviewed:     []  Care Everywhere    []     []  External Sources ([facility], [facility], [facility], etc.)    [] [facility]   [] [facility]   [] Other:    []  Care Team Updated   []  Removed  or Duplicate Orders   []  Immunization Reconciliation Completed / Queried    [] [place]   [] [place]   [] [place]   [] [place]      Health Maintenance Topics Addressed and Outreach Outcomes / Actions Taken:             Breast Cancer Screening []  Mammogram Order Placed    []  Mammogram Screening Scheduled    []  External Records Requested & Care Team Updated if Applicable    []  External Records Uploaded & Care Team Updated if Applicable    []  Pt Declined Scheduling Mammogram    []  Pt Will Schedule with External Provider / Order Routed & Care Team Updated if Applicable              Cervical Cancer Screening []  Pap Smear Scheduled in Primary Care or OBGYN    []  External Records Requested & Care Team Updated if Applicable       []  External Records Uploaded, Care Team Updated, & History Updated if Applicable    []  Patient Declined Scheduling Pap Smear    []  Patient Will Schedule with External Provider & Care Team Updated if Applicable                  Colorectal Cancer Screening []  Colonoscopy Case Request / Referral / Home Test Order Placed    []  External Records Requested & Care Team Updated if Applicable    []  External Records Uploaded, Care Team Updated, & History Updated if Applicable    []  Patient Declined Completing Colon Cancer Screening    []  Patient Will Schedule with External Provider & Care Team Updated if Applicable    []  Fit Kit Mailed (add the SmartPhrase under additional notes)    []  Reminded Patient to Complete Home Test                Diabetic Eye Exam []   Eye Exam Screening Order Placed    []  Eye Camera Scheduled or Optometry/Ophthalmology Referral Placed    []  External Records Requested & Care Team Updated if Applicable    []  External Records Uploaded, Care Team Updated, & History Updated if Applicable    []  Patient Declined Scheduling Eye Exam    []  Patient Will Schedule with External Provider & Care Team Updated if Applicable             Blood Pressure Control []  Primary Care Follow Up Visit Scheduled     []  Remote Blood Pressure Reading Captured    []  Patient Declined Remote Reading or Scheduling Appt - Escalated to PCP    []  Patient Will Call Back or Send Portal Message with Reading                 HbA1c & Other Labs []  Overdue Lab(s) Ordered    []  Overdue Lab(s) Scheduled    []  External Records Uploaded & Care Team Updated if Applicable    []  Primary Care Follow Up Visit Scheduled     []  Reminded Patient to Complete A1c Home Test    []  Patient Declined Scheduling Labs or Will Call Back to Schedule    []  Patient Will Schedule with External Provider / Order Routed, & Care Team Updated if Applicable           Primary Care Appointment []  Primary Care Appt Scheduled    []  Patient Declined Scheduling or Will Call Back to Schedule    []  Pt Established with External Provider, Updated Care Team, & Informed Pt to Notify Payor if Applicable           Medication Adherence /    Statin Use []  Primary Care Appointment Scheduled    []  Patient Reminded to  Prescription    []  Patient Declined, Provider Notified if Needed    []  Sent Provider Message to Review to Evaluate Pt for Statin, Add Exclusion Dx Codes, Document   Exclusion in Problem List, Change Statin Intensity Level to Moderate or High Intensity if Applicable                Osteoporosis Screening []  Dexa Order Placed    []  Dexa Appointment Scheduled    []  External Records Requested & Care Team Updated    []  External Records Uploaded, Care Team Updated, & History Updated if Applicable     []  Patient Declined Scheduling Dexa or Will Call Back to Schedule    []  Patient Will Schedule with External Provider / Order Routed & Care Team Updated if Applicable       Additional Notes:       Post visit Population Health review of encounter with date of service  618/25 with Aneesh.  All required HY components in encounter.    Followup appt for: 6/18/26 HY

## 2025-06-20 ENCOUNTER — OFFICE VISIT (OUTPATIENT)
Dept: FAMILY MEDICINE | Facility: CLINIC | Age: 30
End: 2025-06-20
Payer: COMMERCIAL

## 2025-06-20 VITALS
RESPIRATION RATE: 20 BRPM | TEMPERATURE: 99 F | BODY MASS INDEX: 39.86 KG/M2 | HEIGHT: 68 IN | SYSTOLIC BLOOD PRESSURE: 114 MMHG | DIASTOLIC BLOOD PRESSURE: 77 MMHG | WEIGHT: 263 LBS | HEART RATE: 91 BPM | OXYGEN SATURATION: 97 %

## 2025-06-20 DIAGNOSIS — R51.9 WORSENING HEADACHES: ICD-10-CM

## 2025-06-20 DIAGNOSIS — G43.111 INTRACTABLE MIGRAINE WITH AURA WITH STATUS MIGRAINOSUS: Primary | ICD-10-CM

## 2025-06-20 DIAGNOSIS — M54.2 CERVICALGIA: ICD-10-CM

## 2025-06-20 DIAGNOSIS — G93.5 CHIARI I MALFORMATION: ICD-10-CM

## 2025-06-20 RX ORDER — KETOROLAC TROMETHAMINE 30 MG/ML
30 INJECTION, SOLUTION INTRAMUSCULAR; INTRAVENOUS
Status: COMPLETED | OUTPATIENT
Start: 2025-06-20 | End: 2025-06-20

## 2025-06-20 RX ORDER — ONDANSETRON 2 MG/ML
4 INJECTION INTRAMUSCULAR; INTRAVENOUS
Status: COMPLETED | OUTPATIENT
Start: 2025-06-20 | End: 2025-06-20

## 2025-06-20 RX ORDER — BUTALBITAL, ACETAMINOPHEN AND CAFFEINE 50; 325; 40 MG/1; MG/1; MG/1
1 TABLET ORAL EVERY 4 HOURS PRN
Qty: 20 TABLET | Refills: 0 | Status: SHIPPED | OUTPATIENT
Start: 2025-06-20 | End: 2025-07-20

## 2025-06-20 RX ORDER — PROMETHAZINE HYDROCHLORIDE 12.5 MG/1
12.5 TABLET ORAL 4 TIMES DAILY PRN
Qty: 30 TABLET | Refills: 1 | Status: SHIPPED | OUTPATIENT
Start: 2025-06-20

## 2025-06-20 RX ORDER — BUTALBITAL, ACETAMINOPHEN, CAFFEINE AND CODEINE PHOSPHATE 300; 50; 40; 30 MG/1; MG/1; MG/1; MG/1
1 CAPSULE ORAL EVERY 4 HOURS
Qty: 20 CAPSULE | Refills: 0 | Status: SHIPPED | OUTPATIENT
Start: 2025-06-20 | End: 2025-06-20 | Stop reason: RX

## 2025-06-20 RX ORDER — ONDANSETRON 8 MG/1
8 TABLET, ORALLY DISINTEGRATING ORAL EVERY 6 HOURS PRN
Qty: 30 TABLET | Refills: 1 | Status: SHIPPED | OUTPATIENT
Start: 2025-06-20

## 2025-06-20 RX ADMIN — ONDANSETRON 4 MG: 2 INJECTION INTRAMUSCULAR; INTRAVENOUS at 10:06

## 2025-06-20 RX ADMIN — KETOROLAC TROMETHAMINE 30 MG: 30 INJECTION, SOLUTION INTRAMUSCULAR; INTRAVENOUS at 10:06

## 2025-06-20 NOTE — PROGRESS NOTES
Valeria Melendrez NP   [id number] [facility] [place] [address]  [place]  [address]      PATIENT NAME: Yeny Beaver  : 1995  DATE: 25  MRN: [id number]      Level of Service: [place] OFFICE/OUTPT VISIT, BAL PIRES III, 20-29 MIN  PCP: Valeria Melendrez NP     Reason for Visit / Chief Complaint: Migraine (Patient here for migraines ongoing for about 3 weeks that seems to be getting worse. Pain is to back of head and radiates down neck. Patient has tried OTC pain/headache medication with no relief. Patient reporting headaches have been making her nauseated. )     History of Present Illness / Review of Systems   History of Present Illness    CHIEF COMPLAINT:  Patient presents with a severe headache in the back of the head and neck, accompanied by dizziness, nausea, and blurred vision.    HPI:  Patient reports a headache in the back of her head and neck for approximately 2 weeks, which has progressively worsened. The pain radiates down her spine. This is a new location for her headaches, as she typically has them in her temple area. Associated symptoms include dizziness, blurred vision, feeling hot all over her body, and a sensation of near-syncope. The blurred vision is causing her distress. Patient has nausea severe enough to limit her mobility, but without emesis.    She has attempted to manage symptoms at home with Imitrex injections, nasal sprays (name not recalled), Phenergan, Zofran, Excedrin, and Tylenol, reporting no relief. She previously received Botox treatments for migraines but felt they were ineffective and may have increased her headache frequency. She was previously on preventative treatment with Aimovig but had severe migraines 2 weeks before her scheduled injections, which her provider referred to as rebound headaches.    Patient has a history of Chiari malformation and is concerned that this condition may have worsened. Her last MRI was in .  She denies having had headaches in  this location (back of head and neck) before and this is concerning for her. She is requesting referral to Neuro in [place] as she is unable to travel to [place] to see her previous provider.       ROS:  General: -fever, -chills, -fatigue, -weight gain, -weight loss, +heat intolerance, +feeling of increased warmth  Eyes: -vision changes, -redness, -discharge, +blurry vision  ENT: -ear pain, -nasal congestion, -sore throat  Cardiovascular: -chest pain, -palpitations, -lower extremity edema  Respiratory: -cough, -shortness of breath  Gastrointestinal: -abdominal pain, +nausea, -vomiting, -diarrhea, -constipation, -blood in stool  Genitourinary: -dysuria, -hematuria, -frequency  Musculoskeletal: -joint pain, -muscle pain  Skin: -rash, -lesion  Neurological: +headache, +dizziness, -numbness, -tingling, +neck pain, +near-syncope  Psychiatric: -anxiety, -depression, -sleep difficulty          Medical / Social / Family History     Past Medical History:   Diagnosis Date    Cardiac murmur     Endometriosis     GERD (gastroesophageal reflux disease)     History of bee sting allergy     Migraine headache 11/13/2019    PCOS (polycystic ovarian syndrome)     Periumbilical hernia 08/20/2020    repaired by Dr. Dywer on 10/07/2020    PONV (postoperative nausea and vomiting)     Retroflexion of uterus 07/31/2018    3rd degree       Past Surgical History:   Procedure Laterality Date    CHOLECYSTECTOMY  3-2022    Cyst removed from cervix      Cyst removed from ovary      DILATION AND CURETTAGE OF UTERUS      HERNIA REPAIR      HYSTERECTOMY      LAPAROSCOPIC CHOLECYSTECTOMY N/A 03/09/2022    Procedure: CHOLECYSTECTOMY, LAPAROSCOPIC;  Surgeon: Denny Dwyer MD;  Location: [facility];  Service: General;  Laterality: N/A;    LOOP ELECTROSURGICAL EXCISION PROCEDURE (LEEP)      Operative Laparoscopy with lysis of adhesions      RH/BSO with limited cystoscopy  09/24/2018    TONSILLECTOMY  2018       Medications and Allergies     Outpatient  Medications Marked as Taking for the 6/20/25 encounter (Office Visit) with Valeria Melendrez NP   Medication Sig Dispense Refill    albuterol (PROVENTIL) 2.5 mg /3 mL (0.083 %) nebulizer solution Take 3 mLs (2.5 mg total) by nebulization every 4 to 6 hours as needed for Wheezing. 180 mL 11    albuterol (PROVENTIL/VENTOLIN HFA) 90 mcg/actuation inhaler Inhale 1-2 puffs into the lungs every 6 (six) hours as needed for Wheezing. Rescue 18 g 1    BOTOX 200 unit SolR Inject 155 Units into the skin every 3 (three) months.      cyanocobalamin 1,000 mcg/mL injection Inject 1 mL (1,000 mcg total) into the muscle every 30 days. 1 mL 5    estradioL (ESTRACE) 2 MG tablet Take 1 tablet (2 mg total) by mouth 2 (two) times daily. 60 tablet 2    fluticasone-salmeterol diskus inhaler 250-50 mcg Inhale 1 puff into the lungs daily as needed (wheezing). Controller 1 each 3    gabapentin (NEURONTIN) 300 MG capsule Take 1 capsule (300 mg total) by mouth 3 (three) times daily. 90 capsule 3    hyoscyamine 0.125 mg Subl Place 1 tablet (0.125 mg total) under the tongue every 4 (four) hours as needed (stomach cramp). 30 tablet 0    omeprazole (PRILOSEC) 40 MG capsule Take 1 capsule (40 mg total) by mouth every morning. 90 capsule 1    scopolamine (TRANSDERM-SCOP) 1.3-1.5 mg (1 mg over 3 days) Place 1 patch onto the skin Every 3 (three) days.      sucralfate (CARAFATE) 1 gram tablet Take 1 tablet (1 g total) by mouth 4 (four) times daily before meals and nightly. 120 tablet 0    triamcinolone acetonide 0.1% (KENALOG) 0.1 % ointment Apply to affected area 2 TIMES A DAY, NO LONGER THEN 1 WEEK 454 g 0    ZEMBRACE SYMTOUCH 3 mg/0.5 mL PnIj INJECT 3MG UNDER THE SKIN AT ONSET OF MIGRAINE;MAY REPEAT DOSE IF NEEDED AFTER 1 HOUR. MAX OF 4 INJECTIONS IN 24 HOURS      [DISCONTINUED] ondansetron (ZOFRAN-ODT) 8 MG TbDL Take 1 tablet (8 mg total) by mouth every 6 (six) hours as needed (nausea). 30 tablet 1    [DISCONTINUED] promethazine (PHENERGAN) 12.5 MG  Tab Take 12.5 mg by mouth 4 (four) times daily as needed.       Current Facility-Administered Medications for the 6/20/25 encounter (Office Visit) with Valeria Melendrez NP   Medication Dose Route Frequency Provider Last Rate Last Admin    [COMPLETED] ketorolac injection 30 mg  30 mg Intramuscular 1 time in Clinic/HOD Valeria Melendrez NP   30 mg at 06/20/25 1034    [COMPLETED] ondansetron injection 4 mg  4 mg Intramuscular 1 time in Clinic/HOD Valeria Melendrez NP   4 mg at 06/20/25 1034       Review of patient's allergies indicates:   Allergen Reactions    Adhesive      Adhesive on hormone patch    Allergen ext-venom-honey bee Other (See Comments)    Insect venom      Bee sting       Physical Examination     Vitals:    06/20/25 0927   BP: 114/77   Pulse: 91   Resp: 20   Temp: 98.7 °F (37.1 °C)     Physical Exam  Vitals and nursing note reviewed.   HENT:      Head: Normocephalic.      Nose: Nose normal.      Mouth/Throat:      Mouth: Mucous membranes are moist.      Pharynx: Oropharynx is clear. No posterior oropharyngeal erythema.   Eyes:      Conjunctiva/sclera: Conjunctivae normal.   Cardiovascular:      Rate and Rhythm: Normal rate and regular rhythm.      Pulses: Normal pulses.      Heart sounds: Normal heart sounds.   Pulmonary:      Effort: Pulmonary effort is normal.      Breath sounds: Normal breath sounds.   Abdominal:      General: Abdomen is flat. Bowel sounds are normal. There is no distension.      Palpations: Abdomen is soft.   Musculoskeletal:         General: No swelling or tenderness. Normal range of motion.      Cervical back: Normal range of motion. Muscular tenderness present.      Right lower leg: No edema.      Left lower leg: No edema.   Skin:     General: Skin is warm and dry.      Capillary Refill: Capillary refill takes less than 2 seconds.   Neurological:      Mental Status: She is alert. Mental status is at baseline.   Psychiatric:         Mood and Affect: Mood normal.          Behavior: Behavior normal.                 Assessment and Plan      Problem List Items Addressed This Visit       Migraine headache - Primary    Relevant Medications    ondansetron (ZOFRAN-ODT) 8 MG TbDL    promethazine (PHENERGAN) 12.5 MG Tab    ketorolac injection 30 mg (Completed)    ondansetron injection 4 mg (Completed)    butalbital-acetaminophen-caffeine -40 mg (FIORICET, ESGIC) -40 mg per tablet    Other Relevant Orders    Ambulatory referral/consult to Neurology     Other Visit Diagnoses         Worsening headaches        Relevant Orders    MRI Brain Without Contrast      Chiari I malformation          Cervicalgia                Assessment & Plan    IMPRESSION:  Assessed worsening headache symptoms, noting new location in back of head/neck with associated dizziness, blurred vision, and feeling of passing out.  Considered potential progression of known Chiari malformation as cause for new symptoms.  Determined MRI brain warranted to evaluate for any changes or new pathology given worsening symptoms in new location.  Opted for Toradol and Zofran injection combination for acute headache relief.    CHIARI MALFORMATION:  - Patient reports worsening headaches and dizziness, located in a different spot than usual, possibly indicating progression of Chiari malformation.  - Ordered MRI brain to evaluate the condition and assess these worsening symptoms.    MIGRAINE:  - Patient experiences worsening migraines over the past 2 weeks, with blurred vision and dizziness.  - Current treatment (Imitrex injections, nasal sprays, Phenergan, and Zofran) is ineffective, as were previous preventative treatments including Botox and Aimovig injections.  - Administered Toradol injection and Zofran in office today.  - Provided refills for Zofran and Phenergan.  - Referred to neurology for evaluation and management of chronic migraines.    CERVICALGIA:  - Patient reports pain in the back of the head and neck, worsening  over 2 weeks.  - This new and worsening symptom will be investigated with the ordered MRI.    FOLLOW UP  Will call with MRI results and appts.  Follow up as needed.      Health Maintenance     Health Maintenance Topics with due status: Not Due       Topic Last Completion Date    TETANUS VACCINE 08/31/2019    Pneumococcal Vaccines (Age 0-49) 10/13/2022    RSV Vaccine (Age 60+ and Pregnant patients) Not Due       Health Maintenance Due   Topic Date Due    COVID-19 Vaccine (2 - 2024-25 season) 09/01/2024          Signature:  Valeria Melendrez NP  [address] [place]  [address]    Date of encounter: 6/20/25    This note was generated with the assistance of ambient listening technology. Verbal consent was obtained by the patient and accompanying visitor(s) for the recording of patient appointment to facilitate this note. I attest to having reviewed and edited the generated note for accuracy, though some syntax or spelling errors may persist. Please contact the author of this note for any clarification.

## 2025-06-23 ENCOUNTER — OFFICE VISIT (OUTPATIENT)
Dept: ORTHOPEDICS | Facility: CLINIC | Age: 30
End: 2025-06-23
Payer: COMMERCIAL

## 2025-06-23 VITALS
BODY MASS INDEX: 39.84 KG/M2 | WEIGHT: 262.88 LBS | OXYGEN SATURATION: 99 % | HEIGHT: 68 IN | HEART RATE: 100 BPM | DIASTOLIC BLOOD PRESSURE: 84 MMHG | SYSTOLIC BLOOD PRESSURE: 134 MMHG

## 2025-06-23 DIAGNOSIS — M70.62 GREATER TROCHANTERIC BURSITIS OF BOTH HIPS: Primary | ICD-10-CM

## 2025-06-23 DIAGNOSIS — M70.61 GREATER TROCHANTERIC BURSITIS OF BOTH HIPS: Primary | ICD-10-CM

## 2025-06-23 PROCEDURE — 99215 OFFICE O/P EST HI 40 MIN: CPT | Mod: PBBFAC,25 | Performed by: NURSE PRACTITIONER

## 2025-06-23 PROCEDURE — 3008F BODY MASS INDEX DOCD: CPT | Mod: ,,, | Performed by: NURSE PRACTITIONER

## 2025-06-23 PROCEDURE — 99999PBSHW PR PBB SHADOW TECHNICAL ONLY FILED TO HB: Mod: PBBFAC,,,

## 2025-06-23 PROCEDURE — 3075F SYST BP GE 130 - 139MM HG: CPT | Mod: ,,, | Performed by: NURSE PRACTITIONER

## 2025-06-23 PROCEDURE — 99213 OFFICE O/P EST LOW 20 MIN: CPT | Mod: S$PBB,25,, | Performed by: NURSE PRACTITIONER

## 2025-06-23 PROCEDURE — 1159F MED LIST DOCD IN RCRD: CPT | Mod: ,,, | Performed by: NURSE PRACTITIONER

## 2025-06-23 PROCEDURE — 20610 DRAIN/INJ JOINT/BURSA W/O US: CPT | Mod: PBBFAC,LT | Performed by: NURSE PRACTITIONER

## 2025-06-23 PROCEDURE — 3079F DIAST BP 80-89 MM HG: CPT | Mod: ,,, | Performed by: NURSE PRACTITIONER

## 2025-06-23 PROCEDURE — 99999 PR PBB SHADOW E&M-EST. PATIENT-LVL V: CPT | Mod: PBBFAC,,, | Performed by: NURSE PRACTITIONER

## 2025-06-23 PROCEDURE — 3044F HG A1C LEVEL LT 7.0%: CPT | Mod: ,,, | Performed by: NURSE PRACTITIONER

## 2025-06-23 PROCEDURE — 20610 DRAIN/INJ JOINT/BURSA W/O US: CPT | Mod: PBBFAC,RT | Performed by: NURSE PRACTITIONER

## 2025-06-23 RX ORDER — TRIAMCINOLONE ACETONIDE 40 MG/ML
40 INJECTION, SUSPENSION INTRA-ARTICULAR; INTRAMUSCULAR
Status: DISCONTINUED | OUTPATIENT
Start: 2025-06-23 | End: 2025-06-23 | Stop reason: HOSPADM

## 2025-06-23 RX ORDER — DICLOFENAC SODIUM 75 MG/1
75 TABLET, DELAYED RELEASE ORAL 2 TIMES DAILY
Qty: 30 TABLET | Refills: 0 | Status: SHIPPED | OUTPATIENT
Start: 2025-06-23

## 2025-06-23 RX ADMIN — TRIAMCINOLONE ACETONIDE 40 MG: 40 INJECTION, SUSPENSION INTRA-ARTICULAR; INTRAMUSCULAR at 10:06

## 2025-06-23 NOTE — PROGRESS NOTES
CC:  Hip pain  29 y.o. Female returns to clinic for a follow up visit regarding     ICD-10-CM ICD-9-CM   1. Greater trochanteric bursitis of both hips  M70.61 726.5    M70.62        Patient received bilateral GT injections 4/28/2025.   She reports the pain returned about 3-4 weeks ago.   She states that the right is worse than the left, but feels that pain is worse now than at first.  He is very tender on the lateral side of both hips.  No pain in the groin.      PAST MEDICAL HISTORY:   Past Medical History:   Diagnosis Date    Cardiac murmur     Endometriosis     GERD (gastroesophageal reflux disease)     History of bee sting allergy     Migraine headache 11/13/2019    PCOS (polycystic ovarian syndrome)     Periumbilical hernia 08/20/2020    repaired by Dr. Dwyer on 10/07/2020    PONV (postoperative nausea and vomiting)     Retroflexion of uterus 07/31/2018    3rd degree       PAST SURGICAL HISTORY:   Past Surgical History:   Procedure Laterality Date    CHOLECYSTECTOMY  3-2022    Cyst removed from cervix      Cyst removed from ovary      DILATION AND CURETTAGE OF UTERUS      HERNIA REPAIR      HYSTERECTOMY      LAPAROSCOPIC CHOLECYSTECTOMY N/A 03/09/2022    Procedure: CHOLECYSTECTOMY, LAPAROSCOPIC;  Surgeon: Denny Dwyer MD;  Location: [facility];  Service: General;  Laterality: N/A;    LOOP ELECTROSURGICAL EXCISION PROCEDURE (LEEP)      Operative Laparoscopy with lysis of adhesions      RH/BSO with limited cystoscopy  09/24/2018    TONSILLECTOMY  2018         PHYSICAL EXAMINATION:              Right Hip Exam     Inspection   Swelling: absent  Bruising: absent    Range of Motion   Extension:  normal   Flexion:  normal   External rotation:  normal   Internal rotation:  normal     Tests   Pain w/ forced internal rotation (JANET): absent  Pain w/ forced external rotation (FADIR): absent    Other   Sensation: normal  Left Hip Exam     Inspection   Swelling: absent  Bruising: absent    Tenderness   The patient  tender to palpation of the trochanteric bursa.    Range of Motion   Extension:  normal   Flexion:  normal   External rotation:  normal   Internal rotation: normal     Tests   Pain w/ forced internal rotation (JANET): absent  Pain w/ forced external rotation (FADIR): absent    Other   Sensation: normal          IMAGING:  No results found.       ASSESSMENT:      ICD-10-CM ICD-9-CM   1. Greater trochanteric bursitis of both hips  M70.61 726.5    M70.62        PLAN:     -Findings and treatment options were discussed with the patient  -All questions answered  Natural history and expected course discussed. Questions answered.  Educational materials distributed.  NSAIDs per medication orders.  PT referral.  Steroid injection for trochanteric bursitis. See procedure note.  Bilateral GT bursa injections today.  We will set up formal therapy.  Diclofenac p.o. b.i.d..  If no better we will consider MRIs in 3 months    There are no Patient Instructions on file for this visit.    Orders Placed This Encounter   Procedures    Ambulatory Referral/Consult to Physical Therapy       Large Joint Aspiration/Injection: R greater trochanteric bursa    Date/Time: 6/23/2025 10:20 AM    Performed by: Mikaela Odell FNP  Authorized by: Mikaela Odell FNP    Consent Done?:  Yes (Verbal)  Indications:  Pain  Local anesthetic:  Bupivacaine 0.25% without epinephrine  Anesthetic total (ml):  4      Details:  Needle Size:  22 G  Approach:  Lateral  Location:  Hip  Site:  R greater trochanteric bursa  Medications:  40 mg triamcinolone acetonide 40 mg/mL  Patient tolerance:  Patient tolerated the procedure well with no immediate complications  Large Joint Aspiration/Injection: L greater trochanteric bursa    Date/Time: 6/23/2025 10:20 AM    Performed by: Mikaela Odell FNP  Authorized by: Mikaela Odell FNP    Consent Done?:  Yes (Verbal)  Indications:  Pain  Local anesthetic:  Bupivacaine 0.25% without epinephrine    Details:  Needle  Size:  22 G  Approach:  Lateral  Location:  Hip  Site:  L greater trochanteric bursa  Medications:  40 mg triamcinolone acetonide 40 mg/mL  Patient tolerance:  Patient tolerated the procedure well with no immediate complications

## 2025-06-26 ENCOUNTER — CLINICAL SUPPORT (OUTPATIENT)
Dept: REHABILITATION | Facility: HOSPITAL | Age: 30
End: 2025-06-26
Payer: COMMERCIAL

## 2025-06-26 DIAGNOSIS — M70.61 GREATER TROCHANTERIC BURSITIS OF BOTH HIPS: ICD-10-CM

## 2025-06-26 DIAGNOSIS — M70.62 GREATER TROCHANTERIC BURSITIS OF BOTH HIPS: ICD-10-CM

## 2025-06-26 PROCEDURE — 97161 PT EVAL LOW COMPLEX 20 MIN: CPT | Mod: PN

## 2025-06-26 PROCEDURE — 97110 THERAPEUTIC EXERCISES: CPT | Mod: PN

## 2025-06-26 NOTE — PROGRESS NOTES
Outpatient Rehab    Physical Therapy Evaluation    Patient Name: Yeny Beaver  MRN: [id number]  YOB: 1995  Encounter Date: 6/26/2025    Therapy Diagnosis:   Encounter Diagnosis   Name Primary?    Greater trochanteric bursitis of both hips      Physician: Mikaela Odell FNP    Physician Orders: Eval and Treat  Medical Diagnosis: Greater trochanteric bursitis of both hips  Surgical Diagnosis: Not applicable for this Episode   Surgical Date: Not applicable for this Episode  Days Since Last Surgery: Not applicable for this Episode    Visit # / Visits Authorized:  1 / 1  Insurance Authorization Period: 6/23/2025 to 6/23/2026  Date of Evaluation: 6/26/2025  Plan of Care Certification: 6/26/2025 to 7/26/2025      Time In: 1530   Time Out: 1615  Total Time (in minutes): 45   Total Billable Time (in minutes): 45    Intake Outcome Measure for FOTO Survey    Therapist reviewed FOTO scores for Yeny Beaver on 6/26/2025.   FOTO report - see Media section or FOTO account episode details.     Intake Score:  %    Precautions:       Subjective   History of Present Illness  Yeny is a 29 y.o. female who reports to physical therapy with a chief concern of bilateral hip pain.     The patient reports a medical diagnosis of bilateral greater trochanteric bursitis. The patient has experienced this issue since 06/26/25.           Dominant Hand: Right  History of Present Condition/Illness: Pt voices she has been having bilateral greater trochanteric bursitis for over a year . Pt voices she has pain on both hips and can't sleep on either side long at night. Pt voices she has increased pain with walking or standing long at times. Pt voices it band tightness and gerdy tubercle tightness with movie goers sign with feet going to sleep     Pain     Patient reports a current pain level of 8/10. Pain at best is reported as 3/10. Pain at worst is reported as 10/10.   Location: bilateral hips with right worse than  the left  Clinical Progression (since onset): Worsening  Pain Qualities: Aching, Discomfort, Knife-like, Pulling, Sharp, Tenderness, Tightness, Dull  Pain-Relieving Factors: Rest  Pain-Aggravating Factors: Exercise, Sleeping, Walking, Stretching, Standing         Treatment History  Treatments  Previously Received Treatments: No  Currently Receiving Treatments: No    Living Arrangements  Living Situation  Living Arrangements: Spouse/significant other        Employment  Employment Status: Not employed          Past Medical History/Physical Systems Review:   Yeny Beaver  has a past medical history of Cardiac murmur, Endometriosis, GERD (gastroesophageal reflux disease), History of bee sting allergy, Migraine headache, PCOS (polycystic ovarian syndrome), Periumbilical hernia, PONV (postoperative nausea and vomiting), and Retroflexion of uterus.    Yeny Beaver  has a past surgical history that includes Cyst removed from ovary; Cyst removed from cervix; Dilation and curettage of uterus; Operative Laparoscopy with lysis of adhesions; Hernia repair; Loop electrosurgical excision procedure (LEEP); RH/BSO with limited cystoscopy (09/24/2018); Hysterectomy; Laparoscopic cholecystectomy (N/A, 03/09/2022); Tonsillectomy (2018); and Cholecystectomy (3-2022).    Yeny has a current medication list which includes the following prescription(s): albuterol, albuterol, botox, butalbital-acetaminophen-caffeine -40 mg, cyanocobalamin, diclofenac, epinephrine, ergocalciferol, estradiol, fluticasone-salmeterol 250-50 mcg/dose, gabapentin, hyoscyamine, omeprazole, ondansetron, promethazine, scopolamine, sucralfate, triamcinolone acetonide 0.1%, and zembrace symtouch.    Review of patient's allergies indicates:   Allergen Reactions    Adhesive      Adhesive on hormone patch    Allergen ext-venom-honey bee Other (See Comments)    Insect venom      Bee sting        Objective      Hip Palpation     Bilateral greater  trochanteric bursitis pain with palpation as well as it band pain with palpation                   Hip Range of Motion   Right Hip   Active (deg) Passive (deg) Pain   Flexion 125       Extension         ABduction         ADduction         External Rotation 90/90         External Rotation Prone         Internal Rotation 90/90         Internal Rotation Prone             Left Hip   Active (deg) Passive (deg) Pain   Flexion 130       Extension         ABduction         ADduction         External Rotation 90/90         External Rotation Prone         Internal Rotation 90/90         Internal Rotation Prone                  Knee Range of Motion   Right Knee   Active (deg) Passive (deg) Pain   Flexion 125       Extension 0           Left Knee   Active (deg) Passive (deg) Pain   Flexion 125       Extension 0                          Hip Strength - Planes of Motion   Right Strength Right Pain Left Strength Left  Pain   Flexion (L2) 4   4     Extension 4   4     ABduction 4   4     ADduction 4   4     Internal Rotation 4   4     External Rotation 4   4         Knee Strength   Right Strength Right Pain Left Strength Left  Pain   Flexion (S2) 4   4     Prone Flexion 4   4     Extension (L3) 4   4            Ankle/Foot Strength - Planes of Motion   Right Strength Right Pain Left Strength Left  Pain   Dorsiflexion (L4) 5   5     Plantar Flexion (S1) 5   5     Inversion 5   5     Eversion 5   5     Great Toe Flexion 5   5     Great Toe Extension (L5) 5   5     Lesser Toes Flexion 5   5     Lesser Toes Extension 5   5               Treatment:       Time Entry(in minutes):       Assessment & Plan   Assessment  Raygene            Functional Limitations: Activity tolerance, Ambulating on uneven surfaces, Completing work/school activities, Functional mobility, Disrupted sleep pattern, Performing household chores, Participating in leisure activities, Painful locomotion/ambulation, Standing tolerance, Participating in sports, Range of  motion, Getting off the floor  Impairments: Abnormal or restricted range of motion, Pain with functional activity, Impaired physical strength, Activity intolerance, Abnormal muscle firing, Abnormal muscle tone  Personal Factors Affecting Prognosis: Physical limitations, Pain    Prognosis: Excellent  Assessment Details: Pt has bilateral it band tendonitis and bilateral greater trochanteric bursitis . Pt will benefit from hip/back , it band stretching, modalities to decrease pain and strengthening to progress to pain free gait     Plan  From a physical therapy perspective, the patient would benefit from: Skilled Rehab Services    Planned therapy interventions include: Therapeutic exercise, Therapeutic activities, Neuromuscular re-education, and Manual therapy.    Planned modalities to include: Electrical stimulation - passive/unattended and Ultrasound.        Visit Frequency: 2 times Per Week for 4 Weeks.       This plan was discussed with Patient, Therapy assistant, and Family.   Discussion participants: Agreed Upon Plan of Care  Plan details: Plan Plan of care Certification:  6/26/2025  to 7/26/2025 . Outpatient Physical Therapy 2 times weekly for 4 weeks to include the following interventions: Manual Therapy, Neuromuscular Re-ed, Patient Education, Therapeutic Activities, Therapeutic Exercise, and Ultrasound and dry needling .  Plan of care has been reestablished with Angella CALLES and Mariza Hung PT           The patient's spiritual, cultural, and educational needs were considered, and the patient is agreeable to the plan of care and goals.     Education  Education was done with Patient. The patient's learning style includes Demonstration, Listening, and Pictures/video. The patient Verbalizes understanding and Demonstrates understanding.                 Goals:   Active       long term goals        increase all hip strength to 5/5        Start:  06/26/25    Expected End:  08/26/25             be able to return to walking for health pain free , greater than a mile        Start:  06/26/25    Expected End:  08/26/25            be pain free after sitting prolonged        Start:  06/26/25    Expected End:  08/26/25               short term goals        pt will be able to increase hip flexion to 130 degrees on right side        Start:  06/26/25    Expected End:  07/26/25            be independent with home ex program        Start:  06/26/25    Expected End:  07/26/25            be able to decrease pain down from 10/10 to 7/10 at worse        Start:  06/26/25    Expected End:  07/26/25            be able to sleep on either side without awaking with pain        Start:  06/26/25    Expected End:  07/26/25                Carmine Hung, PT

## 2025-06-30 ENCOUNTER — PATIENT MESSAGE (OUTPATIENT)
Dept: ORTHOPEDICS | Facility: CLINIC | Age: 30
End: 2025-06-30
Payer: COMMERCIAL

## 2025-06-30 RX ORDER — MELOXICAM 7.5 MG/1
7.5 TABLET ORAL DAILY
Qty: 30 TABLET | Refills: 0 | Status: SHIPPED | OUTPATIENT
Start: 2025-06-30

## 2025-07-08 ENCOUNTER — CLINICAL SUPPORT (OUTPATIENT)
Dept: REHABILITATION | Facility: HOSPITAL | Age: 30
End: 2025-07-08
Payer: COMMERCIAL

## 2025-07-08 DIAGNOSIS — M70.62 GREATER TROCHANTERIC BURSITIS OF BOTH HIPS: Primary | ICD-10-CM

## 2025-07-08 DIAGNOSIS — M25.651 DECREASED RANGE OF MOTION OF BOTH HIPS: ICD-10-CM

## 2025-07-08 DIAGNOSIS — M70.61 GREATER TROCHANTERIC BURSITIS OF BOTH HIPS: Primary | ICD-10-CM

## 2025-07-08 DIAGNOSIS — M25.652 DECREASED RANGE OF MOTION OF BOTH HIPS: ICD-10-CM

## 2025-07-08 PROCEDURE — 97112 NEUROMUSCULAR REEDUCATION: CPT | Mod: PN,CQ

## 2025-07-08 PROCEDURE — 97530 THERAPEUTIC ACTIVITIES: CPT | Mod: PN,CQ

## 2025-07-08 PROCEDURE — 97035 APP MDLTY 1+ULTRASOUND EA 15: CPT | Mod: PN,CQ

## 2025-07-08 NOTE — PROGRESS NOTES
Outpatient Rehab    Physical Therapy Visit    Patient Name: Yeny Beaver  MRN: [id number]  YOB: 1995  Encounter Date: 7/8/2025    Therapy Diagnosis:   Encounter Diagnoses   Name Primary?    Greater trochanteric bursitis of both hips Yes    Decreased range of motion of both hips      Physician: Mikaela Odell FNP    Physician Orders: Eval and Treat  Medical Diagnosis: Greater trochanteric bursitis of both hips  Surgical Diagnosis: Not applicable for this Episode   Surgical Date: Not applicable for this Episode  Days Since Last Surgery: Not applicable for this Episode    Visit # / Visits Authorized:  1 / 7  Pta visit# 1  Insurance Authorization Period: 6/26/2025 to 7/26/2025  Date of Evaluation: 6/26/2025  Plan of Care Certification: 6/26/2025 to 7/26/2025        Time In:   330  Time Out:  415  Total Time (in minutes): 45    Total Billable Time (in minutes):  38    FOTO:  Intake Score:  %  Survey Score 2:  %  Survey Score 3:  %    Precautions:       Subjective   I started on meloxiam about a week ago, but it hasn't helped much..  Pain reported as 8/10. 8/10 right, left 6/10    Objective            Treatment:  Manual Therapy  MT 1: foam roller myofacial release to litzy hips  Balance/Neuromuscular Re-Education  NMR 1: slant board 4 x 15 s/h  NMR 2: litzy hs stretch off step x 10  NMR 3: litzy ITB  Therapeutic Activity  TA 1: biking x 5' to promote endurance with walking  TA 2: ds squats tg x 20 to simulate bending and squatting  TA 3: ds calf raises tg x 20 to simulate reaching on tip toes  TA 4: ds leg presses 20 x 65# to simulate bending and squatting  Modalities  Ultrasound: us to litzy GTB/ ITB 2.2 w/cm2 1 mhz x 10' after being cleared of contraindications    Time Entry(in minutes):       Assessment & Plan   Assessment:         The patient will continue to benefit from skilled outpatient physical therapy in order to address the deficits listed in the problem list on the initial evaluation, provide  patient and family education, and maximize the patients level of independence in the home and community environments.     The patient's spiritual, cultural, and educational needs were considered, and the patient is agreeable to the plan of care and goals.           Plan:      Goals:   Active       long term goals        increase all hip strength to 5/5        Start:  06/26/25    Expected End:  08/26/25            be able to return to walking for health pain free , greater than a mile        Start:  06/26/25    Expected End:  08/26/25            be pain free after sitting prolonged        Start:  06/26/25    Expected End:  08/26/25               short term goals        pt will be able to increase hip flexion to 130 degrees on right side        Start:  06/26/25    Expected End:  07/26/25            be independent with home ex program        Start:  06/26/25    Expected End:  07/26/25            be able to decrease pain down from 10/10 to 7/10 at worse        Start:  06/26/25    Expected End:  07/26/25            be able to sleep on either side without awaking with pain        Start:  06/26/25    Expected End:  07/26/25            Plan of care Certification: 6/26/2025 to 7/26/2025 . Outpatient Physical Therapy 2 times weekly for 4 weeks to include the following interventions: Manual Therapy, Neuromuscular Re-ed, Patient Education, Therapeutic Activities, Therapeutic Exercise, and Ultrasound and dry needling .     Tess Marquez, PTA

## 2025-07-10 ENCOUNTER — CLINICAL SUPPORT (OUTPATIENT)
Dept: REHABILITATION | Facility: HOSPITAL | Age: 30
End: 2025-07-10
Payer: COMMERCIAL

## 2025-07-10 DIAGNOSIS — M70.61 GREATER TROCHANTERIC BURSITIS OF BOTH HIPS: ICD-10-CM

## 2025-07-10 DIAGNOSIS — M25.652 DECREASED RANGE OF MOTION OF BOTH HIPS: Primary | ICD-10-CM

## 2025-07-10 DIAGNOSIS — M25.651 DECREASED RANGE OF MOTION OF BOTH HIPS: Primary | ICD-10-CM

## 2025-07-10 DIAGNOSIS — M70.62 GREATER TROCHANTERIC BURSITIS OF BOTH HIPS: ICD-10-CM

## 2025-07-10 PROCEDURE — 97112 NEUROMUSCULAR REEDUCATION: CPT | Mod: PN

## 2025-07-10 PROCEDURE — 97530 THERAPEUTIC ACTIVITIES: CPT | Mod: PN

## 2025-07-10 NOTE — PROGRESS NOTES
Outpatient Rehab    Physical Therapy Visit    Patient Name: Yeny Beaver  MRN: [id number]  YOB: 1995  Encounter Date: 7/10/2025    Therapy Diagnosis:   Encounter Diagnoses   Name Primary?    Decreased range of motion of both hips Yes    Greater trochanteric bursitis of both hips      Physician: Mikaela Odell FNP    Physician Orders: Eval and Treat  Medical Diagnosis: Greater trochanteric bursitis of both hips  Surgical Diagnosis: Not applicable for this Episode   Surgical Date: Not applicable for this Episode  Days Since Last Surgery: Not applicable for this Episode    Visit # / Visits Authorized:  2 / 7  Insurance Authorization Period: 6/26/2025 to 7/26/2025  Date of Evaluation: 6/26/2025  Plan of Care Certification: 6/26/2025 to 7/26/2025      PT/PTA:     Number of PTA visits since last PT visit:   Time In: 1315   Time Out: 1415  Total Time (in minutes): 60   Total Billable Time (in minutes):      FOTO:  Intake Score:  %  Survey Score 2:  %  Survey Score 3:  %    Precautions:       Subjective   Pt states she felt better after tx tuesday, but is hurting again..  Pain reported as 8/10. 7/10 right, left 6/10    Objective            Treatment:  Balance/Neuromuscular Re-Education  NMR 1: slant board 4 x 15 s/h  NMR 2: litzy hs stretch off step x 10  NMR 3: litzy ITB  Therapeutic Activity  TA 1: biking x 5' to promote endurance with walking  TA 2: ds squats tg x 20 to simulate bending and squatting  TA 3: ds calf raises tg x 20 to simulate reaching on tip toes  TA 4: ds leg presses 30 x 65# to simulate bending and squatting  Modalities  Ultrasound: us to litzy GTB/ ITB 2.2 w/cm2 1 mhz x 5' ea side after being cleared of contraindications    Time Entry(in minutes):       Assessment & Plan   Assessment: Observed pt to have knee medial/lateral knee instability during standing quad activities. Pt states ultrasound has improved her pain. Pt able to perform exercises correctly with verbal and tactile cues.         The patient will continue to benefit from skilled outpatient physical therapy in order to address the deficits listed in the problem list on the initial evaluation, provide patient and family education, and maximize the patients level of independence in the home and community environments.     The patient's spiritual, cultural, and educational needs were considered, and the patient is agreeable to the plan of care and goals.           Plan:      Goals:   Active       long term goals        increase all hip strength to 5/5        Start:  06/26/25    Expected End:  08/26/25            be able to return to walking for health pain free , greater than a mile        Start:  06/26/25    Expected End:  08/26/25            be pain free after sitting prolonged        Start:  06/26/25    Expected End:  08/26/25               short term goals        pt will be able to increase hip flexion to 130 degrees on right side        Start:  06/26/25    Expected End:  07/26/25            be independent with home ex program        Start:  06/26/25    Expected End:  07/26/25            be able to decrease pain down from 10/10 to 7/10 at worse        Start:  06/26/25    Expected End:  07/26/25            be able to sleep on either side without awaking with pain        Start:  06/26/25    Expected End:  07/26/25                Carmine Hung PT

## 2025-07-11 ENCOUNTER — HOSPITAL ENCOUNTER (OUTPATIENT)
Dept: RADIOLOGY | Facility: HOSPITAL | Age: 30
Discharge: HOME | End: 2025-07-11
Attending: NURSE PRACTITIONER
Payer: COMMERCIAL

## 2025-07-11 DIAGNOSIS — R51.9 WORSENING HEADACHES: ICD-10-CM

## 2025-07-11 PROCEDURE — 70551 MRI BRAIN STEM W/O DYE: CPT | Mod: TC

## 2025-07-11 PROCEDURE — 70551 MRI BRAIN STEM W/O DYE: CPT | Mod: 26,,, | Performed by: RADIOLOGY

## 2025-07-14 ENCOUNTER — CLINICAL SUPPORT (OUTPATIENT)
Dept: REHABILITATION | Facility: HOSPITAL | Age: 30
End: 2025-07-14
Payer: COMMERCIAL

## 2025-07-14 DIAGNOSIS — M25.651 DECREASED RANGE OF MOTION OF BOTH HIPS: Primary | ICD-10-CM

## 2025-07-14 DIAGNOSIS — M70.62 GREATER TROCHANTERIC BURSITIS OF BOTH HIPS: ICD-10-CM

## 2025-07-14 DIAGNOSIS — M25.652 DECREASED RANGE OF MOTION OF BOTH HIPS: Primary | ICD-10-CM

## 2025-07-14 DIAGNOSIS — M70.61 GREATER TROCHANTERIC BURSITIS OF BOTH HIPS: ICD-10-CM

## 2025-07-14 PROCEDURE — 97035 APP MDLTY 1+ULTRASOUND EA 15: CPT | Mod: PN,CQ

## 2025-07-14 PROCEDURE — 97530 THERAPEUTIC ACTIVITIES: CPT | Mod: PN,CQ

## 2025-07-14 PROCEDURE — 97112 NEUROMUSCULAR REEDUCATION: CPT | Mod: PN,CQ

## 2025-07-14 NOTE — PROGRESS NOTES
Outpatient Rehab    Physical Therapy Visit    Patient Name: Yeny Beaver  MRN: [id number]  YOB: 1995  Encounter Date: 7/14/2025    Therapy Diagnosis:   Encounter Diagnoses   Name Primary?    Decreased range of motion of both hips Yes    Greater trochanteric bursitis of both hips      Physician: Mikaela Odell FNP    Physician Orders: Eval and Treat  Medical Diagnosis: Greater trochanteric bursitis of both hips  Surgical Diagnosis: Not applicable for this Episode   Surgical Date: Not applicable for this Episode  Days Since Last Surgery: Not applicable for this Episode    Visit # / Visits Authorized:  3 / 7  Pta visit# 1  Insurance Authorization Period: 6/26/2025 to 7/26/2025  Date of Evaluation: 6/26/2025  Plan of Care Certification: 6/26/2025 to 7/26/2025        Time In:  800   Time Out:  845  Total Time (in minutes):     Total Billable Time (in minutes):  45    FOTO:  Intake Score: Not applicable for this Episode%  Survey Score 2: Not applicable for this Episode%  Survey Score 3: Not applicable for this Episode%    Precautions:         Subjective   I'm about the same.    7/10 righr 6/10 left    Objective            Treatment:  Balance/Neuromuscular Re-Education  NMR 1: slant board 4 x 15 s/h  NMR 2: litzy hs stretch off step x 10  NMR 3: litzy ITB  NMR 4: litzy sidelying hip ir blue tb x 10  Therapeutic Activity  TA 1: biking x 5' to promote endurance with walking  TA 2: ds squats tg x 20 to simulate bending and squatting  TA 3: ds calf raises tg x 20 to simulate reaching on tip toes  TA 4: ds leg presses 30 x 65# to simulate bending and squatting  Modalities  Ultrasound: us to litzy GTB/ ITB 2.2 w/cm2 1 mhz x 5' ea side after being cleared of contraindications    Time Entry(in minutes):       Assessment & Plan   Assessment:         The patient will continue to benefit from skilled outpatient physical therapy in order to address the deficits listed in the problem list on the initial evaluation,  provide patient and family education, and maximize the patients level of independence in the home and community environments.     The patient's spiritual, cultural, and educational needs were considered, and the patient is agreeable to the plan of care and goals.           Plan:      Goals:   Active       long term goals        increase all hip strength to 5/5        Start:  06/26/25    Expected End:  08/26/25            be able to return to walking for health pain free , greater than a mile        Start:  06/26/25    Expected End:  08/26/25            be pain free after sitting prolonged        Start:  06/26/25    Expected End:  08/26/25               short term goals        pt will be able to increase hip flexion to 130 degrees on right side        Start:  06/26/25    Expected End:  07/26/25            be independent with home ex program        Start:  06/26/25    Expected End:  07/26/25            be able to decrease pain down from 10/10 to 7/10 at worse        Start:  06/26/25    Expected End:  07/26/25            be able to sleep on either side without awaking with pain        Start:  06/26/25    Expected End:  07/26/25            Plan of care Certification: 6/26/2025 to 7/26/2025 . Outpatient Physical Therapy 2 times weekly for 4 weeks to include the following interventions: Manual Therapy, Neuromuscular Re-ed, Patient Education, Therapeutic Activities, Therapeutic Exercise, and Ultrasound and dry needling .     Tess Marquez, PTA

## 2025-07-17 ENCOUNTER — CLINICAL SUPPORT (OUTPATIENT)
Dept: REHABILITATION | Facility: HOSPITAL | Age: 30
End: 2025-07-17
Payer: COMMERCIAL

## 2025-07-17 ENCOUNTER — PATIENT MESSAGE (OUTPATIENT)
Dept: ORTHOPEDICS | Facility: CLINIC | Age: 30
End: 2025-07-17
Payer: COMMERCIAL

## 2025-07-17 DIAGNOSIS — M25.651 DECREASED RANGE OF MOTION OF BOTH HIPS: Primary | ICD-10-CM

## 2025-07-17 DIAGNOSIS — M25.652 DECREASED RANGE OF MOTION OF BOTH HIPS: Primary | ICD-10-CM

## 2025-07-17 DIAGNOSIS — M70.62 GREATER TROCHANTERIC BURSITIS OF BOTH HIPS: ICD-10-CM

## 2025-07-17 DIAGNOSIS — M25.552 BILATERAL HIP PAIN: Primary | ICD-10-CM

## 2025-07-17 DIAGNOSIS — M25.551 BILATERAL HIP PAIN: Primary | ICD-10-CM

## 2025-07-17 DIAGNOSIS — M70.61 GREATER TROCHANTERIC BURSITIS OF BOTH HIPS: ICD-10-CM

## 2025-07-17 PROCEDURE — 97112 NEUROMUSCULAR REEDUCATION: CPT | Mod: PN,CQ

## 2025-07-17 PROCEDURE — 97035 APP MDLTY 1+ULTRASOUND EA 15: CPT | Mod: PN,CQ

## 2025-07-17 PROCEDURE — 97530 THERAPEUTIC ACTIVITIES: CPT | Mod: PN,CQ

## 2025-07-17 NOTE — PROGRESS NOTES
Outpatient Rehab    Physical Therapy Visit    Patient Name: Yeny Beaver  MRN: [id number]  YOB: 1995  Encounter Date: 7/17/2025    Therapy Diagnosis:   Encounter Diagnoses   Name Primary?    Decreased range of motion of both hips Yes    Greater trochanteric bursitis of both hips      Physician: Mikaela Odell FNP    Physician Orders: Eval and Treat  Medical Diagnosis: Greater trochanteric bursitis of both hips  Surgical Diagnosis: Not applicable for this Episode   Surgical Date: Not applicable for this Episode  Days Since Last Surgery: Not applicable for this Episode    Visit # / Visits Authorized:  4 / 7  Pta visit# 2  Insurance Authorization Period: 6/26/2025 to 7/26/2025  Date of Evaluation: 6/26/2025  Plan of Care Certification: 6/26/2025 to 7/26/2025     Time In:   845  Time Out: 930   Total Time (in minutes):     Total Billable Time (in minutes):  45    FOTO:  Intake Score: Not applicable for this Episode%  Survey Score 2: Not applicable for this Episode%  Survey Score 3: Not applicable for this Episode%    Precautions:         Subjective   I'm really not any better..  Pain reported as 7/10. 7/10 left  6/10 right    Objective       Hip Range of Motion   Right Hip   Active (deg) Passive (deg) Pain   Flexion 122       Extension         ABduction         ADduction         External Rotation 90/90         External Rotation Prone         Internal Rotation 90/90         Internal Rotation Prone             Left Hip   Active (deg) Passive (deg) Pain   Flexion 122       Extension         ABduction         ADduction         External Rotation 90/90         External Rotation Prone         Internal Rotation 90/90         Internal Rotation Prone                         Treatment:  Balance/Neuromuscular Re-Education  NMR 1: slant board 4 x 15 s/h  NMR 2: litzy hs stretch off step x 10  NMR 3: litzy ITB x 10  NMR 4: litzy sidelying hip ir blue tb x 10  NMR 5: litzy figure 4 piriformis stretch x 5  each  Therapeutic Activity  TA 1: biking x 5' to promote endurance with walking  TA 2: ds squats tg x 20 to simulate bending and squatting  TA 3: ds calf raises tg x 20 to simulate reaching on tip toes  TA 4: ds leg presses 30 x 75# to simulate bending and squatting  TA 5: litzy hip abd 10 x 20# to promote pelvic stability with walking  Modalities  Ultrasound: us to litzy GTB/ ITB 2.2 w/cm2 1 mhz x 5' ea side after being cleared of contraindications    Time Entry(in minutes):       Assessment & Plan   Assessment:         The patient will continue to benefit from skilled outpatient physical therapy in order to address the deficits listed in the problem list on the initial evaluation, provide patient and family education, and maximize the patients level of independence in the home and community environments.     The patient's spiritual, cultural, and educational needs were considered, and the patient is agreeable to the plan of care and goals.           Plan:      Goals:   Active       long term goals        increase all hip strength to 5/5        Start:  06/26/25    Expected End:  08/26/25            be able to return to walking for health pain free , greater than a mile        Start:  06/26/25    Expected End:  08/26/25            be pain free after sitting prolonged        Start:  06/26/25    Expected End:  08/26/25               short term goals        pt will be able to increase hip flexion to 130 degrees on right side        Start:  06/26/25    Expected End:  07/26/25            be independent with home ex program  (Met)       Start:  06/26/25    Expected End:  07/26/25    Resolved:  07/17/25         be able to decrease pain down from 10/10 to 7/10 at worse  (Met)       Start:  06/26/25    Expected End:  07/26/25    Resolved:  07/17/25         be able to sleep on either side without awaking with pain        Start:  06/26/25    Expected End:  07/26/25              Will d/c to home exercise program and follow up with  provider, see physical therapist d/c note  Tess Marquez, PTA

## 2025-07-17 NOTE — PROGRESS NOTES
Outpatient Rehab     Outpatient Therapy Discharge Summary     Name: Yeny Beaver  [facility] Number: [id number]    Therapy Diagnosis:   Encounter Diagnoses   Name Primary?    Decreased range of motion of both hips Yes    Greater trochanteric bursitis of both hips      Physician: Mikaela Odell FNP         Assessment    Goals:  Pt did not meet goals and requested d/c . Pt to follow up with ortho     Discharge reason: Patient has reached the maximum rehab potential for the present time    Plan   This patient is discharged from Physical Therapy.     Carmine Hung, PT  7/17/2025

## 2025-07-30 ENCOUNTER — TELEPHONE (OUTPATIENT)
Dept: ORTHOPEDICS | Facility: CLINIC | Age: 30
End: 2025-07-30
Payer: COMMERCIAL

## 2025-07-30 NOTE — TELEPHONE ENCOUNTER
Copied from CRM #[id number]. Topic: General Inquiry - Return Call  >> Jul 30, 2025  1:05 PM Zain wrote:  Who Called: Yeny Beaver    Patient is returning phone call    Who Left Message for Patient: Edwina   Does the patient know what this is regarding?: MRI results       Preferred Method of Contact: Phone Call  Patient's Preferred Phone Number on File: [phone]   Best Call Back Number, if different:  Additional Information:  tried calling the clinic the line was busy

## 2025-07-30 NOTE — TELEPHONE ENCOUNTER
----- Message from JOANA Christina sent at 7/30/2025  9:40 AM CDT -----  If she has not tried formal PT, can we set up formal PT and continue anti-inflammatories.  No tears identified.  Some concern for impingement.  We can have her return to clinic after therapy with Dr. Jane  ----- Message -----  From: Interface, Rad Results In  Sent: 7/29/2025   3:13 PM CDT  To: JOANA Chandra

## 2025-08-11 ENCOUNTER — OFFICE VISIT (OUTPATIENT)
Dept: ORTHOPEDICS | Facility: CLINIC | Age: 30
End: 2025-08-11
Payer: COMMERCIAL

## 2025-08-11 VITALS
SYSTOLIC BLOOD PRESSURE: 159 MMHG | WEIGHT: 263 LBS | BODY MASS INDEX: 39.86 KG/M2 | HEIGHT: 68 IN | DIASTOLIC BLOOD PRESSURE: 93 MMHG | HEART RATE: 82 BPM

## 2025-08-11 DIAGNOSIS — M25.851 FEMOROACETABULAR IMPINGEMENT OF RIGHT HIP: Primary | ICD-10-CM

## 2025-08-11 PROCEDURE — 99999 PR PBB SHADOW E&M-EST. PATIENT-LVL IV: CPT | Mod: PBBFAC,,, | Performed by: ORTHOPAEDIC SURGERY

## 2025-08-11 PROCEDURE — 3008F BODY MASS INDEX DOCD: CPT | Mod: ,,, | Performed by: ORTHOPAEDIC SURGERY

## 2025-08-11 PROCEDURE — 3077F SYST BP >= 140 MM HG: CPT | Mod: ,,, | Performed by: ORTHOPAEDIC SURGERY

## 2025-08-11 PROCEDURE — 3080F DIAST BP >= 90 MM HG: CPT | Mod: ,,, | Performed by: ORTHOPAEDIC SURGERY

## 2025-08-11 PROCEDURE — 99214 OFFICE O/P EST MOD 30 MIN: CPT | Mod: PBBFAC | Performed by: ORTHOPAEDIC SURGERY

## 2025-08-11 PROCEDURE — 1159F MED LIST DOCD IN RCRD: CPT | Mod: ,,, | Performed by: ORTHOPAEDIC SURGERY

## 2025-08-11 PROCEDURE — 99213 OFFICE O/P EST LOW 20 MIN: CPT | Mod: S$PBB,,, | Performed by: ORTHOPAEDIC SURGERY

## 2025-08-11 PROCEDURE — 3044F HG A1C LEVEL LT 7.0%: CPT | Mod: ,,, | Performed by: ORTHOPAEDIC SURGERY

## 2025-08-12 ENCOUNTER — OFFICE VISIT (OUTPATIENT)
Dept: NEUROLOGY | Facility: CLINIC | Age: 30
End: 2025-08-12
Payer: COMMERCIAL

## 2025-08-12 VITALS
HEIGHT: 68 IN | RESPIRATION RATE: 18 BRPM | OXYGEN SATURATION: 98 % | WEIGHT: 256 LBS | DIASTOLIC BLOOD PRESSURE: 80 MMHG | BODY MASS INDEX: 38.8 KG/M2 | HEART RATE: 94 BPM | SYSTOLIC BLOOD PRESSURE: 110 MMHG

## 2025-08-12 DIAGNOSIS — G43.111 INTRACTABLE MIGRAINE WITH AURA WITH STATUS MIGRAINOSUS: ICD-10-CM

## 2025-08-12 DIAGNOSIS — F33.0 MILD EPISODE OF RECURRENT MAJOR DEPRESSIVE DISORDER: ICD-10-CM

## 2025-08-12 DIAGNOSIS — F41.9 ANXIETY: Primary | ICD-10-CM

## 2025-08-12 PROCEDURE — 3074F SYST BP LT 130 MM HG: CPT | Mod: ,,, | Performed by: PSYCHIATRY & NEUROLOGY

## 2025-08-12 PROCEDURE — 1160F RVW MEDS BY RX/DR IN RCRD: CPT | Mod: ,,, | Performed by: PSYCHIATRY & NEUROLOGY

## 2025-08-12 PROCEDURE — 3008F BODY MASS INDEX DOCD: CPT | Mod: ,,, | Performed by: PSYCHIATRY & NEUROLOGY

## 2025-08-12 PROCEDURE — 3079F DIAST BP 80-89 MM HG: CPT | Mod: ,,, | Performed by: PSYCHIATRY & NEUROLOGY

## 2025-08-12 PROCEDURE — 99999 PR PBB SHADOW E&M-EST. PATIENT-LVL V: CPT | Mod: PBBFAC,,, | Performed by: PSYCHIATRY & NEUROLOGY

## 2025-08-12 PROCEDURE — 99215 OFFICE O/P EST HI 40 MIN: CPT | Mod: PBBFAC | Performed by: PSYCHIATRY & NEUROLOGY

## 2025-08-12 PROCEDURE — 3044F HG A1C LEVEL LT 7.0%: CPT | Mod: ,,, | Performed by: PSYCHIATRY & NEUROLOGY

## 2025-08-12 PROCEDURE — 1159F MED LIST DOCD IN RCRD: CPT | Mod: ,,, | Performed by: PSYCHIATRY & NEUROLOGY

## 2025-08-12 PROCEDURE — 99214 OFFICE O/P EST MOD 30 MIN: CPT | Mod: S$PBB,,, | Performed by: PSYCHIATRY & NEUROLOGY

## 2025-08-12 RX ORDER — FREMANEZUMAB-VFRM 225 MG/1.5ML
225 INJECTION SUBCUTANEOUS
Qty: 3 EACH | Refills: 3 | Status: SHIPPED | OUTPATIENT
Start: 2025-08-12

## 2025-08-13 ENCOUNTER — PATIENT MESSAGE (OUTPATIENT)
Dept: FAMILY MEDICINE | Facility: CLINIC | Age: 30
End: 2025-08-13
Payer: COMMERCIAL

## 2025-08-13 DIAGNOSIS — G43.111 INTRACTABLE MIGRAINE WITH AURA WITH STATUS MIGRAINOSUS: Primary | ICD-10-CM

## 2025-08-17 DIAGNOSIS — G43.111 INTRACTABLE MIGRAINE WITH AURA WITH STATUS MIGRAINOSUS: ICD-10-CM

## 2025-08-18 RX ORDER — PROMETHAZINE HYDROCHLORIDE 12.5 MG/1
12.5 TABLET ORAL 4 TIMES DAILY PRN
Qty: 30 TABLET | Refills: 1 | Status: SHIPPED | OUTPATIENT
Start: 2025-08-18

## 2025-08-21 ENCOUNTER — HOSPITAL ENCOUNTER (OUTPATIENT)
Dept: RADIOLOGY | Facility: HOSPITAL | Age: 30
Discharge: HOME | End: 2025-08-21
Attending: ORTHOPAEDIC SURGERY
Payer: COMMERCIAL

## 2025-08-21 DIAGNOSIS — M25.851 FEMOROACETABULAR IMPINGEMENT OF RIGHT HIP: ICD-10-CM

## 2025-08-21 PROCEDURE — 77002 NEEDLE LOCALIZATION BY XRAY: CPT | Mod: TC

## 2025-08-21 PROCEDURE — 63600175 PHARM REV CODE 636 W HCPCS: Performed by: RADIOLOGY

## 2025-08-21 PROCEDURE — 25500020 PHARM REV CODE 255: Performed by: RADIOLOGY

## 2025-08-21 RX ORDER — IOPAMIDOL 612 MG/ML
3 INJECTION, SOLUTION INTRAVASCULAR
Status: COMPLETED | OUTPATIENT
Start: 2025-08-21 | End: 2025-08-21

## 2025-08-21 RX ORDER — BUPIVACAINE HYDROCHLORIDE 5 MG/ML
4 INJECTION, SOLUTION EPIDURAL; INTRACAUDAL; PERINEURAL ONCE
Status: COMPLETED | OUTPATIENT
Start: 2025-08-21 | End: 2025-08-21

## 2025-08-21 RX ORDER — TRIAMCINOLONE ACETONIDE 40 MG/ML
40 INJECTION, SUSPENSION INTRA-ARTICULAR; INTRAMUSCULAR ONCE
Status: COMPLETED | OUTPATIENT
Start: 2025-08-21 | End: 2025-08-21

## 2025-08-21 RX ADMIN — BUPIVACAINE HYDROCHLORIDE 20 MG: 5 INJECTION, SOLUTION EPIDURAL; INTRACAUDAL; PERINEURAL at 11:08

## 2025-08-21 RX ADMIN — TRIAMCINOLONE ACETONIDE 40 MG: 40 INJECTION, SUSPENSION INTRA-ARTICULAR; INTRAMUSCULAR at 11:08

## 2025-08-21 RX ADMIN — IOPAMIDOL 3 ML: 612 INJECTION, SOLUTION INTRAVENOUS at 10:08

## (undated) DEVICE — DISSECTOR KITTNER ENDO BLUNT DISP

## (undated) DEVICE — SUTURE PDS II 0 VIOLET 27 CT2

## (undated) DEVICE — SCISSOR INSERT 3/4 IN DISP

## (undated) DEVICE — MONOPOLAR FOOT SWITCHING LAP CHORD

## (undated) DEVICE — GLOVE SURGICAL PROTEXIS PI CLASSIC SIZE 8.5

## (undated) DEVICE — TROCAR BLADELESS Z-THREAD 5MM X 100MM

## (undated) DEVICE — TROCAR SLEEVE Z-THREAD 5MM X 100MM

## (undated) DEVICE — GLOVE SURGICAL PROTEXIS PI SIZE 6.5

## (undated) DEVICE — GLOVE SURGICAL PROTEXIS PI BLUE SIZE 6.5

## (undated) DEVICE — ENDO POUCH

## (undated) DEVICE — TROCAR GELPORT BLUNT BALLOON SYSTEM 10/12X100MM LF

## (undated) DEVICE — SUTURE VICRYL 4-0 FS2 UNDYED 27 IN

## (undated) DEVICE — WARMER SCOPE DISP

## (undated) DEVICE — LAPARSCOPIC L-HOOK WIRE

## (undated) DEVICE — CLIP APPLIER LIGSMAX MULTI 5MM

## (undated) DEVICE — SUTURE AUTO STITCH ENDO CLOSE

## (undated) DEVICE — GOWN SURGICAL SMARTGOWN LEVEL 4 / EXTRA LARGE STERILE

## (undated) DEVICE — STRIP CLOSURE SKIN 1/2 X 4 IN

## (undated) DEVICE — CDS LAP CHOLE